# Patient Record
Sex: FEMALE | Race: WHITE | Employment: UNEMPLOYED | URBAN - METROPOLITAN AREA
[De-identification: names, ages, dates, MRNs, and addresses within clinical notes are randomized per-mention and may not be internally consistent; named-entity substitution may affect disease eponyms.]

---

## 2019-12-11 ENCOUNTER — DIABETIC EDUCATION (OUTPATIENT)
Dept: ENDOCRINOLOGY CLINIC | Facility: CLINIC | Age: 39
End: 2019-12-11
Payer: COMMERCIAL

## 2019-12-11 VITALS — WEIGHT: 158.19 LBS | HEIGHT: 60 IN | BODY MASS INDEX: 31.06 KG/M2

## 2019-12-11 DIAGNOSIS — E11.65 TYPE 2 DIABETES MELLITUS WITH HYPERGLYCEMIA, WITHOUT LONG-TERM CURRENT USE OF INSULIN (HCC): Primary | ICD-10-CM

## 2019-12-11 PROCEDURE — G0108 DIAB MANAGE TRN  PER INDIV: HCPCS | Performed by: DIETITIAN, REGISTERED

## 2019-12-12 ENCOUNTER — TELEPHONE (OUTPATIENT)
Dept: NEUROLOGY | Facility: CLINIC | Age: 39
End: 2019-12-12

## 2019-12-12 NOTE — TELEPHONE ENCOUNTER
Had received medical records   [address] office notes and Lab report  Placed on the red folder for upcoming appointment on 12/12/19.

## 2019-12-12 NOTE — TELEPHONE ENCOUNTER
Explain patient that she needed to call them so they can fax over medical records as we don't know their  protocols. And explained that she need to bring her CT and MRI CD with reports.     I don't know what had happened she stopped me and telling me that I

## 2019-12-13 ENCOUNTER — OFFICE VISIT (OUTPATIENT)
Dept: NEUROLOGY | Facility: CLINIC | Age: 39
End: 2019-12-13
Payer: COMMERCIAL

## 2019-12-13 ENCOUNTER — TELEPHONE (OUTPATIENT)
Dept: NEUROLOGY | Facility: CLINIC | Age: 39
End: 2019-12-13

## 2019-12-13 VITALS
RESPIRATION RATE: 16 BRPM | BODY MASS INDEX: 31.02 KG/M2 | WEIGHT: 158 LBS | HEIGHT: 60 IN | DIASTOLIC BLOOD PRESSURE: 78 MMHG | HEART RATE: 82 BPM | SYSTOLIC BLOOD PRESSURE: 120 MMHG

## 2019-12-13 DIAGNOSIS — R39.15 URINARY URGENCY: ICD-10-CM

## 2019-12-13 DIAGNOSIS — R32 URINARY INCONTINENCE, UNSPECIFIED TYPE: ICD-10-CM

## 2019-12-13 DIAGNOSIS — R29.818 LHERMITTE'S SIGN POSITIVE: ICD-10-CM

## 2019-12-13 DIAGNOSIS — H53.8 BLURRY VISION: ICD-10-CM

## 2019-12-13 DIAGNOSIS — R20.0 RIGHT SIDED NUMBNESS: Primary | ICD-10-CM

## 2019-12-13 PROCEDURE — 1111F DSCHRG MED/CURRENT MED MERGE: CPT | Performed by: OTHER

## 2019-12-13 PROCEDURE — 99204 OFFICE O/P NEW MOD 45 MIN: CPT | Performed by: OTHER

## 2019-12-13 RX ORDER — METFORMIN HYDROCHLORIDE EXTENDED-RELEASE TABLETS 500 MG/1
500 TABLET, FILM COATED, EXTENDED RELEASE ORAL 2 TIMES DAILY WITH MEALS
COMMUNITY
End: 2020-01-03

## 2019-12-13 NOTE — TELEPHONE ENCOUNTER
Pt scheduled for MRIs on Monday, 12/16 for brain, cervical, and thoracic MRIs. Dr. Driss Chavira requesting PA. Pt is scheduled for direct admit after the outpt MRIs are completed.

## 2019-12-13 NOTE — TELEPHONE ENCOUNTER
RN spoke to Loco Lovett in [facility] and the pt is scheduled for a outpatient MRI and 10:00. The patient will then be admitted via Dr. Ana Christian. RN will contact the pt to inform her of the appt Monday morning.       RN spoke to the pt and informed h

## 2019-12-13 NOTE — H&P
Daniella New Patient / Consult Visit    Jennifer Woods is a 45year old female. Referring MD: No ref.  provider found    Patient presents with:  Hospital F/U: C/O of numbnessin hand and feet and abnormal MRI incontinence. On workup in ED, 11/7/19, Blood glucose was 295, A1C was 10.2, U/A with glucosuria.        Patient has a primary care provider, Dr. Leonarda Jones facility and was recently started on medication for diabetes        Otherw cyanosis, peripheral pulses intact    Neck: Supple; full range of motion; no carotid bruits    Mental status:  Alert and oriented to time, place, person, and situation  Speech: fluent  Language: normal naming, repetition, and comprehension  Memory: normal 31    TSH; 1.14 (normal)  Cpeptide: 3.12 (normal)  ESR 10 (normal)      Imaging:     Independently reviewed; poor quality but a few periventricular frontal white matter hypodensities noted ; no mass, no hemorrhage  CT brain: per report: (11/7/19)    Carmen Calderón addition to CSF studies, as noted below, to complete the work-up for demyelinating disease versus other mimickers.   Although patient states that she had an MRI of the brain done, no reports or imaging were available at the time of visit despite obtaining o PANEL VASCULITIS W/REFLEX,         RHEUMATOID ARTHRITIS FACTOR, VITAMIN B12, CELL         COUNT, CSF, CYTOLOGY FLUIDS, GLUCOSE,         CEREBROSPINAL FLUID, IGG/ALBUMIN RATIO,CSF,         PROTEIN, TOTAL, CSF, OLIGOCLONAL BANDING,         MONOCLONAL PROTEIN physician.       Matt Jackson MD, Neurology  [facility]  Pager [phone]  12/13/2019

## 2019-12-16 ENCOUNTER — HOSPITAL ENCOUNTER (OUTPATIENT)
Dept: MRI IMAGING | Facility: HOSPITAL | Age: 39
Discharge: HOME | End: 2019-12-16
Attending: Other
Payer: COMMERCIAL

## 2019-12-16 ENCOUNTER — HOSPITAL ENCOUNTER (OUTPATIENT)
Facility: HOSPITAL | Age: 39
Setting detail: OBSERVATION
Discharge: HOME | End: 2019-12-17
Attending: Other | Admitting: Other
Payer: COMMERCIAL

## 2019-12-16 ENCOUNTER — TELEPHONE (OUTPATIENT)
Dept: NEUROLOGY | Facility: CLINIC | Age: 39
End: 2019-12-16

## 2019-12-16 DIAGNOSIS — R32 URINARY INCONTINENCE, UNSPECIFIED TYPE: ICD-10-CM

## 2019-12-16 DIAGNOSIS — R29.818 LHERMITTE'S SIGN POSITIVE: ICD-10-CM

## 2019-12-16 DIAGNOSIS — R39.15 URINARY URGENCY: ICD-10-CM

## 2019-12-16 DIAGNOSIS — R20.0 RIGHT SIDED NUMBNESS: Primary | ICD-10-CM

## 2019-12-16 DIAGNOSIS — H53.8 BLURRY VISION: ICD-10-CM

## 2019-12-16 DIAGNOSIS — R20.0 RIGHT SIDED NUMBNESS: ICD-10-CM

## 2019-12-16 DIAGNOSIS — Z01.818 PRE-OP TESTING: ICD-10-CM

## 2019-12-16 LAB — GLUCOSE BLD-MCNC: 139 MG/DL (ref 70–99)

## 2019-12-16 PROCEDURE — 99222 1ST HOSP IP/OBS MODERATE 55: CPT | Performed by: OTHER

## 2019-12-16 PROCEDURE — A9575 INJ GADOTERATE MEGLUMI 0.1ML: HCPCS | Performed by: OTHER

## 2019-12-16 PROCEDURE — 70553 MRI BRAIN STEM W/O & W/DYE: CPT | Performed by: OTHER

## 2019-12-16 PROCEDURE — 72156 MRI NECK SPINE W/O & W/DYE: CPT | Performed by: OTHER

## 2019-12-16 PROCEDURE — 72157 MRI CHEST SPINE W/O & W/DYE: CPT | Performed by: OTHER

## 2019-12-16 PROCEDURE — 72158 MRI LUMBAR SPINE W/O & W/DYE: CPT | Performed by: OTHER

## 2019-12-16 RX ORDER — LORAZEPAM 1 MG/1
1 TABLET ORAL EVERY 12 HOURS PRN
Status: DISCONTINUED | OUTPATIENT
Start: 2019-12-16 | End: 2019-12-17

## 2019-12-16 NOTE — CONSULTS
[facility]    Report of Consultation    José Miguel Sow Patient Status:  Inpatient    1980 MRN [id number]   [facility] 3NE-A Attending Manisha Puentes MD   Hosp Day # 0 [facility] [address] [facility]     Date of Admission:  2019  Date Pertinent positives and negatives are noted in HPI. Physical Exam:  Blood pressure 105/74, last menstrual period 12/01/2019. GENERAL:  Patient is a(n) 44year old female in no acute distress.   HEENT:  Normocephalic, atraumatic  LUNGS: Clear to ausc in the thoracic spine as above without significant spinal canal or neural foraminal stenosis at any level. 5. Mild degenerative changes in the lumbar spine are most pronounced at the L5-S1 level.   There is no significant spinal canal or neural foramina

## 2019-12-16 NOTE — TELEPHONE ENCOUNTER
Charge nurse at [facility] notified of the direct admission via Dr Kimberly Grigsby. Patient will come to Registration after done with outpatient MRI. Paula notified of the above. verbalized the understanding.

## 2019-12-16 NOTE — TELEPHONE ENCOUNTER
Cherry Holguin called from MRI and requested to have a [place] MRI.   RN spoke to Dr. Mary Jane Saleh and received the order from the MD.    RN ordered the test.

## 2019-12-17 ENCOUNTER — APPOINTMENT (OUTPATIENT)
Dept: GENERAL RADIOLOGY | Facility: HOSPITAL | Age: 39
End: 2019-12-17
Attending: Other
Payer: COMMERCIAL

## 2019-12-17 VITALS
DIASTOLIC BLOOD PRESSURE: 71 MMHG | TEMPERATURE: 98 F | HEART RATE: 82 BPM | RESPIRATION RATE: 12 BRPM | OXYGEN SATURATION: 99 % | SYSTOLIC BLOOD PRESSURE: 111 MMHG

## 2019-12-17 LAB
ALBUMIN SERPL-MCNC: 3.9 G/DL (ref 3.4–5)
ALBUMIN/GLOB SERPL: 1.3 {RATIO} (ref 1–2)
ALP LIVER SERPL-CCNC: 80 U/L (ref 37–98)
ALT SERPL-CCNC: 32 U/L (ref 13–56)
ANION GAP SERPL CALC-SCNC: 8 MMOL/L (ref 0–18)
AST SERPL-CCNC: 21 U/L (ref 15–37)
BASOPHILS # BLD AUTO: 0.04 X10(3) UL (ref 0–0.2)
BASOPHILS NFR BLD AUTO: 0.7 %
BILIRUB SERPL-MCNC: 0.8 MG/DL (ref 0.1–2)
BUN BLD-MCNC: 9 MG/DL (ref 7–18)
BUN/CREAT SERPL: 24.3 (ref 10–20)
CALCIUM BLD-MCNC: 9 MG/DL (ref 8.5–10.1)
CHLORIDE SERPL-SCNC: 106 MMOL/L (ref 98–112)
CHOLEST SMN-MCNC: 207 MG/DL (ref ?–200)
CLARITY CSF: CLEAR
CLARITY CSF: CLEAR
CO2 SERPL-SCNC: 26 MMOL/L (ref 21–32)
COLOR CSF: COLORLESS
COLOR CSF: COLORLESS
COUNT PERFORMED ON TUBE: 1
COUNT PERFORMED ON TUBE: 4
CREAT BLD-MCNC: 0.37 MG/DL (ref 0.55–1.02)
DEPRECATED RDW RBC AUTO: 37.3 FL (ref 35.1–46.3)
EOSINOPHIL # BLD AUTO: 0.1 X10(3) UL (ref 0–0.7)
EOSINOPHIL NFR BLD AUTO: 1.7 %
ERYTHROCYTE [DISTWIDTH] IN BLOOD BY AUTOMATED COUNT: 12.3 % (ref 11–15)
EST. AVERAGE GLUCOSE BLD GHB EST-MCNC: 169 MG/DL (ref 68–126)
GLOBULIN PLAS-MCNC: 3.1 G/DL (ref 2.8–4.4)
GLUCOSE BLD-MCNC: 108 MG/DL (ref 70–99)
GLUCOSE BLD-MCNC: 118 MG/DL (ref 70–99)
GLUCOSE CSF-MCNC: 67 MG/DL (ref 40–70)
HBA1C MFR BLD HPLC: 7.5 % (ref ?–5.7)
HCT VFR BLD AUTO: 43.3 % (ref 35–48)
HDLC SERPL-MCNC: 41 MG/DL (ref 40–59)
HGB BLD-MCNC: 15.1 G/DL (ref 12–16)
IMM GRANULOCYTES # BLD AUTO: 0.01 X10(3) UL (ref 0–1)
IMM GRANULOCYTES NFR BLD: 0.2 %
INR BLD: 1 (ref 0.9–1.1)
LDLC SERPL CALC-MCNC: 133 MG/DL (ref ?–100)
LYMPHOCYTES # BLD AUTO: 1.54 X10(3) UL (ref 1–4)
LYMPHOCYTES NFR BLD AUTO: 25.7 %
M PROTEIN MFR SERPL ELPH: 7 G/DL (ref 6.4–8.2)
MCH RBC QN AUTO: 29.8 PG (ref 26–34)
MCHC RBC AUTO-ENTMCNC: 34.9 G/DL (ref 31–37)
MCV RBC AUTO: 85.4 FL (ref 80–100)
MONOCYTES # BLD AUTO: 0.36 X10(3) UL (ref 0.1–1)
MONOCYTES NFR BLD AUTO: 6 %
NEUTROPHILS # BLD AUTO: 3.94 X10 (3) UL (ref 1.5–7.7)
NEUTROPHILS # BLD AUTO: 3.94 X10(3) UL (ref 1.5–7.7)
NEUTROPHILS NFR BLD AUTO: 65.7 %
NONHDLC SERPL-MCNC: 166 MG/DL (ref ?–130)
OSMOLALITY SERPL CALC.SUM OF ELEC: 289 MOSM/KG (ref 275–295)
PLATELET # BLD AUTO: 214 10(3)UL (ref 150–450)
POTASSIUM SERPL-SCNC: 4.2 MMOL/L (ref 3.5–5.1)
PROT PATTERN CSF ELPH-IMP: 35.9 MG/DL (ref 15–45)
PSA SERPL DL<=0.01 NG/ML-MCNC: 13.6 SECONDS (ref 12.5–14.7)
RBC # BLD AUTO: 5.07 X10(6)UL (ref 3.8–5.3)
RBC # CSF: 0 /MM3
RBC # CSF: 1 /MM3
SODIUM SERPL-SCNC: 140 MMOL/L (ref 136–145)
T4 FREE SERPL-MCNC: 0.9 NG/DL (ref 0.8–1.7)
TOTAL VOLUME CSF: 12 ML
TOTAL VOLUME CSF: 12 ML
TRIGL SERPL-MCNC: 165 MG/DL (ref 30–149)
TSI SER-ACNC: 3.48 MIU/ML (ref 0.36–3.74)
VLDLC SERPL CALC-MCNC: 33 MG/DL (ref 0–30)
WBC # BLD AUTO: 6 X10(3) UL (ref 4–11)
WBC # CSF: 1 /MM3 (ref 0–5)
WBC # CSF: 2 /MM3 (ref 0–5)

## 2019-12-17 PROCEDURE — 77003 FLUOROGUIDE FOR SPINE INJECT: CPT | Performed by: OTHER

## 2019-12-17 PROCEDURE — 99222 1ST HOSP IP/OBS MODERATE 55: CPT | Performed by: HOSPITALIST

## 2019-12-17 PROCEDURE — 99233 SBSQ HOSP IP/OBS HIGH 50: CPT | Performed by: OTHER

## 2019-12-17 PROCEDURE — 009U3ZX DRAINAGE OF SPINAL CANAL, PERCUTANEOUS APPROACH, DIAGNOSTIC: ICD-10-PCS | Performed by: RADIOLOGY

## 2019-12-17 PROCEDURE — 62270 DX LMBR SPI PNXR: CPT | Performed by: OTHER

## 2019-12-17 RX ORDER — NORTRIPTYLINE HYDROCHLORIDE 10 MG/1
10 CAPSULE ORAL NIGHTLY
Status: DISCONTINUED | OUTPATIENT
Start: 2019-12-17 | End: 2019-12-17

## 2019-12-17 RX ORDER — NORTRIPTYLINE HYDROCHLORIDE 10 MG/1
20 CAPSULE ORAL NIGHTLY
Status: DISCONTINUED | OUTPATIENT
Start: 2019-12-20 | End: 2019-12-17

## 2019-12-17 RX ORDER — ACETAMINOPHEN 325 MG/1
TABLET ORAL
Status: DISCONTINUED
Start: 2019-12-17 | End: 2019-12-17

## 2019-12-17 RX ORDER — ACETAMINOPHEN 325 MG/1
650 TABLET ORAL EVERY 6 HOURS PRN
Status: DISCONTINUED | OUTPATIENT
Start: 2019-12-17 | End: 2019-12-17

## 2019-12-17 RX ORDER — METHYLPREDNISOLONE 4 MG/1
TABLET ORAL
Qty: 21 TABLET | Refills: 0 | Status: SHIPPED | OUTPATIENT
Start: 2019-12-17 | End: 2020-01-03 | Stop reason: ALTCHOICE

## 2019-12-17 RX ORDER — NORTRIPTYLINE HYDROCHLORIDE 10 MG/1
CAPSULE ORAL
Qty: 63 CAPSULE | Refills: 1 | Status: SHIPPED | OUTPATIENT
Start: 2019-12-17 | End: 2020-01-03

## 2019-12-17 NOTE — PLAN OF CARE
A&Ox4. Patient admits to right arm and right leg weakness, numbness and tingling. States that her right eye feels like it \"has a film over it\". C/o headache- declined pharmacological intervention, ice packs given.  Remains on room air, lungs clear and dim volume within ordered parameters to optimize cerebral perfusion and minimize risk of hemorrhage  - Monitor temperature, glucose, and sodium.  Initiate appropriate interventions as ordered  12/17/2019 0008 by Prisca Dominguez RN  Outcome: Progressing  12/17/2019

## 2019-12-17 NOTE — PLAN OF CARE
Patient to be discharged home this afternoon.      Problem: Diabetes/Glucose Control  Goal: Glucose maintained within prescribed range  Description  INTERVENTIONS:  - Monitor Blood Glucose as ordered  - Assess for signs and symptoms of hyperglycemia and hyp

## 2019-12-17 NOTE — PROGRESS NOTES
PROCEDURE: XR LUMBAR PUNCTURE  (CPT=62270,66947)    COMPARISON: [facility] , MR, MRI CERVICAL+THORACIC+LUMBAR SPINE (ALL W+WO) (CPT=72156/44092/7, 12/16/2019, 9:33.     INDICATIONS: evaluate for demyelinating disease    TECHNIQUE: The risks, benefits, and alter

## 2019-12-17 NOTE — PLAN OF CARE
Patient to be discharged home this afternoon.     Problem: Diabetes/Glucose Control  Goal: Glucose maintained within prescribed range  Description  INTERVENTIONS:  - Monitor Blood Glucose as ordered  - Assess for signs and symptoms of hyperglycemia and hypo Mandy Castañeda RN  Outcome: Adequate for Discharge

## 2019-12-17 NOTE — PROGRESS NOTES
[address] [facility] Neu[facility] Progress Note    Zuri Renee Patient Status:  Inpatient    1980 MRN [id number]   [facility] 3NE-A Attending Jarett Ricardo MD   Hosp Day # 1 [facility]         Subjective:  Zuri Renee is abnormality identified.      2. Small arachnoid cyst along the paramedian left cerebellar hemisphere measuring 1.9 x 1.9 cm.     3. Scattered punctate and patchy areas of nonspecific T2/FLAIR hyperintensity noted in the cerebral white matter.  Possible etio Addendum:  I have seen the patient independently, reviewed the history, labs and imaging, and agree with above note with following additions:  S:Reports had headache last night but did not take the Depakote  O: BP 99/63 (BP Location: Left arm)   Pulse 87 start low dose statin?     LP today- routine studies and oligoclonal bands    Migraine headaches, daily, with medication overuse component- recommend initiation of nortriptyline 20mg nightly, discussed cessation of ibuprofen and replace temporarily with Me

## 2019-12-17 NOTE — CONSULTS
[facility],  [address] Patient Status:  Inpatient    1980 MRN [id number]   [facility] [place] 3NE-A Attending Leeanna Jung MD   Hosp Day # 1 PCP [address]     Reason for consult: Diabetes mellitus    Requeste comprehensive 14 point review of systems was completed. Pertinent positives and negatives noted in the HPI.     Physical Exam:    /71 (BP Location: Left arm)   Pulse 82   Temp 97.5 °F (36.4 °C) (Oral)   Resp 12   LMP 12/01/2019   SpO2 99%   General

## 2019-12-17 NOTE — TELEPHONE ENCOUNTER
Anika 9  [facility]  Medical Records reviewed by Dr. Pamela Zepeda.   Copy to scanning

## 2019-12-17 NOTE — TELEPHONE ENCOUNTER
PA department was not notified of added test on 12/16/19 to start PA on 12/16/19. MRI without contrast [id number] was in [place] campus on 12/17/19. I closed referral because with and without was done on 12/16/19.      I called [facility] today and spoke with [facility] to see i

## 2019-12-18 LAB
ALBUMIN, CSF: 16 MG/DL
CSF IGG/ALBUMIN RATIO: 0.08 RATIO
IMMUNOGLOBULIN G CSF: 1.3 MG/DL

## 2019-12-18 NOTE — PAYOR COMM NOTE
--------------  ADMISSION REVIEW     Payor: Harriet Hays [facility] #:  [id number]  Authorization Number: [id number]    Admit date: 12/16/19  Admit time: 5       Admitting Physician: Nichole Vargas MD  Attending Physician:  Roxie drugs.     Allergies:     Penicillins             HIVES     Medications:     Current Facility-Administered Medications:   •  Valproate Sodium (DEPACON) 500 mg in sodium chloride 0.9% 100 mL IVPB, 500 mg, Intravenous, Q12H PRN  •  LORazepam (ATIVAN) tab 1 m 12/16/19  CONCLUSION:       1. Mild degenerative changes in the cervical spine as above along with developmental narrowing of the cervical spinal canal contributes to spinal canal stenosis at the C4-5 level.       2.  There is mild spinal canal stenosis at Ritu Alonzo is a(n) 44year old female with history of newly diagnosed DM, who was admitted for work up for demyelinating disease, for multiple symptoms including blurry vision in the right eye, loss of mental clarity, tingling in her extremities, feeling warm cerebral white matter.  Possible etiologies for these findings would include migraine headaches, postinfectious/postinflammatory changes, nonspecific gliosis, and   accelerated microvascular ischemic disease amongst various other etiologies.  Clinical corre Location: Left arm)   Pulse 87   Temp 98.1 °F (36.7 °C) (Oral)   Resp 16   LMP 12/01/2019   Neuro exam pertinent for unchanged from yesterday   Imaging:   MRI cervical/thoracic/lumbar  CONCLUSION:       1.  Mild degenerative changes in the cervical spine as ibuprofen and replace temporarily with Medrol dose pack for headache control, followed by prn ibuprofen, but NOT more than 2-3 times per week.        Shirlene Santoro DO  Neuromuscular and General Neurology      [facility] HOSPITALIST  CONSULT            Paula POLO encounter. metFORMIN HCl ER, OSM, 500 MG (OSM) Oral Tablet 24 Hr, Take 500 mg by mouth 2 (two) times daily with meals. , Disp: , Rfl:   SITagliptin Phosphate 25 MG Oral Tab, Take 25 mg by mouth daily. , Disp: , Rfl:            Review of Systems:   A compre

## 2019-12-19 LAB
ALBUMIN INDEX: 4.8 RATIO
ALBUMIN, CSF: 21 MG/DL
ALBUMIN, SERUM/PLASMA, NEPH: 4390 MG/DL
CSF BAND OLIGOCLONAL: NEGATIVE
CSF IGG SYNTHESIS RATE: <0 MG/D
CSF IGG/ALBUMIN RATIO: 0.08 RATIO
CSF OLIGOCLONAL BANDS NUMBER: 0 BANDS
IGG INDEX: 0.46 RATIO
IMMUNOGLOBULIN G CSF: 1.6 MG/DL
IMMUNOGLOBULIN G: 724 MG/DL
NON GYNE INTERPRETATION: NEGATIVE
T.PALLIDUM (VDRL) CSF REFLEX: NON REACTIVE

## 2019-12-19 NOTE — PAYOR COMM NOTE
--------------  DISCHARGE REVIEW    Payor: Harriet Hays [address] #:  [id number]  Authorization Number: [id number]    Admit date: 12/16/19  Admit time:  2210  Discharge Date: 12/17/2019  5:07 PM     Admitting Physician: Abraham Noyola

## 2019-12-20 LAB
HSV 1 SUBTYPE BY PCR: NOT DETECTED
HSV 2 SUBTYPE BY PCR: NOT DETECTED

## 2019-12-23 PROBLEM — Z01.818 PRE-OP TESTING: Status: ACTIVE | Noted: 2019-12-23

## 2020-01-02 RX ORDER — METFORMIN HYDROCHLORIDE 500 MG/1
TABLET, EXTENDED RELEASE ORAL
Refills: 1 | COMMUNITY
Start: 2019-12-12

## 2020-01-03 ENCOUNTER — OFFICE VISIT (OUTPATIENT)
Dept: NEUROLOGY | Facility: CLINIC | Age: 40
End: 2020-01-03
Payer: COMMERCIAL

## 2020-01-03 VITALS
HEIGHT: 60 IN | RESPIRATION RATE: 14 BRPM | SYSTOLIC BLOOD PRESSURE: 124 MMHG | BODY MASS INDEX: 29.64 KG/M2 | WEIGHT: 151 LBS | HEART RATE: 110 BPM | DIASTOLIC BLOOD PRESSURE: 84 MMHG | OXYGEN SATURATION: 98 %

## 2020-01-03 DIAGNOSIS — R20.2 PARESTHESIAS: ICD-10-CM

## 2020-01-03 DIAGNOSIS — R32 URINARY INCONTINENCE, UNSPECIFIED TYPE: ICD-10-CM

## 2020-01-03 DIAGNOSIS — R20.0 RIGHT SIDED NUMBNESS: Primary | ICD-10-CM

## 2020-01-03 PROCEDURE — 99214 OFFICE O/P EST MOD 30 MIN: CPT | Performed by: OTHER

## 2020-01-03 RX ORDER — NORTRIPTYLINE HYDROCHLORIDE 25 MG/1
25 CAPSULE ORAL NIGHTLY
Qty: 30 CAPSULE | Refills: 2 | Status: SHIPPED | OUTPATIENT
Start: 2020-01-03 | End: 2020-04-02

## 2020-01-03 NOTE — PROGRESS NOTES
[facility] Progress Note    HPI  Patient presents with:  Numbness: right side 3 week f/u-still having numbness, feels worse      As per my initial H&P from 12/13/19:,   \" Sadipopeye Calix is a 45year old, who presents for evaluation of 10.2, U/A with glucosuria.        Patient has a primary care provider, Dr. Toby Rodrigez [facility] and was recently started on medication for diabetes        Otherwise, patient denies any recent weight change, fevers, chills, nausea,  chest SITagliptin Phosphate 25 MG Oral Tab, Take 25 mg by mouth daily. , Disp: , Rfl:     Review of Systems:  No chest pain or palpitations; otherwise as noted in HPI.     Exam:  /84 (BP Location: Left arm, Patient Position: Sitting, Cuff Size: adult)   Puls (3) uL    Neutrophils Absolute      1.50 - 7.70 x10(3) uL    Lymphocytes Absolute      1.00 - 4.00 x10(3) uL    Monocytes Absolute      0.10 - 1.00 x10(3) uL    Eosinophils Absolute      0.00 - 0.70 x10(3) uL    Basophils Absolute      0.00 - 0.20 x10(3) u DIAGNOSIS          Gross Description          CLINICAL INFORMATION          Non Gyne Interpretation           CSF BAND OLIGOCLONAL          CSF OLIGOCLONAL BANDS NUMBER      0 - 1 Bands    CSF OLIG INTERPRETATION          PT      12.5 - 14.7 seconds    INR AST (SGOT)      15 - 37 U/L    ALT (SGPT)      13 - 56 U/L    ALKALINE PHOSPHATASE      37 - 98 U/L    Total Bilirubin      0.1 - 2.0 mg/dL    TOTAL PROTEIN      6.4 - 8.2 g/dL    Albumin      3.4 - 5.0 g/dL    Globulin      2.8 - 4.4 g/dL    A/G Ratio 12/17/2019 12/17/2019 12/17/2019          10:47 AM 10:45 AM 10:37 AM   WBC      4.0 - 11.0 x10(3) uL 6.0     RBC      3.80 - 5.30 x10(6)uL 5.07     Hemoglobin      12.0 - 16.0 g/dL 15.1     Hematocrit      35.0 - 48.0 % 43.3     Platelet Count      180.5 - 1. 6 1.3   ALBUMIN, SERUM/PLASMA, NEPH      3500 - 5200 mg/dL  4390    ALBUMIN INDEX      0.0 - 9.0 ratio  4.8    CSF IGG SYNTHESIS RATE      <=8.0 mg/d  <0.0    CSF IGG/ALBUMIN RATIO      0.09 - 0.25 ratio  0.08 (L) 0.08 (L)   IGG INDEX      0.28 - 0.66 ra Absolute      0.10 - 1.00 x10(3) uL     Eosinophils Absolute      0.00 - 0.70 x10(3) uL     Basophils Absolute      0.00 - 0.20 x10(3) uL     Immature Granulocyte Absolute      0.00 - 1.00 x10(3) uL     Neutrophils %      %     Lymphocytes %      %     Mon Report           FINAL DIAGNOSIS           Gross Description           CLINICAL INFORMATION           Non Gyne Interpretation            CSF BAND OLIGOCLONAL           CSF OLIGOCLONAL BANDS NUMBER      0 - 1 Bands     CSF OLIG INTERPRETATION           PT parenchymal gradient susceptibility. There is no abnormal parenchymal or leptomeningeal enhancement. There is no restricted diffusion to suggest acute ischemia/infarction.      Small right maxillary retention cyst.  Trace ethmoid and maxillary mucosal thi foraminal stenosis. C3-4:  Minimal central disc protrusion. There is no significant spinal canal or neural foraminal stenosis.      C4-5: There is a minimal posterior disk osteophyte complex with minimal uncovertebral and facet joint degenerative change level.  The roots of the cauda equina are unremarkable. No focal mass or fluid collection is seen in the lumbar spinal   canal.  The paraspinal soft tissues are unremarkable. T12-L1 and L1-2: There is no significant abnormality.      L2-3 through L4-5: history of constipation, with new onset of confusion, vision changes, and right-sided numbness suggestive of demyelinating disease, but MRI of the brain demonstrates nonspecific T2/flair periventricular white matter changes, with no evidence for elevation

## 2020-04-02 DIAGNOSIS — R20.2 PARESTHESIAS: ICD-10-CM

## 2020-04-02 DIAGNOSIS — R20.0 RIGHT SIDED NUMBNESS: ICD-10-CM

## 2020-04-02 RX ORDER — NORTRIPTYLINE HYDROCHLORIDE 25 MG/1
25 CAPSULE ORAL NIGHTLY
Qty: 30 CAPSULE | Refills: 2 | Status: SHIPPED | OUTPATIENT
Start: 2020-04-02 | End: 2020-06-24

## 2020-04-02 NOTE — TELEPHONE ENCOUNTER
Medication: Nortripyline    Date of last refill: 1/3/2020 (#30/2)  [place]    Last office visit: 1/3/2020  Due back to clinic per last office note:  Not specified in note  Date next office visit sc

## 2020-06-24 DIAGNOSIS — R20.2 PARESTHESIAS: ICD-10-CM

## 2020-06-24 DIAGNOSIS — R20.0 RIGHT SIDED NUMBNESS: ICD-10-CM

## 2020-06-24 RX ORDER — NORTRIPTYLINE HYDROCHLORIDE 25 MG/1
CAPSULE ORAL
Qty: 90 CAPSULE | Refills: 0 | Status: SHIPPED | OUTPATIENT
Start: 2020-06-24

## 2020-06-24 NOTE — TELEPHONE ENCOUNTER
Medication: Nortriptyline HCl 25 MG Oral Cap    Date of last refill: 4/2/2020 (#30/2)  [place] 0/89/0814    Last office visit: 1/3/2020  Due back to clinic per last office note:  N/A  Date next office visit scheduled:  [address])        As noted above      (R20.2) Paresthesias  Plan: Nortriptyline HCl 25 MG Oral Cap, EMG ([address] [place] [address])        As noted above      No follow-ups on file.

## 2023-08-22 ENCOUNTER — OFFICE VISIT (OUTPATIENT)
Facility: LOCATION | Age: 43
End: 2023-08-22
Payer: COMMERCIAL

## 2023-08-22 DIAGNOSIS — E04.2 MULTIPLE THYROID NODULES: Primary | ICD-10-CM

## 2023-08-22 DIAGNOSIS — R49.0 HOARSENESS: ICD-10-CM

## 2023-08-22 DIAGNOSIS — R13.13 PHARYNGEAL DYSPHAGIA: ICD-10-CM

## 2023-08-22 PROCEDURE — 31575 DIAGNOSTIC LARYNGOSCOPY: CPT | Performed by: OTOLARYNGOLOGY

## 2023-08-22 PROCEDURE — 99204 OFFICE O/P NEW MOD 45 MIN: CPT | Performed by: OTOLARYNGOLOGY

## 2023-08-22 RX ORDER — DIAZEPAM 5 MG/1
5 TABLET ORAL EVERY 6 HOURS PRN
Qty: 2 TABLET | Refills: 0 | Status: SHIPPED
Start: 2023-08-22 | End: 2023-08-22

## 2023-08-22 RX ORDER — DIAZEPAM 5 MG/1
5 TABLET ORAL EVERY 6 HOURS PRN
Qty: 2 TABLET | Refills: 0 | Status: SHIPPED | OUTPATIENT
Start: 2023-08-22

## 2023-08-22 NOTE — PROGRESS NOTES
Myranda Patel is a 43year old female. Patient presents with:  Thyroid Problem    HPI:   She has history of thyroid nodules. She has noticed some fullness in her throat. At times it is difficult to swallow. She will also get some hoarseness. Current Outpatient Medications   Medication Sig Dispense Refill    diazePAM (VALIUM) 5 MG Oral Tab Take 1 tablet (5 mg total) by mouth every 6 (six) hours as needed for Anxiety. Take one tab one hour before procedure. May repeat one time. 2 tablet 0    NORTRIPTYLINE HCL 25 MG Oral Cap TAKE 1 CAPSULE BY MOUTH EVERY DAY IN THE EVENING 90 capsule 0    PROCTOZONE-HC 2.5 % Rectal Cream APPLY CREAM RECTALLY TO AFFECTED AREA TWICE DAILY FOR 10 DAYS      metFORMIN HCl  MG Oral Tablet 24 Hr TAKE 1 TABLET BY MOUTH TWICE A DAY WITH FOOD  1    SITagliptin Phosphate 25 MG Oral Tab Take 25 mg by mouth daily. Past Medical History:   Diagnosis Date    Diabetes ([facility] [address]       Social History:  Social History     Socioeconomic History    Marital status:    Tobacco Use    Smoking status: Never    Smokeless tobacco: Never   Vaping Use    Vaping Use: Never used   Substance and Sexual Activity    Alcohol use: Not Currently    Drug use: Never   Other Topics Concern    Caffeine Concern Yes     Comment: 1-2 cups a day    Special Diet Yes     Comment: 2-3 x week      Past Surgical History:   Procedure Laterality Date      2002      06/15/2005      2007      10/17/2013         REVIEW OF SYSTEMS:   GENERAL HEALTH: feels well otherwise  GENERAL : denies fever, chills, sweats, weight loss, weight gain  SKIN: denies any unusual skin lesions or rashes  RESPIRATORY: denies shortness of breath with exertion  NEURO: denies headaches    EXAM:   There were no vitals taken for this visit. System Findings Details   Constitutional  Overall appearance - Normal.   Psychiatric  Orientation - Oriented to time, place, person & situation.  Appropriate mood and affect. Head/Face  Facial features -- Normal. Skull - Normal.   Eyes  Pupils equal ,round ,react to light and accomidate   Ears, Nose, Throat, Neck  Ears clear nose clear pharynx clear neck no masses   Neurological  Memory - Normal. Cranial nerves - Cranial nerves II through XII grossly intact. Lymph Detail  Submental. Submandibular. Anterior cervical. Posterior cervical. Supraclavicular. Flexible Laryngoscopy Procedure Note (82538)    Due to inability for adequate examination of the larynx and need for magnification to perform the examination, endoscopy was performed. Risks and benefits were discussed with patient/family and they have given verbal consent to proceed. Pre-operative Diagnosis: Multiple thyroid nodules  (primary encounter diagnosis)  Pharyngeal dysphagia  Hoarseness    Post-operative Diagnosis: Same    Procedure: Diagnostic flexible fiberoptic laryngoscopy    Anesthesia: topical lidocaine    Surgeon: Marcie Avila MD    EBL: 0cc    Procedure Detail & Findings: Base tongue epiglottis and true vocal cords are normal with normal mobility    Condition: Stable    Complications: Patient tolerated the procedure well with no immediate complications. Chemo Rodríguez MD    ASSESSMENT AND PLAN:   1. Multiple thyroid nodules  Ultrasound reviewed. This shows multiple nodules. The ultrasound is from [address] [place] [address]. She shows a 1.6 cm nodule which needs biopsy. She also has a family history of thyroid cancer in sister.  - US FNA THYROID, GUIDE INCLD, FIRST LESION (CPT=10005); Future    2. Pharyngeal dysphagia  Examination today including flexible laryngoscopy is otherwise normal.    3. Hoarseness        The patient indicates understanding of these issues and agrees to the plan. No follow-ups on file.     Marcie Avila MD  8/22/2023  6:55 PM

## 2023-09-11 ENCOUNTER — HOSPITAL ENCOUNTER (OUTPATIENT)
Dept: ULTRASOUND IMAGING | Facility: HOSPITAL | Age: 43
Discharge: HOME | End: 2023-09-11
Attending: OTOLARYNGOLOGY
Payer: COMMERCIAL

## 2023-09-11 DIAGNOSIS — E04.2 MULTIPLE THYROID NODULES: ICD-10-CM

## 2023-09-11 PROCEDURE — 88173 CYTOPATH EVAL FNA REPORT: CPT | Performed by: OTOLARYNGOLOGY

## 2023-09-11 PROCEDURE — 10005 FNA BX W/US GDN 1ST LES: CPT | Performed by: OTOLARYNGOLOGY

## 2023-09-13 ENCOUNTER — TELEPHONE (OUTPATIENT)
Facility: LOCATION | Age: 43
End: 2023-09-13

## 2023-09-15 ENCOUNTER — OFFICE VISIT (OUTPATIENT)
Facility: LOCATION | Age: 43
End: 2023-09-15
Payer: COMMERCIAL

## 2023-09-15 DIAGNOSIS — C73 PAPILLARY THYROID CARCINOMA (HCC): Primary | ICD-10-CM

## 2023-09-20 ENCOUNTER — TELEPHONE (OUTPATIENT)
Facility: LOCATION | Age: 43
End: 2023-09-20

## 2023-09-20 DIAGNOSIS — C73 PAPILLARY CARCINOMA OF THYROID (HCC): Primary | ICD-10-CM

## 2023-09-20 DIAGNOSIS — E04.2 NONTOXIC MULTINODULAR GOITER: Primary | ICD-10-CM

## 2023-10-03 RX ORDER — ORAL SEMAGLUTIDE 3 MG/1
1 TABLET ORAL DAILY
Status: ON HOLD | COMMUNITY
End: 2023-10-29

## 2023-10-03 RX ORDER — LOSARTAN POTASSIUM 25 MG/1
25 TABLET ORAL NIGHTLY
COMMUNITY

## 2023-10-03 RX ORDER — EZETIMIBE 10 MG/1
10 TABLET ORAL NIGHTLY
COMMUNITY

## 2023-10-05 ENCOUNTER — EKG ENCOUNTER (OUTPATIENT)
Dept: LAB | Facility: HOSPITAL | Age: 43
End: 2023-10-05
Attending: OTOLARYNGOLOGY
Payer: COMMERCIAL

## 2023-10-05 DIAGNOSIS — E04.2 NONTOXIC MULTINODULAR GOITER: ICD-10-CM

## 2023-10-05 LAB
ALBUMIN SERPL-MCNC: 3.6 G/DL (ref 3.4–5)
ALBUMIN/GLOB SERPL: 1.1 {RATIO} (ref 1–2)
ALP LIVER SERPL-CCNC: 82 U/L
ALT SERPL-CCNC: 155 U/L
ANION GAP SERPL CALC-SCNC: 5 MMOL/L (ref 0–18)
AST SERPL-CCNC: 82 U/L (ref 15–37)
ATRIAL RATE: 77 BPM
BILIRUB SERPL-MCNC: 0.7 MG/DL (ref 0.1–2)
BUN BLD-MCNC: 7 MG/DL (ref 7–18)
CALCIUM BLD-MCNC: 8.7 MG/DL (ref 8.5–10.1)
CHLORIDE SERPL-SCNC: 105 MMOL/L (ref 98–112)
CO2 SERPL-SCNC: 27 MMOL/L (ref 21–32)
CREAT BLD-MCNC: 0.45 MG/DL
EGFRCR SERPLBLD CKD-EPI 2021: 123 ML/MIN/1.73M2 (ref 60–?)
FASTING STATUS PATIENT QL REPORTED: NO
GLOBULIN PLAS-MCNC: 3.4 G/DL (ref 2.8–4.4)
GLUCOSE BLD-MCNC: 175 MG/DL (ref 70–99)
OSMOLALITY SERPL CALC.SUM OF ELEC: 286 MOSM/KG (ref 275–295)
P AXIS: 24 DEGREES
P-R INTERVAL: 146 MS
POTASSIUM SERPL-SCNC: 3.5 MMOL/L (ref 3.5–5.1)
PROT SERPL-MCNC: 7 G/DL (ref 6.4–8.2)
Q-T INTERVAL: 400 MS
QRS DURATION: 66 MS
QTC CALCULATION (BEZET): 452 MS
R AXIS: 35 DEGREES
SODIUM SERPL-SCNC: 137 MMOL/L (ref 136–145)
T AXIS: 34 DEGREES
VENTRICULAR RATE: 77 BPM

## 2023-10-05 PROCEDURE — 93005 ELECTROCARDIOGRAM TRACING: CPT

## 2023-10-05 PROCEDURE — 93010 ELECTROCARDIOGRAM REPORT: CPT | Performed by: INTERNAL MEDICINE

## 2023-10-05 PROCEDURE — 36415 COLL VENOUS BLD VENIPUNCTURE: CPT | Performed by: OTOLARYNGOLOGY

## 2023-10-05 PROCEDURE — 80053 COMPREHEN METABOLIC PANEL: CPT | Performed by: OTOLARYNGOLOGY

## 2023-10-13 ENCOUNTER — ANESTHESIA EVENT (OUTPATIENT)
Dept: SURGERY | Facility: HOSPITAL | Age: 43
End: 2023-10-13
Payer: COMMERCIAL

## 2023-10-26 ENCOUNTER — HOSPITAL ENCOUNTER (OUTPATIENT)
Facility: HOSPITAL | Age: 43
Discharge: HOME | End: 2023-10-27
Attending: OTOLARYNGOLOGY | Admitting: OTOLARYNGOLOGY
Payer: COMMERCIAL

## 2023-10-26 ENCOUNTER — ANESTHESIA (OUTPATIENT)
Dept: SURGERY | Facility: HOSPITAL | Age: 43
End: 2023-10-26
Payer: COMMERCIAL

## 2023-10-26 DIAGNOSIS — E04.2 NONTOXIC MULTINODULAR GOITER: ICD-10-CM

## 2023-10-26 LAB
B-HCG UR QL: NEGATIVE
CALCIUM BLD-MCNC: 8.9 MG/DL (ref 8.5–10.1)
GLUCOSE BLD-MCNC: 224 MG/DL (ref 70–99)
GLUCOSE BLD-MCNC: 230 MG/DL (ref 70–99)
GLUCOSE BLD-MCNC: 233 MG/DL (ref 70–99)
GLUCOSE BLD-MCNC: 258 MG/DL (ref 70–99)
GLUCOSE BLD-MCNC: 263 MG/DL (ref 70–99)

## 2023-10-26 PROCEDURE — 07B10ZX EXCISION OF RIGHT NECK LYMPHATIC, OPEN APPROACH, DIAGNOSTIC: ICD-10-PCS | Performed by: OTOLARYNGOLOGY

## 2023-10-26 PROCEDURE — 99204 OFFICE O/P NEW MOD 45 MIN: CPT | Performed by: HOSPITALIST

## 2023-10-26 PROCEDURE — 0GTH0ZZ RESECTION OF RIGHT THYROID GLAND LOBE, OPEN APPROACH: ICD-10-PCS | Performed by: OTOLARYNGOLOGY

## 2023-10-26 PROCEDURE — 60240 REMOVAL OF THYROID: CPT | Performed by: OTOLARYNGOLOGY

## 2023-10-26 RX ORDER — HYDROMORPHONE HYDROCHLORIDE 1 MG/ML
0.4 INJECTION, SOLUTION INTRAMUSCULAR; INTRAVENOUS; SUBCUTANEOUS EVERY 2 HOUR PRN
Status: DISCONTINUED | OUTPATIENT
Start: 2023-10-26 | End: 2023-10-27

## 2023-10-26 RX ORDER — DIPHENHYDRAMINE HYDROCHLORIDE 50 MG/ML
12.5 INJECTION INTRAMUSCULAR; INTRAVENOUS AS NEEDED
Status: DISCONTINUED | OUTPATIENT
Start: 2023-10-26 | End: 2023-10-26 | Stop reason: HOSPADM

## 2023-10-26 RX ORDER — ONDANSETRON 2 MG/ML
4 INJECTION INTRAMUSCULAR; INTRAVENOUS EVERY 6 HOURS PRN
Status: DISCONTINUED | OUTPATIENT
Start: 2023-10-26 | End: 2023-10-26 | Stop reason: SDUPTHER

## 2023-10-26 RX ORDER — OXYCODONE HYDROCHLORIDE 5 MG/1
5 TABLET ORAL EVERY 4 HOURS PRN
Status: DISCONTINUED | OUTPATIENT
Start: 2023-10-26 | End: 2023-10-27

## 2023-10-26 RX ORDER — MEPERIDINE HYDROCHLORIDE 25 MG/ML
25 INJECTION INTRAMUSCULAR; INTRAVENOUS; SUBCUTANEOUS AS NEEDED
Status: DISCONTINUED | OUTPATIENT
Start: 2023-10-26 | End: 2023-10-26 | Stop reason: HOSPADM

## 2023-10-26 RX ORDER — DEXTROSE MONOHYDRATE 25 G/50ML
50 INJECTION, SOLUTION INTRAVENOUS
Status: DISCONTINUED | OUTPATIENT
Start: 2023-10-26 | End: 2023-10-27

## 2023-10-26 RX ORDER — LIDOCAINE HYDROCHLORIDE AND EPINEPHRINE 10; 10 MG/ML; UG/ML
INJECTION, SOLUTION INFILTRATION; PERINEURAL AS NEEDED
Status: DISCONTINUED | OUTPATIENT
Start: 2023-10-26 | End: 2023-10-26

## 2023-10-26 RX ORDER — CALCITRIOL 0.25 UG/1
0.25 CAPSULE, LIQUID FILLED ORAL ONCE
Status: COMPLETED | OUTPATIENT
Start: 2023-10-26 | End: 2023-10-26

## 2023-10-26 RX ORDER — OXYCODONE HYDROCHLORIDE 5 MG/1
5 TABLET ORAL EVERY 4 HOURS PRN
Qty: 20 TABLET | Refills: 0 | Status: SHIPPED | OUTPATIENT
Start: 2023-10-26

## 2023-10-26 RX ORDER — POLYETHYLENE GLYCOL 3350 17 G/17G
17 POWDER, FOR SOLUTION ORAL DAILY PRN
Status: DISCONTINUED | OUTPATIENT
Start: 2023-10-26 | End: 2023-10-27

## 2023-10-26 RX ORDER — CALCITRIOL 0.25 UG/1
0.25 CAPSULE, LIQUID FILLED ORAL DAILY
Status: DISCONTINUED | OUTPATIENT
Start: 2023-10-26 | End: 2023-10-27

## 2023-10-26 RX ORDER — NICOTINE POLACRILEX 4 MG
15 LOZENGE BUCCAL
Status: DISCONTINUED | OUTPATIENT
Start: 2023-10-26 | End: 2023-10-27

## 2023-10-26 RX ORDER — ONDANSETRON 2 MG/ML
INJECTION INTRAMUSCULAR; INTRAVENOUS
Status: COMPLETED
Start: 2023-10-26 | End: 2023-10-26

## 2023-10-26 RX ORDER — SENNOSIDES 8.6 MG
17.2 TABLET ORAL NIGHTLY PRN
Status: DISCONTINUED | OUTPATIENT
Start: 2023-10-26 | End: 2023-10-27

## 2023-10-26 RX ORDER — INSULIN ASPART 100 [IU]/ML
INJECTION, SOLUTION INTRAVENOUS; SUBCUTANEOUS ONCE
Status: COMPLETED | OUTPATIENT
Start: 2023-10-26 | End: 2023-10-26

## 2023-10-26 RX ORDER — NICOTINE POLACRILEX 4 MG
15 LOZENGE BUCCAL
Status: DISCONTINUED | OUTPATIENT
Start: 2023-10-26 | End: 2023-10-26 | Stop reason: HOSPADM

## 2023-10-26 RX ORDER — MORPHINE SULFATE 4 MG/ML
4 INJECTION, SOLUTION INTRAMUSCULAR; INTRAVENOUS EVERY 5 MIN PRN
Status: DISCONTINUED | OUTPATIENT
Start: 2023-10-26 | End: 2023-10-26 | Stop reason: HOSPADM

## 2023-10-26 RX ORDER — ONDANSETRON 2 MG/ML
INJECTION INTRAMUSCULAR; INTRAVENOUS AS NEEDED
Status: DISCONTINUED | OUTPATIENT
Start: 2023-10-26 | End: 2023-10-26 | Stop reason: SURG

## 2023-10-26 RX ORDER — ERGOCALCIFEROL 1.25 MG/1
50000 CAPSULE ORAL
COMMUNITY

## 2023-10-26 RX ORDER — ACETAMINOPHEN AND CODEINE PHOSPHATE 300; 30 MG/1; MG/1
1 TABLET ORAL ONCE AS NEEDED
Status: DISCONTINUED | OUTPATIENT
Start: 2023-10-26 | End: 2023-10-26 | Stop reason: HOSPADM

## 2023-10-26 RX ORDER — CALCIUM CARBONATE 500 MG/1
1500 TABLET, CHEWABLE ORAL
Status: DISCONTINUED | OUTPATIENT
Start: 2023-10-26 | End: 2023-10-27

## 2023-10-26 RX ORDER — ENEMA 19; 7 G/133ML; G/133ML
1 ENEMA RECTAL ONCE AS NEEDED
Status: DISCONTINUED | OUTPATIENT
Start: 2023-10-26 | End: 2023-10-27

## 2023-10-26 RX ORDER — ONDANSETRON 2 MG/ML
4 INJECTION INTRAMUSCULAR; INTRAVENOUS EVERY 6 HOURS PRN
Status: DISCONTINUED | OUTPATIENT
Start: 2023-10-26 | End: 2023-10-27

## 2023-10-26 RX ORDER — NICOTINE POLACRILEX 4 MG
30 LOZENGE BUCCAL
Status: DISCONTINUED | OUTPATIENT
Start: 2023-10-26 | End: 2023-10-26 | Stop reason: HOSPADM

## 2023-10-26 RX ORDER — CALCIUM CARBONATE 500(1250)
TABLET ORAL
Status: COMPLETED
Start: 2023-10-26 | End: 2023-10-26

## 2023-10-26 RX ORDER — MORPHINE SULFATE 4 MG/ML
INJECTION, SOLUTION INTRAMUSCULAR; INTRAVENOUS
Status: COMPLETED
Start: 2023-10-26 | End: 2023-10-26

## 2023-10-26 RX ORDER — CALCITRIOL 0.25 UG/1
0.25 CAPSULE, LIQUID FILLED ORAL DAILY
Qty: 90 CAPSULE | Refills: 2 | Status: SHIPPED | OUTPATIENT
Start: 2023-10-27

## 2023-10-26 RX ORDER — ACETAMINOPHEN 325 MG/1
650 TABLET ORAL
Status: DISCONTINUED | OUTPATIENT
Start: 2023-10-26 | End: 2023-10-27

## 2023-10-26 RX ORDER — MIDAZOLAM HYDROCHLORIDE 1 MG/ML
1 INJECTION INTRAMUSCULAR; INTRAVENOUS EVERY 5 MIN PRN
Status: DISCONTINUED | OUTPATIENT
Start: 2023-10-26 | End: 2023-10-26 | Stop reason: HOSPADM

## 2023-10-26 RX ORDER — NICOTINE POLACRILEX 4 MG
30 LOZENGE BUCCAL
Status: DISCONTINUED | OUTPATIENT
Start: 2023-10-26 | End: 2023-10-27

## 2023-10-26 RX ORDER — DEXTROSE MONOHYDRATE 25 G/50ML
50 INJECTION, SOLUTION INTRAVENOUS
Status: DISCONTINUED | OUTPATIENT
Start: 2023-10-26 | End: 2023-10-26 | Stop reason: HOSPADM

## 2023-10-26 RX ORDER — DEXAMETHASONE SODIUM PHOSPHATE 4 MG/ML
VIAL (ML) INJECTION AS NEEDED
Status: DISCONTINUED | OUTPATIENT
Start: 2023-10-26 | End: 2023-10-26 | Stop reason: SURG

## 2023-10-26 RX ORDER — MELATONIN
3 NIGHTLY PRN
Status: DISCONTINUED | OUTPATIENT
Start: 2023-10-26 | End: 2023-10-27

## 2023-10-26 RX ORDER — LIDOCAINE HYDROCHLORIDE 10 MG/ML
INJECTION, SOLUTION EPIDURAL; INFILTRATION; INTRACAUDAL; PERINEURAL AS NEEDED
Status: DISCONTINUED | OUTPATIENT
Start: 2023-10-26 | End: 2023-10-26 | Stop reason: SURG

## 2023-10-26 RX ORDER — ACETAMINOPHEN 325 MG/1
650 TABLET ORAL
Status: DISCONTINUED | OUTPATIENT
Start: 2023-10-26 | End: 2023-10-26

## 2023-10-26 RX ORDER — CALCIUM CARBONATE 500(1250)
1000 TABLET ORAL ONCE
Status: COMPLETED | OUTPATIENT
Start: 2023-10-26 | End: 2023-10-26

## 2023-10-26 RX ORDER — LEVOTHYROXINE SODIUM 0.05 MG/1
50 TABLET ORAL
Status: DISCONTINUED | OUTPATIENT
Start: 2023-10-27 | End: 2023-10-27

## 2023-10-26 RX ORDER — MORPHINE SULFATE 10 MG/ML
INJECTION, SOLUTION INTRAMUSCULAR; INTRAVENOUS
Status: DISCONTINUED
Start: 2023-10-26 | End: 2023-10-26 | Stop reason: WASHOUT

## 2023-10-26 RX ORDER — ONDANSETRON 2 MG/ML
4 INJECTION INTRAMUSCULAR; INTRAVENOUS EVERY 6 HOURS PRN
Status: DISCONTINUED | OUTPATIENT
Start: 2023-10-26 | End: 2023-10-26 | Stop reason: HOSPADM

## 2023-10-26 RX ORDER — DAPAGLIFLOZIN 10 MG/1
10 TABLET, FILM COATED ORAL DAILY
Status: ON HOLD | COMMUNITY
End: 2023-10-29

## 2023-10-26 RX ORDER — ECHINACEA PURPUREA EXTRACT 125 MG
1 TABLET ORAL
Status: DISCONTINUED | OUTPATIENT
Start: 2023-10-26 | End: 2023-10-27

## 2023-10-26 RX ORDER — MORPHINE SULFATE 4 MG/ML
6 INJECTION, SOLUTION INTRAMUSCULAR; INTRAVENOUS EVERY 5 MIN PRN
Status: DISCONTINUED | OUTPATIENT
Start: 2023-10-26 | End: 2023-10-26 | Stop reason: HOSPADM

## 2023-10-26 RX ORDER — SCOLOPAMINE TRANSDERMAL SYSTEM 1 MG/1
1 PATCH, EXTENDED RELEASE TRANSDERMAL ONCE
Status: DISCONTINUED | OUTPATIENT
Start: 2023-10-26 | End: 2023-10-26 | Stop reason: HOSPADM

## 2023-10-26 RX ORDER — LOSARTAN POTASSIUM 25 MG/1
25 TABLET ORAL NIGHTLY
Status: DISCONTINUED | OUTPATIENT
Start: 2023-10-26 | End: 2023-10-27

## 2023-10-26 RX ORDER — ACETAMINOPHEN AND CODEINE PHOSPHATE 300; 30 MG/1; MG/1
2 TABLET ORAL ONCE AS NEEDED
Status: DISCONTINUED | OUTPATIENT
Start: 2023-10-26 | End: 2023-10-26 | Stop reason: HOSPADM

## 2023-10-26 RX ORDER — LEVOTHYROXINE SODIUM 0.05 MG/1
50 TABLET ORAL
Qty: 90 TABLET | Refills: 0 | Status: SHIPPED | OUTPATIENT
Start: 2023-10-26

## 2023-10-26 RX ORDER — EZETIMIBE 10 MG/1
10 TABLET ORAL NIGHTLY
Status: DISCONTINUED | OUTPATIENT
Start: 2023-10-26 | End: 2023-10-27

## 2023-10-26 RX ORDER — MIDAZOLAM HYDROCHLORIDE 1 MG/ML
INJECTION INTRAMUSCULAR; INTRAVENOUS AS NEEDED
Status: DISCONTINUED | OUTPATIENT
Start: 2023-10-26 | End: 2023-10-26 | Stop reason: SURG

## 2023-10-26 RX ORDER — MORPHINE SULFATE 4 MG/ML
2 INJECTION, SOLUTION INTRAMUSCULAR; INTRAVENOUS EVERY 5 MIN PRN
Status: DISCONTINUED | OUTPATIENT
Start: 2023-10-26 | End: 2023-10-26 | Stop reason: HOSPADM

## 2023-10-26 RX ORDER — ACETAMINOPHEN 500 MG
1000 TABLET ORAL ONCE
Status: DISCONTINUED | OUTPATIENT
Start: 2023-10-26 | End: 2023-10-26 | Stop reason: HOSPADM

## 2023-10-26 RX ORDER — CEFAZOLIN SODIUM/WATER 2 G/20 ML
SYRINGE (ML) INTRAVENOUS AS NEEDED
Status: DISCONTINUED | OUTPATIENT
Start: 2023-10-26 | End: 2023-10-26 | Stop reason: SURG

## 2023-10-26 RX ORDER — CALCIUM CARBONATE 500 MG/1
1500 TABLET, CHEWABLE ORAL
Qty: 180 TABLET | Refills: 2 | Status: SHIPPED | OUTPATIENT
Start: 2023-10-26

## 2023-10-26 RX ORDER — INSULIN ASPART 100 [IU]/ML
INJECTION, SOLUTION INTRAVENOUS; SUBCUTANEOUS
Status: COMPLETED
Start: 2023-10-26 | End: 2023-10-26

## 2023-10-26 RX ORDER — CALCIUM CARBONATE 500 MG/1
1500 TABLET, CHEWABLE ORAL ONCE
Status: COMPLETED | OUTPATIENT
Start: 2023-10-26 | End: 2023-10-26

## 2023-10-26 RX ORDER — SODIUM CHLORIDE, SODIUM LACTATE, POTASSIUM CHLORIDE, CALCIUM CHLORIDE 600; 310; 30; 20 MG/100ML; MG/100ML; MG/100ML; MG/100ML
INJECTION, SOLUTION INTRAVENOUS CONTINUOUS
Status: DISCONTINUED | OUTPATIENT
Start: 2023-10-26 | End: 2023-10-26

## 2023-10-26 RX ORDER — SODIUM CHLORIDE, SODIUM LACTATE, POTASSIUM CHLORIDE, CALCIUM CHLORIDE 600; 310; 30; 20 MG/100ML; MG/100ML; MG/100ML; MG/100ML
INJECTION, SOLUTION INTRAVENOUS CONTINUOUS
Status: DISCONTINUED | OUTPATIENT
Start: 2023-10-26 | End: 2023-10-26 | Stop reason: HOSPADM

## 2023-10-26 RX ORDER — BISACODYL 10 MG
10 SUPPOSITORY, RECTAL RECTAL
Status: DISCONTINUED | OUTPATIENT
Start: 2023-10-26 | End: 2023-10-27

## 2023-10-26 RX ORDER — OXYCODONE HYDROCHLORIDE 10 MG/1
10 TABLET ORAL EVERY 4 HOURS PRN
Status: DISCONTINUED | OUTPATIENT
Start: 2023-10-26 | End: 2023-10-27

## 2023-10-26 RX ORDER — LEVOTHYROXINE SODIUM 0.05 MG/1
TABLET ORAL
COMMUNITY
Start: 2023-09-21

## 2023-10-26 RX ORDER — ACETAMINOPHEN 500 MG
1000 TABLET ORAL ONCE AS NEEDED
Status: DISCONTINUED | OUTPATIENT
Start: 2023-10-26 | End: 2023-10-26 | Stop reason: HOSPADM

## 2023-10-26 RX ORDER — CALCITRIOL 0.25 UG/1
CAPSULE, LIQUID FILLED ORAL
Status: COMPLETED
Start: 2023-10-26 | End: 2023-10-26

## 2023-10-26 RX ORDER — HYDROMORPHONE HYDROCHLORIDE 1 MG/ML
0.8 INJECTION, SOLUTION INTRAMUSCULAR; INTRAVENOUS; SUBCUTANEOUS EVERY 2 HOUR PRN
Status: DISCONTINUED | OUTPATIENT
Start: 2023-10-26 | End: 2023-10-27

## 2023-10-26 RX ORDER — LABETALOL HYDROCHLORIDE 5 MG/ML
INJECTION, SOLUTION INTRAVENOUS AS NEEDED
Status: DISCONTINUED | OUTPATIENT
Start: 2023-10-26 | End: 2023-10-26 | Stop reason: SURG

## 2023-10-26 RX ORDER — SODIUM CHLORIDE AND POTASSIUM CHLORIDE 150; 900 MG/100ML; MG/100ML
INJECTION, SOLUTION INTRAVENOUS CONTINUOUS
Status: DISCONTINUED | OUTPATIENT
Start: 2023-10-26 | End: 2023-10-27

## 2023-10-26 RX ORDER — HYDROMORPHONE HYDROCHLORIDE 1 MG/ML
INJECTION, SOLUTION INTRAMUSCULAR; INTRAVENOUS; SUBCUTANEOUS
Status: DISCONTINUED
Start: 2023-10-26 | End: 2023-10-26 | Stop reason: WASHOUT

## 2023-10-26 RX ORDER — METOCLOPRAMIDE HYDROCHLORIDE 5 MG/ML
10 INJECTION INTRAMUSCULAR; INTRAVENOUS EVERY 6 HOURS PRN
Status: DISCONTINUED | OUTPATIENT
Start: 2023-10-26 | End: 2023-10-27

## 2023-10-26 RX ORDER — NALOXONE HYDROCHLORIDE 0.4 MG/ML
0.08 INJECTION, SOLUTION INTRAMUSCULAR; INTRAVENOUS; SUBCUTANEOUS AS NEEDED
Status: DISCONTINUED | OUTPATIENT
Start: 2023-10-26 | End: 2023-10-26 | Stop reason: HOSPADM

## 2023-10-26 RX ORDER — PROCHLORPERAZINE EDISYLATE 5 MG/ML
5 INJECTION INTRAMUSCULAR; INTRAVENOUS EVERY 8 HOURS PRN
Status: DISCONTINUED | OUTPATIENT
Start: 2023-10-26 | End: 2023-10-26 | Stop reason: HOSPADM

## 2023-10-26 RX ADMIN — LABETALOL HYDROCHLORIDE 10 MG: 5 INJECTION, SOLUTION INTRAVENOUS at 11:30:00

## 2023-10-26 RX ADMIN — CEFAZOLIN SODIUM/WATER 2 G: 2 G/20 ML SYRINGE (ML) INTRAVENOUS at 09:40:00

## 2023-10-26 RX ADMIN — SODIUM CHLORIDE, SODIUM LACTATE, POTASSIUM CHLORIDE, CALCIUM CHLORIDE: 600; 310; 30; 20 INJECTION, SOLUTION INTRAVENOUS at 09:24:00

## 2023-10-26 RX ADMIN — LIDOCAINE HYDROCHLORIDE 5 ML: 10 INJECTION, SOLUTION EPIDURAL; INFILTRATION; INTRACAUDAL; PERINEURAL at 09:28:00

## 2023-10-26 RX ADMIN — SODIUM CHLORIDE, SODIUM LACTATE, POTASSIUM CHLORIDE, CALCIUM CHLORIDE: 600; 310; 30; 20 INJECTION, SOLUTION INTRAVENOUS at 11:38:00

## 2023-10-26 RX ADMIN — ONDANSETRON 4 MG: 2 INJECTION INTRAMUSCULAR; INTRAVENOUS at 09:52:00

## 2023-10-26 RX ADMIN — SODIUM CHLORIDE, SODIUM LACTATE, POTASSIUM CHLORIDE, CALCIUM CHLORIDE: 600; 310; 30; 20 INJECTION, SOLUTION INTRAVENOUS at 10:47:00

## 2023-10-26 RX ADMIN — DEXAMETHASONE SODIUM PHOSPHATE 4 MG: 4 MG/ML VIAL (ML) INJECTION at 09:52:00

## 2023-10-26 RX ADMIN — MIDAZOLAM HYDROCHLORIDE 2 MG: 1 INJECTION INTRAMUSCULAR; INTRAVENOUS at 09:26:00

## 2023-10-26 NOTE — ANESTHESIA POSTPROCEDURE EVALUATION
[address] Patient Status:  Outpatient in a Bed   Age/Gender 43year old female MRN [id number]   [facility] SURGERY Attending Keyanna Lei MD   Hosp Day # 0 PCP [address]       Anesthesia Post-op Note    Bilateral Total Thyroidectomy    Procedure Summary       Date: 10/26/23 Room / Location: [facility] OR 03 / 1404 [facility] OR    Anesthesia Start: 0798 Anesthesia Stop: 1138    Procedure: Bilateral Total Thyroidectomy (Bilateral) Diagnosis:       Nontoxic multinodular goiter      (Nontoxic multinodular goiter [E04.2])    Surgeons: Keyanna Lei MD Anesthesiologist: Kim Miller MD    Anesthesia Type: general ASA Status: 3            Anesthesia Type: general    Vitals Value Taken Time   /59 10/26/23 1139   Temp 99.1 10/26/23 1139   Pulse 107 10/26/23 1139   Resp 19 10/26/23 1139   SpO2 96% on RA 10/26/23 1139       Patient Location: PACU    Anesthesia Type: general    Airway Patency: patent and extubated    Postop Pain Control: adequate    Mental Status: mildly sedated but able to meaningfully participate in the post-anesthesia evaluation    Nausea/Vomiting: inadequate, being treated    Cardiopulmonary/Hydration status: stable euvolemic    Complications: no apparent anesthesia related complications    Postop vital signs: stable    Dental Exam: Unchanged from Preop    Patient to be discharged from PACU when criteria met.

## 2023-10-26 NOTE — INTERVAL H&P NOTE
Pre-op Diagnosis: Nontoxic multinodular goiter [E04.2]    The above referenced H&P was reviewed by Nica Guzman MD on 10/26/2023, the patient was examined and no significant changes have occurred in the patient's condition since the H&P was performed. I discussed with the patient and/or legal representative the potential benefits, risks and side effects of this procedure; the likelihood of the patient achieving goals; and potential problems that might occur during recuperation. I discussed reasonable alternatives to the procedure, including risks, benefits and side effects related to the alternatives and risks related to not receiving this procedure. We will proceed with procedure as planned.

## 2023-10-26 NOTE — ANESTHESIA PROCEDURE NOTES
Airway  Date/Time: 10/26/2023 9:31 AM  Urgency: elective    Airway not difficult    General Information and Staff    Patient location during procedure: OR  Anesthesiologist: Amy Eisenmenger, MD  Performed: anesthesiologist   Performed by: Amy Eisenmenger, MD  Authorized by: Amy Eisenmenger, MD      Indications and Patient Condition  Indications for airway management: anesthesia  Spontaneous Ventilation: absent  Sedation level: deep  Preoxygenated: yes  Patient position: sniffing  MILS maintained throughout  Mask difficulty assessment: 2 - vent by mask + OA or adjuvant +/- NMBA  No planned trial extubation    Final Airway Details  Final airway type: endotracheal airway      Successful airway: NIM tube  Cuffed: yes   Successful intubation technique: direct laryngoscopy  Facilitating devices/methods: intubating stylet and cricoid pressure  Endotracheal tube insertion site: oral  Blade: GlideScope  Blade size: #4  NIM tube Size: 7.0  Cormack-Lehane Classification: grade IIA - partial view of glottis  Placement verified by: capnometry   Measured from: lips  ETT to lips (cm): 20  Number of attempts at approach: 2  Ventilation between attempts: BVM  Number of other approaches attempted: 0

## 2023-10-26 NOTE — PROGRESS NOTES
NURSING ADMISSION NOTE      Patient admitted via Cart  Oriented to room 302. Safety precautions initiated. Bed in low position. Call light in reach.

## 2023-10-26 NOTE — DISCHARGE INSTRUCTIONS
[address] [facility]. [place], [address] [facility]  [phone]    [facility]  Dick Partida MD, MD Oscar Harrison MD    THYROIDECTOMY INSTRUCTIONS    What to Expect in the Postoperative Period  You will spend the night in the hospital.  During this time the wound is monitored, as are your calcium levels in the blood. Mild to moderate postoperative pain is normal.  This is easily controlled with medication. A surgical drain in the wound is sometimes necessary to prevent accumulation of fluid under the wound. This is removed before you leave the hospital.  Sore throat and discomfort with swallowing are due to general anesthesia and the surgery itself, and are short term only. Voice hoarseness may be temporary or permanent, depending on the nature of your thyroid problem. Usually the voice sounds normal within 1-2 weeks of surgery, but if not, please notify your surgeon. Decreased blood calcium levels may manifest as numbness and tingling around the mouth & fingers, spontaneous twitching in the face or mouth, and muscle spasm and cramping. Please notify your surgeon or go to the Emergency Room if this condition occurs. Treatment typically involves oral calcium supplements. Bleeding is rare after thyroid surgery. If this occurs, extensive swelling of the neck can occur and may need to be drained. Notify your surgeon or go the Emergency Room immediately. Activity and Restrictions  You will need to rest (off work or school) for 1 week after surgery. No vigorous activity, heavy lifting (greater than 20 pounds), bending or straining for 2 weeks. No driving for 1 week. Medication  You will be given a prescription for pain medication (do not drive or drink alcohol while taking this medication). You may also be given calcium supplements to prevent low calcium levels in the blood.     Follow up  You will be seen in the office 1 week after surgery (call for an appointment if you do not already have one. You may be instructed to follow-up with your primary care physician or an endocrinologist if long-term use of thyroid hormone replacement is necessary.

## 2023-10-26 NOTE — OPERATIVE REPORT
[facility]  Op Note    Tamara Thornton Location: OR   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 A shoulder roll was placed. 1% lidocaine with epinephrine was injected at the anterior neck. There is then prepped and draped in usual sterile fashion. The recurrent laryngeal nerve was monitored throughout the case. A #15 blade was used to make an incision at the anterior neck. There was carried down through the platysma muscle. Subplatysmal flaps were raised both superiorly and inferiorly. Self-retaining retractors were placed. Dissection was carried out midline between the strap musculature. Attention was turned to the right-hand side. Patient noted to have adherent tissue. I turned my attention to the superior pole. Numerous superior pole vessels were coming into the thyroid gland. These were clamped and tied off with silk ties. I then cleaned off tissue over the trachea. Smaller vessels were sealed with the harmonic scalpel. I turned my attention laterally. Identified the recurrent laryngeal nerve. It was stimulated and kept intact throughout the case. The parathyroids were carefully teased free of the thyroid capsule taking care to keep the blood supply intact. The right lobe of the thyroid was then rolled from lateral to medial coming across.'s ligament. Attention was turned to the left side. Once again the patient had adhered tissue may be suggestive of thyroiditis.   Once again vessels were clamped and tied off with silk ties. The recurrent laryngeal nerve was identified stimulated and kept intact throughout the case. The parathyroid glands were teased free of the thyroid capsule. The left lobe of the thyroid was then rolled from lateral to medial.  In this fashion a total thyroidectomy was performed. The thyroid tissue was inspected and I saw no evidence of adherent parathyroid tissue. It was sent to pathology for analysis. I expected the wound for any evidence of significant lymphadenopathy and there was none. The wound was copiously irrigated out with saline. There is no further signs of bleeding. The parathyroids were good color. The nerves were stimulated and noted to be intact. Surgicel powder was placed into the wound. The strap muscles were closed with Vicryl suture. The deep layers were closed with Vicryl suture. A running subcuticular 4-0 Monocryl stitch was placed on Steri-Strips and sterile dressing. Patient was then awoken expansions recovery room in stable condition. Findings: Hard indurated thyroid gland with adherent parathyroid tissue. Patient also had a hard calcified nodule inferiorly in the right thyroid lobe.       Nubia Rodgers MD  10/26/2023  11:30 AM

## 2023-10-27 VITALS
BODY MASS INDEX: 31.85 KG/M2 | HEART RATE: 87 BPM | RESPIRATION RATE: 17 BRPM | TEMPERATURE: 99 F | DIASTOLIC BLOOD PRESSURE: 80 MMHG | OXYGEN SATURATION: 97 % | SYSTOLIC BLOOD PRESSURE: 118 MMHG | WEIGHT: 158 LBS | HEIGHT: 59 IN

## 2023-10-27 PROBLEM — E66.9 DIABETES MELLITUS TYPE 2 IN OBESE  (HCC): Status: ACTIVE | Noted: 2023-10-27

## 2023-10-27 PROBLEM — E66.9 DIABETES MELLITUS TYPE 2 IN OBESE: Status: ACTIVE | Noted: 2023-10-27

## 2023-10-27 PROBLEM — E11.69 DIABETES MELLITUS TYPE 2 IN OBESE: Status: ACTIVE | Noted: 2023-10-27

## 2023-10-27 PROBLEM — E11.69 DIABETES MELLITUS TYPE 2 IN OBESE  (HCC): Status: ACTIVE | Noted: 2023-10-27

## 2023-10-27 PROBLEM — E11.69 DIABETES MELLITUS TYPE 2 IN OBESE (HCC): Status: ACTIVE | Noted: 2023-10-27

## 2023-10-27 PROBLEM — I10 BENIGN ESSENTIAL HTN: Status: ACTIVE | Noted: 2023-10-27

## 2023-10-27 PROBLEM — E66.9 DIABETES MELLITUS TYPE 2 IN OBESE (HCC): Status: ACTIVE | Noted: 2023-10-27

## 2023-10-27 LAB
ANION GAP SERPL CALC-SCNC: 6 MMOL/L (ref 0–18)
BASOPHILS # BLD AUTO: 0.02 X10(3) UL (ref 0–0.2)
BASOPHILS NFR BLD AUTO: 0.3 %
BUN BLD-MCNC: 6 MG/DL (ref 7–18)
CALCIUM BLD-MCNC: 9.4 MG/DL (ref 8.5–10.1)
CHLORIDE SERPL-SCNC: 105 MMOL/L (ref 98–112)
CO2 SERPL-SCNC: 26 MMOL/L (ref 21–32)
CREAT BLD-MCNC: 0.48 MG/DL
EGFRCR SERPLBLD CKD-EPI 2021: 121 ML/MIN/1.73M2 (ref 60–?)
EOSINOPHIL # BLD AUTO: 0.07 X10(3) UL (ref 0–0.7)
EOSINOPHIL NFR BLD AUTO: 1 %
ERYTHROCYTE [DISTWIDTH] IN BLOOD BY AUTOMATED COUNT: 12.5 %
GLUCOSE BLD-MCNC: 184 MG/DL (ref 70–99)
GLUCOSE BLD-MCNC: 202 MG/DL (ref 70–99)
GLUCOSE BLD-MCNC: 214 MG/DL (ref 70–99)
HCT VFR BLD AUTO: 39.4 %
HGB BLD-MCNC: 13.4 G/DL
IMM GRANULOCYTES # BLD AUTO: 0.02 X10(3) UL (ref 0–1)
IMM GRANULOCYTES NFR BLD: 0.3 %
LYMPHOCYTES # BLD AUTO: 1.41 X10(3) UL (ref 1–4)
LYMPHOCYTES NFR BLD AUTO: 20.6 %
MAGNESIUM SERPL-MCNC: 1.6 MG/DL (ref 1.6–2.6)
MCH RBC QN AUTO: 28.7 PG (ref 26–34)
MCHC RBC AUTO-ENTMCNC: 34 G/DL (ref 31–37)
MCV RBC AUTO: 84.4 FL
MONOCYTES # BLD AUTO: 0.53 X10(3) UL (ref 0.1–1)
MONOCYTES NFR BLD AUTO: 7.8 %
NEUTROPHILS # BLD AUTO: 4.78 X10 (3) UL (ref 1.5–7.7)
NEUTROPHILS # BLD AUTO: 4.78 X10(3) UL (ref 1.5–7.7)
NEUTROPHILS NFR BLD AUTO: 70 %
OSMOLALITY SERPL CALC.SUM OF ELEC: 286 MOSM/KG (ref 275–295)
PLATELET # BLD AUTO: 241 10(3)UL (ref 150–450)
POTASSIUM SERPL-SCNC: 4.4 MMOL/L (ref 3.5–5.1)
RBC # BLD AUTO: 4.67 X10(6)UL
SODIUM SERPL-SCNC: 137 MMOL/L (ref 136–145)
WBC # BLD AUTO: 6.8 X10(3) UL (ref 4–11)

## 2023-10-27 PROCEDURE — 99214 OFFICE O/P EST MOD 30 MIN: CPT | Performed by: HOSPITALIST

## 2023-10-27 NOTE — PLAN OF CARE
Pt pain managed with PO medications. Reports pain with swallowing, no difficulty swallowing. VSS. Voiding without difficulty. Plan: home when ready.

## 2023-10-27 NOTE — PROGRESS NOTES
Alert & oriented x4. VSS on room air. Voids. Tolerating regular diet. Ambulates independently. Denies nausea/chest pain/SOB. Pain controlled with prn oxycodone. Patient updated on plan of care. Questions and concerns addressed.

## 2023-10-27 NOTE — PROGRESS NOTES
NURSING DISCHARGE NOTE    Discharged Home via ambulatory  Accompanied by Support staff  Belongings Taken by patient/family. VSS, tolerating diet, voiding adequately, pain controlled with tylenol and oxycodone, tolerating ambulation. Discharge education provided. Reviewed medications and follow up appts. All questions answered and concerns addressed, pt verbalized understanding. IV removed. Pt dc in stable condition.  Patient refused wheelchair and left unit at [phone]

## 2023-10-27 NOTE — PLAN OF CARE
Pt reports \"a lot\" of tingling to bilateral hands. Notified Dr. Kingsotn Maldonado, see new orders.

## 2023-10-28 ENCOUNTER — HOSPITAL ENCOUNTER (OUTPATIENT)
Facility: HOSPITAL | Age: 43
Setting detail: OBSERVATION
Discharge: HOME | End: 2023-10-29
Attending: EMERGENCY MEDICINE | Admitting: HOSPITALIST
Payer: COMMERCIAL

## 2023-10-28 DIAGNOSIS — E89.0 S/P COMPLETE THYROIDECTOMY: ICD-10-CM

## 2023-10-28 DIAGNOSIS — G89.18 POST-OP PAIN: Primary | ICD-10-CM

## 2023-10-28 DIAGNOSIS — E16.2 HYPOGLYCEMIA: ICD-10-CM

## 2023-10-28 PROBLEM — Z98.890 S/P COMPLETE THYROIDECTOMY: Status: ACTIVE | Noted: 2023-10-28

## 2023-10-28 PROBLEM — Z90.89 S/P COMPLETE THYROIDECTOMY: Status: ACTIVE | Noted: 2023-10-28

## 2023-10-28 PROBLEM — I10 PRIMARY HYPERTENSION: Status: ACTIVE | Noted: 2023-10-27

## 2023-10-28 LAB
ALBUMIN SERPL-MCNC: 4.2 G/DL (ref 3.4–5)
ALBUMIN/GLOB SERPL: 1.1 {RATIO} (ref 1–2)
ALP LIVER SERPL-CCNC: 98 U/L
ALT SERPL-CCNC: 173 U/L
ANION GAP SERPL CALC-SCNC: 8 MMOL/L (ref 0–18)
AST SERPL-CCNC: 90 U/L (ref 15–37)
BASOPHILS # BLD AUTO: 0.04 X10(3) UL (ref 0–0.2)
BASOPHILS NFR BLD AUTO: 0.5 %
BILIRUB SERPL-MCNC: 1 MG/DL (ref 0.1–2)
BUN BLD-MCNC: 7 MG/DL (ref 7–18)
CALCIUM BLD-MCNC: 9.1 MG/DL (ref 8.5–10.1)
CHLORIDE SERPL-SCNC: 101 MMOL/L (ref 98–112)
CO2 SERPL-SCNC: 25 MMOL/L (ref 21–32)
CREAT BLD-MCNC: 0.57 MG/DL
EGFRCR SERPLBLD CKD-EPI 2021: 116 ML/MIN/1.73M2 (ref 60–?)
EOSINOPHIL # BLD AUTO: 0.19 X10(3) UL (ref 0–0.7)
EOSINOPHIL NFR BLD AUTO: 2.2 %
ERYTHROCYTE [DISTWIDTH] IN BLOOD BY AUTOMATED COUNT: 12.3 %
GLOBULIN PLAS-MCNC: 4 G/DL (ref 2.8–4.4)
GLUCOSE BLD-MCNC: 123 MG/DL (ref 70–99)
GLUCOSE BLD-MCNC: 126 MG/DL (ref 70–99)
HCT VFR BLD AUTO: 45.4 %
HGB BLD-MCNC: 15.8 G/DL
IMM GRANULOCYTES # BLD AUTO: 0.05 X10(3) UL (ref 0–1)
IMM GRANULOCYTES NFR BLD: 0.6 %
LYMPHOCYTES # BLD AUTO: 1.9 X10(3) UL (ref 1–4)
LYMPHOCYTES NFR BLD AUTO: 21.5 %
MCH RBC QN AUTO: 28.6 PG (ref 26–34)
MCHC RBC AUTO-ENTMCNC: 34.8 G/DL (ref 31–37)
MCV RBC AUTO: 82.1 FL
MONOCYTES # BLD AUTO: 0.58 X10(3) UL (ref 0.1–1)
MONOCYTES NFR BLD AUTO: 6.6 %
NEUTROPHILS # BLD AUTO: 6.06 X10 (3) UL (ref 1.5–7.7)
NEUTROPHILS # BLD AUTO: 6.06 X10(3) UL (ref 1.5–7.7)
NEUTROPHILS NFR BLD AUTO: 68.6 %
OSMOLALITY SERPL CALC.SUM OF ELEC: 278 MOSM/KG (ref 275–295)
PLATELET # BLD AUTO: 250 10(3)UL (ref 150–450)
POTASSIUM SERPL-SCNC: 3.8 MMOL/L (ref 3.5–5.1)
PROT SERPL-MCNC: 8.2 G/DL (ref 6.4–8.2)
RBC # BLD AUTO: 5.53 X10(6)UL
SODIUM SERPL-SCNC: 134 MMOL/L (ref 136–145)
TSI SER-ACNC: 3.26 MIU/ML (ref 0.36–3.74)
WBC # BLD AUTO: 8.8 X10(3) UL (ref 4–11)

## 2023-10-28 PROCEDURE — 99223 1ST HOSP IP/OBS HIGH 75: CPT | Performed by: STUDENT IN AN ORGANIZED HEALTH CARE EDUCATION/TRAINING PROGRAM

## 2023-10-28 RX ORDER — DEXTROSE MONOHYDRATE 25 G/50ML
50 INJECTION, SOLUTION INTRAVENOUS
Status: DISCONTINUED | OUTPATIENT
Start: 2023-10-28 | End: 2023-10-29

## 2023-10-28 RX ORDER — SODIUM CHLORIDE, SODIUM LACTATE, POTASSIUM CHLORIDE, CALCIUM CHLORIDE 600; 310; 30; 20 MG/100ML; MG/100ML; MG/100ML; MG/100ML
INJECTION, SOLUTION INTRAVENOUS CONTINUOUS
Status: DISCONTINUED | OUTPATIENT
Start: 2023-10-28 | End: 2023-10-29

## 2023-10-28 RX ORDER — EZETIMIBE 10 MG/1
10 TABLET ORAL NIGHTLY
Status: DISCONTINUED | OUTPATIENT
Start: 2023-10-29 | End: 2023-10-29

## 2023-10-28 RX ORDER — MORPHINE SULFATE 2 MG/ML
1 INJECTION, SOLUTION INTRAMUSCULAR; INTRAVENOUS EVERY 2 HOUR PRN
Status: DISCONTINUED | OUTPATIENT
Start: 2023-10-28 | End: 2023-10-29

## 2023-10-28 RX ORDER — ECHINACEA PURPUREA EXTRACT 125 MG
1 TABLET ORAL
Status: DISCONTINUED | OUTPATIENT
Start: 2023-10-28 | End: 2023-10-29

## 2023-10-28 RX ORDER — MORPHINE SULFATE 2 MG/ML
2 INJECTION, SOLUTION INTRAMUSCULAR; INTRAVENOUS ONCE
Status: COMPLETED | OUTPATIENT
Start: 2023-10-28 | End: 2023-10-28

## 2023-10-28 RX ORDER — BENZONATATE 100 MG/1
200 CAPSULE ORAL 3 TIMES DAILY PRN
Status: DISCONTINUED | OUTPATIENT
Start: 2023-10-28 | End: 2023-10-29

## 2023-10-28 RX ORDER — NICOTINE POLACRILEX 4 MG
15 LOZENGE BUCCAL
Status: DISCONTINUED | OUTPATIENT
Start: 2023-10-28 | End: 2023-10-29

## 2023-10-28 RX ORDER — SODIUM CHLORIDE 9 MG/ML
125 INJECTION, SOLUTION INTRAVENOUS CONTINUOUS
Status: DISCONTINUED | OUTPATIENT
Start: 2023-10-28 | End: 2023-10-29

## 2023-10-28 RX ORDER — MORPHINE SULFATE 4 MG/ML
4 INJECTION, SOLUTION INTRAMUSCULAR; INTRAVENOUS EVERY 2 HOUR PRN
Status: DISCONTINUED | OUTPATIENT
Start: 2023-10-28 | End: 2023-10-29

## 2023-10-28 RX ORDER — SENNOSIDES 8.6 MG
17.2 TABLET ORAL NIGHTLY PRN
Status: DISCONTINUED | OUTPATIENT
Start: 2023-10-28 | End: 2023-10-29

## 2023-10-28 RX ORDER — LEVOTHYROXINE SODIUM 0.05 MG/1
50 TABLET ORAL
Status: DISCONTINUED | OUTPATIENT
Start: 2023-10-29 | End: 2023-10-29

## 2023-10-28 RX ORDER — POLYETHYLENE GLYCOL 3350 17 G/17G
17 POWDER, FOR SOLUTION ORAL DAILY PRN
Status: DISCONTINUED | OUTPATIENT
Start: 2023-10-28 | End: 2023-10-29

## 2023-10-28 RX ORDER — NICOTINE POLACRILEX 4 MG
30 LOZENGE BUCCAL
Status: DISCONTINUED | OUTPATIENT
Start: 2023-10-28 | End: 2023-10-29

## 2023-10-28 RX ORDER — ENOXAPARIN SODIUM 100 MG/ML
40 INJECTION SUBCUTANEOUS DAILY
Status: DISCONTINUED | OUTPATIENT
Start: 2023-10-29 | End: 2023-10-29

## 2023-10-28 RX ORDER — ONDANSETRON 2 MG/ML
4 INJECTION INTRAMUSCULAR; INTRAVENOUS EVERY 6 HOURS PRN
Status: DISCONTINUED | OUTPATIENT
Start: 2023-10-28 | End: 2023-10-29

## 2023-10-28 RX ORDER — CALCITRIOL 0.25 UG/1
0.25 CAPSULE, LIQUID FILLED ORAL DAILY
Status: DISCONTINUED | OUTPATIENT
Start: 2023-10-29 | End: 2023-10-29

## 2023-10-28 RX ORDER — OXYCODONE HYDROCHLORIDE 5 MG/1
5 TABLET ORAL EVERY 4 HOURS PRN
Status: DISCONTINUED | OUTPATIENT
Start: 2023-10-28 | End: 2023-10-29

## 2023-10-28 RX ORDER — BISACODYL 10 MG
10 SUPPOSITORY, RECTAL RECTAL
Status: DISCONTINUED | OUTPATIENT
Start: 2023-10-28 | End: 2023-10-29

## 2023-10-28 RX ORDER — MELATONIN
3 NIGHTLY PRN
Status: DISCONTINUED | OUTPATIENT
Start: 2023-10-28 | End: 2023-10-29

## 2023-10-28 RX ORDER — MORPHINE SULFATE 2 MG/ML
2 INJECTION, SOLUTION INTRAMUSCULAR; INTRAVENOUS EVERY 2 HOUR PRN
Status: DISCONTINUED | OUTPATIENT
Start: 2023-10-28 | End: 2023-10-29

## 2023-10-28 RX ORDER — CALCIUM CARBONATE 500 MG/1
1500 TABLET, CHEWABLE ORAL
Status: DISCONTINUED | OUTPATIENT
Start: 2023-10-29 | End: 2023-10-29

## 2023-10-28 RX ORDER — ACETAMINOPHEN 500 MG
1000 TABLET ORAL EVERY 8 HOURS PRN
Status: DISCONTINUED | OUTPATIENT
Start: 2023-10-28 | End: 2023-10-29

## 2023-10-28 RX ORDER — LOSARTAN POTASSIUM 25 MG/1
25 TABLET ORAL NIGHTLY
Status: DISCONTINUED | OUTPATIENT
Start: 2023-10-29 | End: 2023-10-29

## 2023-10-28 RX ORDER — PROCHLORPERAZINE EDISYLATE 5 MG/ML
5 INJECTION INTRAMUSCULAR; INTRAVENOUS EVERY 8 HOURS PRN
Status: DISCONTINUED | OUTPATIENT
Start: 2023-10-28 | End: 2023-10-29

## 2023-10-28 NOTE — ED INITIAL ASSESSMENT (HPI)
Patient presents to the ED with c/o hyperglycemia, nausea, and post-op complications after thyroidectomy. Patient had surgery Thursday morning and then was released yesterday. Patient states that her blood sugars have been elevated in the 400's and they were high prior to discharge. Patient also states that her incision site appears to be opening.

## 2023-10-29 VITALS
RESPIRATION RATE: 16 BRPM | SYSTOLIC BLOOD PRESSURE: 112 MMHG | HEART RATE: 100 BPM | DIASTOLIC BLOOD PRESSURE: 77 MMHG | OXYGEN SATURATION: 98 % | TEMPERATURE: 98 F

## 2023-10-29 LAB
ALBUMIN SERPL-MCNC: 3.5 G/DL (ref 3.4–5)
ALBUMIN/GLOB SERPL: 1 {RATIO} (ref 1–2)
ALP LIVER SERPL-CCNC: 84 U/L
ALT SERPL-CCNC: 122 U/L
ANION GAP SERPL CALC-SCNC: 11 MMOL/L (ref 0–18)
AST SERPL-CCNC: 51 U/L (ref 15–37)
BASOPHILS # BLD AUTO: 0.05 X10(3) UL (ref 0–0.2)
BASOPHILS NFR BLD AUTO: 0.7 %
BILIRUB SERPL-MCNC: 1 MG/DL (ref 0.1–2)
BUN BLD-MCNC: 6 MG/DL (ref 7–18)
C DIFF TOX B STL QL: NEGATIVE
CALCIUM BLD-MCNC: 8.4 MG/DL (ref 8.5–10.1)
CHLORIDE SERPL-SCNC: 105 MMOL/L (ref 98–112)
CO2 SERPL-SCNC: 20 MMOL/L (ref 21–32)
CREAT BLD-MCNC: 0.35 MG/DL
EGFRCR SERPLBLD CKD-EPI 2021: 131 ML/MIN/1.73M2 (ref 60–?)
EOSINOPHIL # BLD AUTO: 0.25 X10(3) UL (ref 0–0.7)
EOSINOPHIL NFR BLD AUTO: 3.7 %
ERYTHROCYTE [DISTWIDTH] IN BLOOD BY AUTOMATED COUNT: 12.3 %
EST. AVERAGE GLUCOSE BLD GHB EST-MCNC: 180 MG/DL (ref 68–126)
GLOBULIN PLAS-MCNC: 3.6 G/DL (ref 2.8–4.4)
GLUCOSE BLD-MCNC: 135 MG/DL (ref 70–99)
GLUCOSE BLD-MCNC: 140 MG/DL (ref 70–99)
GLUCOSE BLD-MCNC: 94 MG/DL (ref 70–99)
GLUCOSE BLD-MCNC: 96 MG/DL (ref 70–99)
HBA1C MFR BLD: 7.9 % (ref ?–5.7)
HCT VFR BLD AUTO: 41.6 %
HGB BLD-MCNC: 14.4 G/DL
IMM GRANULOCYTES # BLD AUTO: 0.03 X10(3) UL (ref 0–1)
IMM GRANULOCYTES NFR BLD: 0.4 %
INR BLD: 1.07 (ref 0.85–1.16)
LYMPHOCYTES # BLD AUTO: 1.77 X10(3) UL (ref 1–4)
LYMPHOCYTES NFR BLD AUTO: 26.1 %
MAGNESIUM SERPL-MCNC: 1.8 MG/DL (ref 1.6–2.6)
MCH RBC QN AUTO: 28.7 PG (ref 26–34)
MCHC RBC AUTO-ENTMCNC: 34.6 G/DL (ref 31–37)
MCV RBC AUTO: 83 FL
MONOCYTES # BLD AUTO: 0.49 X10(3) UL (ref 0.1–1)
MONOCYTES NFR BLD AUTO: 7.2 %
NEUTROPHILS # BLD AUTO: 4.2 X10 (3) UL (ref 1.5–7.7)
NEUTROPHILS # BLD AUTO: 4.2 X10(3) UL (ref 1.5–7.7)
NEUTROPHILS NFR BLD AUTO: 61.9 %
OSMOLALITY SERPL CALC.SUM OF ELEC: 279 MOSM/KG (ref 275–295)
PHOSPHATE SERPL-MCNC: 4.5 MG/DL (ref 2.5–4.9)
PLATELET # BLD AUTO: 228 10(3)UL (ref 150–450)
POTASSIUM SERPL-SCNC: 3.6 MMOL/L (ref 3.5–5.1)
PROT SERPL-MCNC: 7.1 G/DL (ref 6.4–8.2)
PROTHROMBIN TIME: 13.9 SECONDS (ref 11.6–14.8)
RBC # BLD AUTO: 5.01 X10(6)UL
SODIUM SERPL-SCNC: 136 MMOL/L (ref 136–145)
WBC # BLD AUTO: 6.8 X10(3) UL (ref 4–11)

## 2023-10-29 PROCEDURE — 99239 HOSP IP/OBS DSCHRG MGMT >30: CPT | Performed by: HOSPITALIST

## 2023-10-29 PROCEDURE — 99232 SBSQ HOSP IP/OBS MODERATE 35: CPT | Performed by: OTOLARYNGOLOGY

## 2023-10-29 RX ORDER — MORPHINE SULFATE 4 MG/ML
4 INJECTION, SOLUTION INTRAMUSCULAR; INTRAVENOUS EVERY 30 MIN PRN
Status: DISCONTINUED | OUTPATIENT
Start: 2023-10-29 | End: 2023-10-29

## 2023-10-29 RX ORDER — METFORMIN HYDROCHLORIDE 500 MG/1
500 TABLET, EXTENDED RELEASE ORAL 2 TIMES DAILY WITH MEALS
Status: SHIPPED | COMMUNITY
Start: 2023-10-29

## 2023-10-29 RX ORDER — ORAL SEMAGLUTIDE 3 MG/1
1 TABLET ORAL DAILY
Status: SHIPPED | COMMUNITY
Start: 2023-10-29

## 2023-10-29 RX ORDER — ONDANSETRON 2 MG/ML
4 INJECTION INTRAMUSCULAR; INTRAVENOUS EVERY 4 HOURS PRN
Status: DISCONTINUED | OUTPATIENT
Start: 2023-10-29 | End: 2023-10-29

## 2023-10-29 RX ORDER — MAGNESIUM SULFATE HEPTAHYDRATE 40 MG/ML
2 INJECTION, SOLUTION INTRAVENOUS ONCE
Status: COMPLETED | OUTPATIENT
Start: 2023-10-29 | End: 2023-10-29

## 2023-10-29 RX ORDER — DAPAGLIFLOZIN 10 MG/1
10 TABLET, FILM COATED ORAL
Status: SHIPPED | COMMUNITY
Start: 2023-10-29

## 2023-10-29 RX ORDER — DEXTROSE AND SODIUM CHLORIDE 5; .45 G/100ML; G/100ML
INJECTION, SOLUTION INTRAVENOUS CONTINUOUS
Status: ACTIVE | OUTPATIENT
Start: 2023-10-29 | End: 2023-10-29

## 2023-10-29 NOTE — PROGRESS NOTES
NURSING DISCHARGE NOTE    Discharged Home via Ambulatory. Accompanied by Support staff  Belongings Taken by patient/family. discussed discharge instructions with patient , answered all questions .  Verbalized understanding

## 2023-10-29 NOTE — ED QUICK NOTES
Orders for admission, patient is aware of plan and ready to go upstairs. Any questions, please call ED  [address] at extension [phone]. Patient Covid vaccination status: Fully vaccinated     COVID Test Ordered in ED: None    COVID Suspicion at Admission: N/A    Running Infusions:    sodium chloride 125 mL/hr (10/28/23 1045)        Mental Status/LOC at time of transport: A/A/O x 4. Other pertinent information: Pt ambulatory with steady gait noted.   CIWA score: N/A   NIH score:  N/A

## 2023-10-29 NOTE — PROGRESS NOTES
Pt feeling better today. She is able to swallow without difficulty. Afeb  VSS  Dressing intact with no hematoma. Ca 8.4 may be a reflection of hydration  Home today if pain controlled and tolerating PO intake.

## 2023-10-29 NOTE — PLAN OF CARE
Problem: PAIN - ADULT  Goal: Verbalizes/displays adequate comfort level or patient's stated pain goal  Description: INTERVENTIONS:  - Encourage pt to monitor pain and request assistance  - Assess pain using appropriate pain scale  - Administer analgesics based on type and severity of pain and evaluate response  - Implement non-pharmacological measures as appropriate and evaluate response  - Consider cultural and social influences on pain and pain management  - Manage/alleviate anxiety  - Utilize distraction and/or relaxation techniques  - Monitor for opioid side effects  - Notify MD/LIP if interventions unsuccessful or patient reports new pain  - Anticipate increased pain with activity and pre-medicate as appropriate  Outcome: Adequate for Discharge     Problem: RISK FOR INFECTION - ADULT  Goal: Absence of fever/infection during anticipated neutropenic period  Description: INTERVENTIONS  - Monitor WBC  - Administer growth factors as ordered  - Implement neutropenic guidelines  Outcome: Adequate for Discharge     Problem: SAFETY ADULT - FALL  Goal: Free from fall injury  Description: INTERVENTIONS:  - Assess pt frequently for physical needs  - Identify cognitive and physical deficits and behaviors that affect risk of falls.   - [place] fall precautions as indicated by assessment.  - Educate pt/family on patient safety including physical limitations  - Instruct pt to call for assistance with activity based on assessment  - Modify environment to reduce risk of injury  - Provide assistive devices as appropriate  - Consider OT/PT consult to assist with strengthening/mobility  - Encourage toileting schedule  Outcome: Adequate for Discharge     Problem: DISCHARGE PLANNING  Goal: Discharge to home or other facility with appropriate resources  Description: INTERVENTIONS:  - Identify barriers to discharge w/pt and caregiver  - Include patient/family/discharge partner in discharge planning  - Arrange for needed discharge resources and transportation as appropriate  - Identify discharge learning needs (meds, wound care, etc)  - Arrange for interpreters to assist at discharge as needed  - Consider post-discharge preferences of patient/family/discharge partner  - Complete POLST form as appropriate  - Assess patient's ability to be responsible for managing their own health  - Refer to Case Management Department for coordinating discharge planning if the patient needs post-hospital services based on physician/LIP order or complex needs related to functional status, cognitive ability or social support system  Outcome: Adequate for Discharge   Alert and orient x 4 , on room air , ant neck incision dry and intact , able to swallow , tolerated  pain with oral pain medicine , up in room , voids well . Tolerated diet well . Plan of care discussed , answered all questions . Verbalized understanding .  Will continue to monitor

## 2023-10-29 NOTE — PLAN OF CARE
Pt A&Ox4, resting in bed. Resp: RA  Cardiac: NSR  GI/: constipation; voids normally  Activity/Safety: up ad mavis  Skin: neck incision s/p thyroidectomy; c/d/i  Pain: mild pain at incision, oxy given  Pt updated on and agreeable to POC; IV fluids, pain control. Subjective:      Viviana Cortez is a 14 y.o. female who presents with Bug Bite (x 5 days, bug bite on Rt. upper arm)            Other   This is a new problem. The current episode started in the past 7 days (abscess r arm). The problem occurs constantly. The problem has been rapidly worsening. Pertinent negatives include no fever or swollen glands. The symptoms are aggravated by bending. She has tried rest for the symptoms. The treatment provided mild relief.       Review of Systems   Constitutional: Negative.  Negative for fever.   Musculoskeletal: Negative.    Skin: Negative.    Neurological: Negative.           Objective:     /70   Pulse 77   Temp 36.3 °C (97.3 °F)   Resp 16   Wt 47.6 kg (105 lb)   SpO2 97%      Physical Exam   Constitutional: She is oriented to person, place, and time. She appears well-developed and well-nourished. No distress.   Neurological: She is alert and oriented to person, place, and time.   Skin: Skin is warm and dry. Capillary refill takes less than 2 seconds. There is erythema (r arm sm abscess; proc- 1%lidoc local; I/D w/#15bl, mild pus dc, bld; abx dssg).   Psychiatric: She has a normal mood and affect. Her behavior is normal.   Nursing note and vitals reviewed.    Vitals:    01/24/18 1606   BP: 104/70   Pulse: 77   Resp: 16   Temp: 36.3 °C (97.3 °F)   SpO2: 97%   Weight: 47.6 kg (105 lb)     Active Ambulatory Problems     Diagnosis Date Noted   • No Active Ambulatory Problems     Resolved Ambulatory Problems     Diagnosis Date Noted   • No Resolved Ambulatory Problems     Past Medical History:   Diagnosis Date   • ASTHMA      Current Outpatient Prescriptions on File Prior to Visit   Medication Sig Dispense Refill   • NON SPECIFIED Currently taking birth control     • montelukast (SINGULAIR) 5 MG Chew Tab Take 1 Tab by mouth every day. TAKE 1 TAB BY MOUTH EVERY DAY FOR 30 DAYS. 30 Tab 4   • SYMBICORT 160-4.5 MCG/ACT Aerosol INHALE 2 PUFFS BY MOUTH EVERY DAY.  10.2 Inhaler 3   • Loratadine (CLARITIN CHILDRENS PO) Take  by mouth.     • Ibuprofen (ADVIL PO) Take  by mouth.       No current facility-administered medications on file prior to visit.      Social History     Social History Main Topics   • Smoking status: Never Smoker   • Smokeless tobacco: Never Used   • Alcohol use Not on file   • Drug use: Unknown   • Sexual activity: Not on file     Other Topics Concern   • Not on file     Social History Narrative   • No narrative on file     No family history on file.  Other environmental              Assessment/Plan:     ·  arm abscess      · rx meds; cx

## 2023-11-02 ENCOUNTER — OFFICE VISIT (OUTPATIENT)
Facility: LOCATION | Age: 43
End: 2023-11-02
Payer: COMMERCIAL

## 2023-11-02 DIAGNOSIS — C73 PAPILLARY THYROID CARCINOMA (HCC): Primary | ICD-10-CM

## 2023-11-02 PROCEDURE — 99024 POSTOP FOLLOW-UP VISIT: CPT | Performed by: OTOLARYNGOLOGY

## 2023-11-02 NOTE — PROGRESS NOTES
She is postop thyroidectomy for papillary thyroid carcinoma. She had elevated blood sugars which caused her to go back in the hospital for a day. Those symptoms are improving. Her sore throat is much better. She has had some mild tingling. She is currently only taking Rocaltrol and not the Tums. Exam reveals the incision to be healing well. There is no signs of infection or hematoma. Stable status post thyroidectomy for papillary thyroid carcinoma. She will continue on Rocaltrol. She will take Tums a few times a day. She will follow-up with endocrinology for consideration of radioactive iodine.

## 2023-11-10 ENCOUNTER — OFFICE VISIT (OUTPATIENT)
Facility: CLINIC | Age: 43
End: 2023-11-10
Payer: COMMERCIAL

## 2023-11-10 VITALS
HEART RATE: 89 BPM | HEIGHT: 59 IN | OXYGEN SATURATION: 98 % | BODY MASS INDEX: 31.85 KG/M2 | SYSTOLIC BLOOD PRESSURE: 100 MMHG | WEIGHT: 158 LBS | DIASTOLIC BLOOD PRESSURE: 58 MMHG

## 2023-11-10 DIAGNOSIS — C73 THYROID CANCER (HCC): Primary | ICD-10-CM

## 2023-11-10 PROCEDURE — 99205 OFFICE O/P NEW HI 60 MIN: CPT | Performed by: STUDENT IN AN ORGANIZED HEALTH CARE EDUCATION/TRAINING PROGRAM

## 2023-11-10 PROCEDURE — 3078F DIAST BP <80 MM HG: CPT | Performed by: STUDENT IN AN ORGANIZED HEALTH CARE EDUCATION/TRAINING PROGRAM

## 2023-11-10 PROCEDURE — 3074F SYST BP LT 130 MM HG: CPT | Performed by: STUDENT IN AN ORGANIZED HEALTH CARE EDUCATION/TRAINING PROGRAM

## 2023-11-10 PROCEDURE — 3008F BODY MASS INDEX DOCD: CPT | Performed by: STUDENT IN AN ORGANIZED HEALTH CARE EDUCATION/TRAINING PROGRAM

## 2023-11-10 NOTE — PATIENT INSTRUCTIONS
Return Visit   [ x ] Physician in 5 weeks   [  ] In person or video visit  [  ] In person only    [ x ] After visit summary   [ x ] Placed labs/imaging    [ x ] Central scheduling # for ultrasound   [  ] Directions to 1st floor lab  [  ] Dental clearance form       It was great seeing you today! Today we discussed your new diagnosis of papillary thyroid cancer:  -We will start levothyroxine since you are having symptoms of hypothyroidism. You are prescribed 50 mcg daily. We will increase this to 50mcg (1 tab) Monday-Thursday and 100mcg (2 tabs) Friday-Sunday  -You will also increase your calcium to 1500mg daily (3 tabs) from the 1000 mg daily (2 tabs) that you were taking previously. If you have symptoms of numbness, please increase further to 2000 mg daily (4 tabs) after 1 week   -Please complete your blood work which I have ordered for the first week of December ( this is around 6 weeks after your surgery).  Your labs are in     Take care!  -Dr. Luigi Reyes

## 2023-12-08 ENCOUNTER — TELEPHONE (OUTPATIENT)
Facility: CLINIC | Age: 43
End: 2023-12-08

## 2023-12-08 ENCOUNTER — LAB ENCOUNTER (OUTPATIENT)
Dept: LAB | Age: 43
End: 2023-12-08
Attending: STUDENT IN AN ORGANIZED HEALTH CARE EDUCATION/TRAINING PROGRAM
Payer: COMMERCIAL

## 2023-12-08 DIAGNOSIS — C73 THYROID CANCER (HCC): ICD-10-CM

## 2023-12-08 LAB
ANION GAP SERPL CALC-SCNC: 11 MMOL/L (ref 0–18)
BUN BLD-MCNC: 9 MG/DL (ref 9–23)
CALCIUM BLD-MCNC: 10.1 MG/DL (ref 8.5–10.1)
CHLORIDE SERPL-SCNC: 105 MMOL/L (ref 98–112)
CO2 SERPL-SCNC: 23 MMOL/L (ref 21–32)
CREAT BLD-MCNC: 0.6 MG/DL
EGFRCR SERPLBLD CKD-EPI 2021: 115 ML/MIN/1.73M2 (ref 60–?)
FASTING STATUS PATIENT QL REPORTED: NO
GLUCOSE BLD-MCNC: 134 MG/DL (ref 70–99)
OSMOLALITY SERPL CALC.SUM OF ELEC: 289 MOSM/KG (ref 275–295)
POTASSIUM SERPL-SCNC: 3.7 MMOL/L (ref 3.5–5.1)
PTH-INTACT SERPL-MCNC: 9.9 PG/ML (ref 18.5–88)
SODIUM SERPL-SCNC: 139 MMOL/L (ref 136–145)
T4 FREE SERPL-MCNC: 0.2 NG/DL (ref 0.8–1.7)
TSI SER-ACNC: >100 MIU/ML (ref 0.36–3.74)
VIT D+METAB SERPL-MCNC: 45 NG/ML (ref 30–100)

## 2023-12-08 PROCEDURE — 86800 THYROGLOBULIN ANTIBODY: CPT

## 2023-12-08 PROCEDURE — 80048 BASIC METABOLIC PNL TOTAL CA: CPT

## 2023-12-08 PROCEDURE — 36415 COLL VENOUS BLD VENIPUNCTURE: CPT

## 2023-12-08 PROCEDURE — 83970 ASSAY OF PARATHORMONE: CPT

## 2023-12-08 PROCEDURE — 84439 ASSAY OF FREE THYROXINE: CPT

## 2023-12-08 PROCEDURE — 84443 ASSAY THYROID STIM HORMONE: CPT

## 2023-12-08 PROCEDURE — 82306 VITAMIN D 25 HYDROXY: CPT

## 2023-12-08 NOTE — TELEPHONE ENCOUNTER
Patient would like Lab orders sent to [facility] instead of [facility]. Rescheduled appointment for Friday 12/15 giving her enough time to get labs completed.

## 2023-12-11 LAB
THYROGLOB AB: <1 IU/ML
THYROGLOB IMA: 143.4 NG/ML

## 2023-12-14 ENCOUNTER — HOSPITAL ENCOUNTER (OUTPATIENT)
Dept: ULTRASOUND IMAGING | Age: 43
Discharge: HOME | End: 2023-12-14
Attending: STUDENT IN AN ORGANIZED HEALTH CARE EDUCATION/TRAINING PROGRAM
Payer: COMMERCIAL

## 2023-12-14 DIAGNOSIS — C73 THYROID CANCER (HCC): ICD-10-CM

## 2023-12-14 PROCEDURE — 76536 US EXAM OF HEAD AND NECK: CPT | Performed by: STUDENT IN AN ORGANIZED HEALTH CARE EDUCATION/TRAINING PROGRAM

## 2023-12-28 ENCOUNTER — TELEPHONE (OUTPATIENT)
Facility: CLINIC | Age: 43
End: 2023-12-28

## 2023-12-28 ENCOUNTER — MED REC SCAN ONLY (OUTPATIENT)
Facility: CLINIC | Age: 43
End: 2023-12-28

## 2023-12-28 NOTE — TELEPHONE ENCOUNTER
12/27/23 rcvd via fax from [address] [place] [address]    Lab results labs in chart  Sent to [facility]

## 2023-12-28 NOTE — TELEPHONE ENCOUNTER
Called Patient 12/28 to offer a video visit per Dr. Tej Romero to Follow up from her appointment  that was missed on 12/22. I apologized to the patient and let her know maybe it would be easier to do video visit instead of in person. Patient declined and stated that she will be going to a new endocrinologist and that she will be leaving a bad review about our office and staff and that we were not empathic to her situation. I also let her know if we can do anything on her behave that we can  fax any paper work or labs and we can send it to wherever she needs them to go.

## 2023-12-28 NOTE — TELEPHONE ENCOUNTER
Patient was scheduled to see Dr. Lisy Curtis for 3:00 PM appointment on 12/22. She states she was running late but did not present within the 15 minute marisa period. When called at 3:22 PM, patient claimed she was in the elevator, however, 30 min had passed and pt was not here. I informed her that unfortunately the provider will not be able to see her, pt was very upset and yelled and curse at me. Pt was given the option to reschedule to a phone or video apt. Pt hung up.  12/28 Per Dr. Lisy Curtis, wanted MA to F/u with patient and offer a video visit to discuss next steps regarding her hx of thyroid cancer and recent ultrasound and thyroid blood work. She was offered a follow up for 1/2/2024, however, pt declined and stated she will be looking for a new endocrinologist.     Of note, patient presented past the 15 minute marisa period on her initial visit with Dr. Lisy Curtis, and she was seen at that time.

## 2023-12-28 NOTE — TELEPHONE ENCOUNTER
12/22/2023 Pt called saying she was going to be 5 min late, pt had past her 15 min marisa period, I called pt to see where she was pt claimed she was in the elevated 30 min had passed and pt was not here , called claimed she was still in the elevator and I informed her unfortunately the provider will not be able to see you, pt was very upset and yelled and curse at me pt was upset, pt was given the option to reschedule to a phone or video apt.  Pt hung up.  12/28 Per Dr. Tej Romero wanted MA to F/u with patient and offer a video visit for 1/2 pt declined and stated she will be looking for a new endocrinologist.

## 2023-12-29 NOTE — TELEPHONE ENCOUNTER
I was in communication with the MA regarding above. [facility] to [facility] (over usual marisa period of 15 minutes) and ultimately unable to see patient as office was closing and she still had not arrived. Offered her a reschedule visit but patient declined. Per my request, MA reached out this week to reschedule and offer virtual appointment as to accommodate patient since she has been late to all her appointments with endocrinology thus far. Patient declined to reschedule at this office. Informed patient that we are available to assist with transfer of care to her next provider and we remain available for any questions or follow up.      Mack Gould DO  Endocrinology, Diabetes & Metabolism   12/29/2023

## 2024-01-30 ENCOUNTER — TELEPHONE (OUTPATIENT)
Facility: CLINIC | Age: 44
End: 2024-01-30

## 2024-01-30 NOTE — TELEPHONE ENCOUNTER
The patient was called and we discussed at length the reason why the no show charge was applied to her account.  I explained that she was repeatedly advised that she can not be late to her appointment.  She was notified that she was late to her first appt. and the doctor agreed to see her anyway that day.  She was late again for a follow up appt. and ultimately did not make it to the office at all. I explained that all of this information was documented in her chart in detail.  She repeated again that she will not be returning to our office.  She threatened to give us bad reviews if I did not remove the charge. I told her we feel the no show charge is justified and I will discuss this further with my manager.

## 2024-01-30 NOTE — TELEPHONE ENCOUNTER
1/30/24 patient called in for a 2nd time    Inquiring about a $40 No Show Fee      Told patient I would forward message to Nancy Boecker; gave her my name as a contact

## 2024-01-31 ENCOUNTER — OFFICE VISIT (OUTPATIENT)
Facility: LOCATION | Age: 44
End: 2024-01-31
Payer: COMMERCIAL

## 2024-01-31 ENCOUNTER — TELEPHONE (OUTPATIENT)
Facility: LOCATION | Age: 44
End: 2024-01-31

## 2024-01-31 DIAGNOSIS — C73 PAPILLARY THYROID CARCINOMA (HCC): Primary | ICD-10-CM

## 2024-01-31 PROCEDURE — 99214 OFFICE O/P EST MOD 30 MIN: CPT | Performed by: OTOLARYNGOLOGY

## 2024-01-31 NOTE — TELEPHONE ENCOUNTER
Pt was seen today in office for a follow up on the thyroid. Pt presents with papillary thyroid carcinoma. Doctor requested office staff make an appointment for Pt to see Endocrinology with [facility]. Appointment has been set up for Pt next Thursday, February 08 at 2:40pm with Dr.Alsayed Abraham [place] [facility] [place]. Pt has been notified of appointment via phone call with a left voicemail and was sent a IMVUt message.

## 2024-01-31 NOTE — PROGRESS NOTES
Paula Hugo is a 43 year old female.   Chief Complaint   Patient presents with    Thyroid Problem     HPI:   She is postop thyroidectomy on October 26.  She was seen by endocrinology.  She has been titrated on thyroid hormone and is now euthyroid on 125 mcg.  She is still taking some calcium.  She complains of some pain at the left side of her neck which will come and go.    REVIEW OF SYSTEMS:   GENERAL HEALTH: feels well otherwise  GENERAL : denies fever, chills, sweats, weight loss, weight gain  SKIN: denies any unusual skin lesions or rashes  RESPIRATORY: denies shortness of breath with exertion  NEURO: denies headaches    EXAM:   LMP 09/25/2023     System Findings Details   Constitutional  Overall appearance - Normal.   Psychiatric  Orientation - Oriented to time, place, person & situation. Appropriate mood and affect.   Head/Face  Facial features -- Normal. Skull - Normal.   Eyes  Pupils equal ,round ,react to light and accomidate   Ears, Nose, Throat, Neck  Oropharynx is clear neck reveals a healing thyroid scar no evidence of infection or abscess.  I do not palpate any significant masses in the neck.   Neurological  Memory - Normal. Cranial nerves - Cranial nerves II through XII grossly intact.   Lymph Detail  Submental. Submandibular. Anterior cervical. Posterior cervical. Supraclavicular.       ASSESSMENT AND PLAN:   1. Papillary thyroid carcinoma (HCC)  Status post total thyroidectomy on October 26, 2023.  I reviewed an ultrasound from December.  This does show a few nonspecific areas in the neck the largest being 1.2 cm.  These are not concerning to me at this time.  It is very important that she follow-up with endocrinology for continued monitoring and consideration of radioactive iodine treatment.  We will attempt to get her into see the endocrinologist at [facility] in a timely fashion.      The patient indicates understanding of these issues and agrees to the plan.    No follow-ups on  file.    Justin Can MD  1/31/2024  12:17 PM

## 2024-02-08 ENCOUNTER — OFFICE VISIT (OUTPATIENT)
Facility: LOCATION | Age: 44
End: 2024-02-08
Payer: COMMERCIAL

## 2024-02-08 VITALS
BODY MASS INDEX: 32 KG/M2 | HEIGHT: 59 IN | SYSTOLIC BLOOD PRESSURE: 110 MMHG | OXYGEN SATURATION: 98 % | HEART RATE: 96 BPM | DIASTOLIC BLOOD PRESSURE: 60 MMHG

## 2024-02-08 DIAGNOSIS — C73 THYROID CANCER (HCC): Primary | ICD-10-CM

## 2024-02-08 DIAGNOSIS — E07.9 THYROID DISEASE: ICD-10-CM

## 2024-02-08 DIAGNOSIS — E66.9 DIABETES MELLITUS TYPE 2 IN OBESE  (HCC): ICD-10-CM

## 2024-02-08 DIAGNOSIS — E11.65 UNCONTROLLED TYPE 2 DIABETES MELLITUS WITH HYPERGLYCEMIA (HCC): ICD-10-CM

## 2024-02-08 DIAGNOSIS — E11.69 DIABETES MELLITUS TYPE 2 IN OBESE  (HCC): ICD-10-CM

## 2024-02-08 DIAGNOSIS — E89.0 HYPOTHYROIDISM, POSTSURGICAL: ICD-10-CM

## 2024-02-08 DIAGNOSIS — I10 PRIMARY HYPERTENSION: ICD-10-CM

## 2024-02-08 DIAGNOSIS — E78.5 DYSLIPIDEMIA: ICD-10-CM

## 2024-02-08 DIAGNOSIS — E89.0 S/P COMPLETE THYROIDECTOMY: ICD-10-CM

## 2024-02-08 DIAGNOSIS — E53.8 LOW SERUM VITAMIN B12: ICD-10-CM

## 2024-02-08 PROCEDURE — 99205 OFFICE O/P NEW HI 60 MIN: CPT | Performed by: INTERNAL MEDICINE

## 2024-02-08 RX ORDER — LEVOTHYROXINE SODIUM 137 MCG
137 TABLET ORAL
Qty: 90 TABLET | Refills: 0 | Status: SHIPPED | OUTPATIENT
Start: 2024-02-08 | End: 2024-05-08

## 2024-02-08 RX ORDER — SEMAGLUTIDE 0.68 MG/ML
0.5 INJECTION, SOLUTION SUBCUTANEOUS WEEKLY
Qty: 9 ML | Refills: 1 | Status: SHIPPED | OUTPATIENT
Start: 2024-02-08

## 2024-02-08 NOTE — PROGRESS NOTES
New Patient Evaluation - History and Physical    CONSULT - Reason for Visit:  thyroid cancer, hypothyroid, DM, obese, DLP, statin intolerance, .  Requesting Physician:   .WALESKA MALHOTRA      CHIEF COMPLAINT:    Chief Complaint   Patient presents with    Consult     Thyroid   Weight gain         HISTORY OF PRESENT ILLNESS:   Paula Hugo is a 43 year old female who presents with thyroid cancer, PTC stage I, hypothyroid, DM, obese, DLP, statin intolerance and low vit D    Was seen by Dr Odalys Hoffman, DO  before    She had hypothyroid before her surgery   Levothyroxine started with 50 mcg was feeling bad on it - labs showed TSH > 100   Now on Synthroid 125 mcg and she feels better 4/10. Has been on on this dose for ~ 2 mo   Takes it on empty stomach- Last meal 8 pm, gets up at 5 AM, emoty stomach. Stays up 3-4 hrs then eats       Thyroid cancer history   9/11/2023 Right mid thyroid nodule, FNA - PTC ([facility])  10/26/2023 Bilateral Total Thyroidectomy  by Justin Can MD      12/2023 US   . There is soft tissue mass in area of right thyroidectomy which has suspicious imaging appearance on ultrasound and should be further evaluated with CT or I 131 imaging study.   . Findings in left neck in thyroid region are probably granulation tissue but nonspecific.   . Mildly enlarged left submandibular and node is noted.  This does maintain normal lymph node architecture although should be correlated clinically.     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  -Tumor is confined to the thyroid parenchyma.  -Margins of resection are free of tumor.  -Single right perithyroidal lymph node, negative for metastatic carcinoma.           SPECIMEN   Procedure  Total thyroidectomy   TUMOR   Tumor Focality  Unifocal   Tumor  Characteristics     Tumor Site  Right lobe   Tumor Size  Greatest Dimension (Centimeters): 2.5 cm   Histologic Tumor Types and Subtypes  Papillary thyroid carcinoma   Tumor Proliferative Activity     Mitotic Rate  Less than 3 mitoses per 2mm2   Tumor Necrosis  Not identified   Angioinvasion (vascular invasion)  Not identified   Lymphatic Invasion  Not identified   Perineural Invasion  Not identified   Extrathyroidal Extension  Not identified   Margin Status  All margins negative for carcinoma   Distance from Invasive Carcinoma to Closest Margin  Less than 1 mm   REGIONAL LYMPH NODES   Regional Lymph Node Status  All regional lymph nodes negative for tumor   Number of Lymph Nodes Examined  1   Matt Level(s) Examined  Level VI   pTNM CLASSIFICATION (AJCC 8th Edition)   Reporting of pT, pN, and (when applicable) pM categories is based on information available to the pathologist at the time the report is issued. As per the AJCC (Chapter 1, 8th Ed.) it is the managing physician’s responsibility to establish the final pathologic stage based upon all pertinent information, including but potentially not limited to this pathology report.        pT Category  pT2   pN Category  pN0a   ADDITIONAL FINDINGS   Additional Findings  None identified     For DM  On Metformin and  farxiga  Stopped Rybelsus 9/2023  Last A1c 6.9    Lab Results   Component Value Date    A1C 7.9 (H) 10/28/2023    A1C 7.5 (H) 12/17/2019        Htn on Losrtan     Dlp On zetia   She did not tolerate statin- forgetfulness     Hypocalcemia, but had tingling even before surgery   Was on tums /calcitriol til 2 weeks ago   Still has tingling but not as bad   Recent labs in Jan 2024 calcium levels normal     Low vit D3   Low b12     ASSESSMENT AND PLAN:  42 yo woman with PTC stage I T2 N0 Mx, hypothyroid, DM, obese, DLP, statin intolerance and low vit D    10/2023 pathology showed 2.5 cmPTC Rt side, no ETE/vascular/lymphatic/perineural invasion, negative margins,  0/1 LN VI,      Discussed with pt in length her recurrence risk per PRAKASH is ~ 5%   She does not need CURRY ablation now and she understands risk/benefit ratio. She needs TSH suppression and continue to follow up with Tg and US   She had an US in December post op month #2 when her TSH was > 100 which showed possible Rt thyroid bed residual.   She understands the need for monitoring and close f/u     Plan      Labs now   Fasting AM labs and f/u in 8 weeks   US before next visit     Thyroid medication dose Synthroid 137 mcg daily - ok to take extra one tab a week of 125 till you finish the ones you have at home  Take you medication on empty stomach, not with any other medication or food. Wait 60 minutes before eating. Wait 4 hours before taking Vitamins, Calcium or iron.  Wait 60 minutes before eating. Do not take the medication on the morning of the lab test. Do not take Biotin/Turmeric 1 week before the test.    During pregnancy, we need to increase thyroid medication dose (take double your usual dose on 2 days a week, the rest of the days take the usual dose) and CALL US TO SCHEDULE AN APPT TO DO LABS AND ADJUST THE DOSE.     This is a very helpful website  [email or web address]    Will start ozempic low doses  0.25mg weekly     Start b12 daily     Take metformin , fasxiga, zetia, and losartan     Not on januvia, GLP-1a, statin,  now     Will discuss more GLP-1 , statin, generic levothyroxine next visit         PAST MEDICAL HISTORY:   Past Medical History:   Diagnosis Date    Diabetes (HCC)     Disorder of thyroid     High blood pressure     High cholesterol     History of COVID-19     Had flu like S/S x 2-3 days.    Neuropathy     feet    PONV (postoperative nausea and vomiting)     Visual impairment     glasses       PAST SURGICAL HISTORY:   Past Surgical History:   Procedure Laterality Date      2002      06/15/2005      2007       10/17/2013   10/2023 thyroidectomy      CURRENT MEDICATIONS:    Current Outpatient Medications   Medication Sig Dispense Refill    SYNTHROID 137 MCG Oral Tab Take 137 mcg by mouth before breakfast. 90 tablet 0    semaglutide (OZEMPIC, 0.25 OR 0.5 MG/DOSE,) 2 MG/3ML Subcutaneous Solution Pen-injector Inject 0.5 mg into the skin once a week. 9 mL 1    cyanocobalamin 250 MCG Oral Tab Take 1 tablet (250 mcg total) by mouth daily. 90 tablet 1    FARXIGA 10 MG Oral Tab Take 1 tablet (10 mg total) by mouth. Can hold until diet improves      metFORMIN  MG Oral Tablet 24 Hr Take 1 tablet (500 mg total) by mouth 2 (two) times daily with meals. Can hold until diet improves      losartan 25 MG Oral Tab Take 1 tablet (25 mg total) by mouth at bedtime.      ezetimibe 10 MG Oral Tab Take 1 tablet (10 mg total) by mouth nightly.      ergocalciferol 1.25 MG (38571 UT) Oral Cap Take 1 capsule (50,000 Units total) by mouth twice a week.      calcitriol 0.25 MCG Oral Cap Take 1 capsule (0.25 mcg total) by mouth daily. (Patient not taking: Reported on 2/8/2024) 90 capsule 2    calcium carbonate 500 MG Oral Chew Tab Chew 3 tablets (1,500 mg total) by mouth 3 (three) times daily with meals. (Patient not taking: Reported on 2/8/2024) 180 tablet 2    oxyCODONE 5 MG Oral Tab Take 1 tablet (5 mg total) by mouth every 4 (four) hours as needed. 20 tablet 0     No medication comments found.   SYNTHROID 137 MCG Oral Tab Take 137 mcg by mouth before breakfast. 90 tablet 0    semaglutide (OZEMPIC, 0.25 OR 0.5 MG/DOSE,) 2 MG/3ML Subcutaneous Solution Pen-injector Inject 0.5 mg into the skin once a week. 9 mL 1    cyanocobalamin 250 MCG Oral Tab Take 1 tablet (250 mcg total) by mouth daily. 90 tablet 1    FARXIGA 10 MG Oral Tab Take 1 tablet (10 mg total) by mouth. Can hold until diet improves      metFORMIN  MG Oral Tablet 24 Hr Take 1 tablet (500 mg total) by mouth 2 (two) times daily with meals. Can hold until diet improves       losartan 25 MG Oral Tab Take 1 tablet (25 mg total) by mouth at bedtime.      ezetimibe 10 MG Oral Tab Take 1 tablet (10 mg total) by mouth nightly.          ALLERGIES:  Allergies   Allergen Reactions    Penicillins HIVES       SOCIAL HISTORY:    Social History     Socioeconomic History    Marital status:    Tobacco Use    Smoking status: Never    Smokeless tobacco: Never   Vaping Use    Vaping Use: Never used   Substance and Sexual Activity    Alcohol use: Not Currently    Drug use: Never   Other Topics Concern    Caffeine Concern Yes     Comment: 1-2 cups a day    Special Diet Yes     Comment: 2-3 x week       FAMILY HISTORY:   Family History   Problem Relation Age of Onset    Diabetes Neg           REVIEW OF SYSTEMS:  All negative other than HPI      PHYSICAL EXAM:   Height: 4' 11\" (149.9 cm) (02/08 1428)  Weight: --  BSA (Calculated - sq m): --  Pulse: 96 (02/08 1428)  BP: 110/60 (02/08 1428)  Temp: --  Do Not Use - Resp Rate: --  SpO2: 98 % (02/08 1428)    Body mass index is 31.91 kg/m².    Obese   Well healed wound at the base of the neck   No LAP on exam   No abd tenderness     CONSTITUTIONAL:  Awake and alert. Age appropriate, good hygiene not in acute distress. Well nourished and well developed. no acute distress   PSYCH:   Orientated to time, place, person & situation, Normal mood and affect, memory intact, normal insight and judgment, cooperative  Neuro: speech is clear. Awake, alert, no aphasia, no facial asymmetry, no nuchal rigidity  EYES:  No proptosis, no ptosis, conjunctiva normal  ENT:  Normocephalic, atraumatic  Eye: EOMI, normal lids, no discharge, no conjunctival erythema. No exophthalmos/proptosis, Ptosis negative   No rhinorrhea, moist oral mucosa  Neck: full range of motion  Neck/Thyroid: neck inspection:  + scar,    LUNGS:  No acute respiratory distress, non-labored respiration. Speaking full sentences  CARDIOVASCULAR:  regular rate   ABDOMEN:  No abdominal pain.   SKIN:  no bruising  or bleeding, no rashes and no lesions, Skin is dry, no obvious rashes or lesions  EXTREMITIES: no gross abnormality   MSK: Moves extremities spontaneously. full range of motion in all major joints    DATA:   Pertinent data reviewed       Vitamin D, 25-Hydroxy, Total  30.0 - 100.0 ng/mL 35.2     12/26/23 10:30 AM    TSH  0.30 - 5.33 µIU/mL 116.98     Ref Range & Units 1/11/24 10:29 AM   TSH  0.270 - 4.200 mIU/L 3.150     1/16/24  9:31 AM    TSH  0.30 - 5.33 µIU/mL 1.39            No results for input(s): \"TSH\", \"T4F\", \"T3F\", \"THYP\" in the last 72 hours.  No results found.        Orders Placed This Encounter   Procedures    Thyroglobulin Antibody and Tumor Marker    Comp Metabolic Panel [E]    TSH and Free T4    TSH and Free T4    Thyroglobulin Antibody and Tumor Marker    Comp Metabolic Panel [E]    Hemoglobin A1C [E]    Lipid Panel [E]    Microalb/Creat Ratio, Random Urine [E]    TSH and Free T4     Orders Placed This Encounter    Thyroglobulin Antibody and Tumor Marker     Standing Status:   Future     Standing Expiration Date:   2/8/2025     Order Specific Question:   Release to patient     Answer:   Immediate    Comp Metabolic Panel [E]     Standing Status:   Future     Standing Expiration Date:   2/8/2025     Order Specific Question:   Release to patient     Answer:   Immediate    TSH and Free T4     Order Specific Question:   Release to patient     Answer:   Immediate    TSH and Free T4     Standing Status:   Future     Number of Occurrences:   1     Standing Expiration Date:   2/8/2025     Order Specific Question:   Release to patient     Answer:   Immediate    Thyroglobulin Antibody and Tumor Marker     Standing Status:   Future     Standing Expiration Date:   2/8/2025     Order Specific Question:   Release to patient     Answer:   Immediate    Comp Metabolic Panel [E]     Standing Status:   Future     Standing Expiration Date:   2/8/2025     Order Specific Question:   Release to patient     Answer:    Immediate    Hemoglobin A1C [E]     Standing Status:   Future     Standing Expiration Date:   2/8/2025     Order Specific Question:   Release to patient     Answer:   Immediate    Lipid Panel [E]     Standing Status:   Future     Standing Expiration Date:   2/8/2025     Order Specific Question:   Release to patient     Answer:   Immediate    Microalb/Creat Ratio, Random Urine [E]     Standing Status:   Future     Standing Expiration Date:   2/8/2025     Order Specific Question:   Release to patient     Answer:   Immediate    TSH and Free T4     Order Specific Question:   Release to patient     Answer:   Immediate    SYNTHROID 137 MCG Oral Tab     Sig: Take 137 mcg by mouth before breakfast.     Dispense:  90 tablet     Refill:  0    semaglutide (OZEMPIC, 0.25 OR 0.5 MG/DOSE,) 2 MG/3ML Subcutaneous Solution Pen-injector     Sig: Inject 0.5 mg into the skin once a week.     Dispense:  9 mL     Refill:  1    cyanocobalamin 250 MCG Oral Tab     Sig: Take 1 tablet (250 mcg total) by mouth daily.     Dispense:  90 tablet     Refill:  1          This is a specialized patient consultation in endocrinology and required comprehensive review of prior records, as well as current evaluation, with time required for consideration of complex endocrine issues and consultation. For this visit, I personally interviewed the patient, and family member if accompanied, performed the pertinent parts of the history and physical examination. ROS included screening for appropriate endocrine conditions.   Today's diagnosis and plan were reviewed in detail with the patient who states understanding and agrees with plan. I discussed with the patient possible diagnosis, differential diagnosis, need for work up , treatment options, alternatives and side effects.     Please see note for details about time spent which includes:   · pre-visit preparation  · reviewing records  · face to face time with the patient   · timely documentation of the  encounter  · ordering medications/tests  · communication with care team  · care coordination    I appreciate the opportunity to be part of your patient's medical care and will keep you, as the referring and primary physicians, informed about the care of your patient, including possible future surgery and pathology findings. Please feel free to contact me should you have any questions.      Leigh Taylor MD

## 2024-02-08 NOTE — PATIENT INSTRUCTIONS
Labs now   Fasting AM labs and f/u in 8 weeks   US before next visit     Thyroid medication dose Synthroid 137 mcg daily - ok to take extra one tab a week of 125 till you finish the ones you have at home  Take you medication on empty stomach, not with any other medication or food. Wait 60 minutes before eating. Wait 4 hours before taking Vitamins, Calcium or iron.  Wait 60 minutes before eating. Do not take the medication on the morning of the lab test. Do not take Biotin/Turmeric 1 week before the test.    During pregnancy, we need to increase thyroid medication dose (take double your usual dose on 2 days a week, the rest of the days take the usual dose) and CALL US TO SCHEDULE AN APPT TO DO LABS AND ADJUST THE DOSE.     This is a very helpful website     [email or web address]    Will start ozempic low doses  0.25mg weekly     Start b12 daily     Take metformin , fasxiga, zetia, and losartan     Not on januvia, GLP-1a, statin,  now     Will discuss more GLP-1 , statin, generic levothyroxine next visit

## 2024-02-20 ENCOUNTER — LAB ENCOUNTER (OUTPATIENT)
Dept: LAB | Age: 44
End: 2024-02-20
Attending: INTERNAL MEDICINE
Payer: COMMERCIAL

## 2024-02-20 DIAGNOSIS — E11.69 DIABETES MELLITUS TYPE 2 IN OBESE (HCC): ICD-10-CM

## 2024-02-20 DIAGNOSIS — C73 THYROID CANCER (HCC): ICD-10-CM

## 2024-02-20 DIAGNOSIS — I10 PRIMARY HYPERTENSION: ICD-10-CM

## 2024-02-20 DIAGNOSIS — E89.0 HYPOTHYROIDISM, POSTSURGICAL: ICD-10-CM

## 2024-02-20 DIAGNOSIS — E07.9 THYROID DISEASE: ICD-10-CM

## 2024-02-20 DIAGNOSIS — E66.9 DIABETES MELLITUS TYPE 2 IN OBESE (HCC): ICD-10-CM

## 2024-02-20 DIAGNOSIS — E78.5 DYSLIPIDEMIA: ICD-10-CM

## 2024-02-20 DIAGNOSIS — E89.0 S/P COMPLETE THYROIDECTOMY: ICD-10-CM

## 2024-02-20 DIAGNOSIS — E11.65 UNCONTROLLED TYPE 2 DIABETES MELLITUS WITH HYPERGLYCEMIA (HCC): ICD-10-CM

## 2024-02-20 LAB
ALBUMIN SERPL-MCNC: 4.4 G/DL (ref 3.4–5)
ALBUMIN/GLOB SERPL: 1.3 {RATIO} (ref 1–2)
ALP LIVER SERPL-CCNC: 91 U/L
ALT SERPL-CCNC: 125 U/L
ANION GAP SERPL CALC-SCNC: 7 MMOL/L (ref 0–18)
AST SERPL-CCNC: 64 U/L (ref 15–37)
BILIRUB SERPL-MCNC: 0.6 MG/DL (ref 0.1–2)
BUN BLD-MCNC: 13 MG/DL (ref 9–23)
CALCIUM BLD-MCNC: 9.6 MG/DL (ref 8.5–10.1)
CHLORIDE SERPL-SCNC: 106 MMOL/L (ref 98–112)
CO2 SERPL-SCNC: 26 MMOL/L (ref 21–32)
CREAT BLD-MCNC: 0.39 MG/DL
EGFRCR SERPLBLD CKD-EPI 2021: 127 ML/MIN/1.73M2 (ref 60–?)
FASTING STATUS PATIENT QL REPORTED: NO
GLOBULIN PLAS-MCNC: 3.5 G/DL (ref 2.8–4.4)
GLUCOSE BLD-MCNC: 107 MG/DL (ref 70–99)
OSMOLALITY SERPL CALC.SUM OF ELEC: 289 MOSM/KG (ref 275–295)
POTASSIUM SERPL-SCNC: 3.9 MMOL/L (ref 3.5–5.1)
PROT SERPL-MCNC: 7.9 G/DL (ref 6.4–8.2)
SODIUM SERPL-SCNC: 139 MMOL/L (ref 136–145)
T4 FREE SERPL-MCNC: 1.7 NG/DL (ref 0.8–1.7)
TSI SER-ACNC: 0.06 MIU/ML (ref 0.36–3.74)

## 2024-02-20 PROCEDURE — 86800 THYROGLOBULIN ANTIBODY: CPT

## 2024-02-20 PROCEDURE — 84443 ASSAY THYROID STIM HORMONE: CPT | Performed by: INTERNAL MEDICINE

## 2024-02-20 PROCEDURE — 36415 COLL VENOUS BLD VENIPUNCTURE: CPT

## 2024-02-20 PROCEDURE — 80053 COMPREHEN METABOLIC PANEL: CPT

## 2024-02-20 PROCEDURE — 84439 ASSAY OF FREE THYROXINE: CPT | Performed by: INTERNAL MEDICINE

## 2024-02-22 LAB
THYROGLOB AB: <1 IU/ML
THYROGLOB IMA: 8.9 NG/ML

## 2024-03-28 ENCOUNTER — HOSPITAL ENCOUNTER (OUTPATIENT)
Dept: ULTRASOUND IMAGING | Age: 44
Discharge: HOME | End: 2024-03-28
Attending: INTERNAL MEDICINE
Payer: COMMERCIAL

## 2024-03-28 DIAGNOSIS — C73 THYROID CANCER (HCC): ICD-10-CM

## 2024-03-28 PROCEDURE — 76536 US EXAM OF HEAD AND NECK: CPT | Performed by: INTERNAL MEDICINE

## 2024-04-02 ENCOUNTER — OFFICE VISIT (OUTPATIENT)
Facility: LOCATION | Age: 44
End: 2024-04-02

## 2024-04-02 ENCOUNTER — PATIENT MESSAGE (OUTPATIENT)
Facility: LOCATION | Age: 44
End: 2024-04-02

## 2024-04-02 ENCOUNTER — LAB ENCOUNTER (OUTPATIENT)
Dept: LAB | Age: 44
End: 2024-04-02
Attending: INTERNAL MEDICINE
Payer: COMMERCIAL

## 2024-04-02 VITALS
BODY MASS INDEX: 30.64 KG/M2 | DIASTOLIC BLOOD PRESSURE: 70 MMHG | SYSTOLIC BLOOD PRESSURE: 100 MMHG | WEIGHT: 152 LBS | HEART RATE: 103 BPM | OXYGEN SATURATION: 96 % | HEIGHT: 59 IN

## 2024-04-02 DIAGNOSIS — I10 PRIMARY HYPERTENSION: ICD-10-CM

## 2024-04-02 DIAGNOSIS — E53.8 LOW SERUM VITAMIN B12: ICD-10-CM

## 2024-04-02 DIAGNOSIS — E07.9 THYROID DISEASE: ICD-10-CM

## 2024-04-02 DIAGNOSIS — E89.0 S/P COMPLETE THYROIDECTOMY: ICD-10-CM

## 2024-04-02 DIAGNOSIS — E78.5 DYSLIPIDEMIA: ICD-10-CM

## 2024-04-02 DIAGNOSIS — E89.0 HYPOTHYROIDISM, POSTSURGICAL: Primary | ICD-10-CM

## 2024-04-02 DIAGNOSIS — E11.65 UNCONTROLLED TYPE 2 DIABETES MELLITUS WITH HYPERGLYCEMIA (HCC): ICD-10-CM

## 2024-04-02 DIAGNOSIS — C73 THYROID CANCER (HCC): ICD-10-CM

## 2024-04-02 DIAGNOSIS — E89.0 HYPOTHYROIDISM, POSTSURGICAL: ICD-10-CM

## 2024-04-02 LAB — TSI SER-ACNC: 0.84 MIU/ML (ref 0.55–4.78)

## 2024-04-02 PROCEDURE — 99215 OFFICE O/P EST HI 40 MIN: CPT | Performed by: INTERNAL MEDICINE

## 2024-04-02 PROCEDURE — 86800 THYROGLOBULIN ANTIBODY: CPT

## 2024-04-02 PROCEDURE — 84443 ASSAY THYROID STIM HORMONE: CPT

## 2024-04-02 PROCEDURE — 36415 COLL VENOUS BLD VENIPUNCTURE: CPT

## 2024-04-02 RX ORDER — LEVOTHYROXINE SODIUM 0.12 MG/1
125 TABLET ORAL
Qty: 90 TABLET | Refills: 1 | Status: SHIPPED | OUTPATIENT
Start: 2024-04-02 | End: 2024-04-02

## 2024-04-02 RX ORDER — SEMAGLUTIDE 1.34 MG/ML
1 INJECTION, SOLUTION SUBCUTANEOUS WEEKLY
Qty: 9 ML | Refills: 1 | Status: SHIPPED | OUTPATIENT
Start: 2024-04-02

## 2024-04-02 RX ORDER — LEVOTHYROXINE SODIUM 0.12 MG/1
125 TABLET ORAL
Qty: 90 TABLET | Refills: 1 | Status: SHIPPED | OUTPATIENT
Start: 2024-04-02 | End: 2024-09-29

## 2024-04-02 NOTE — PROGRESS NOTES
New Patient Evaluation - History and Physical    CONSULT - Reason for Visit:  thyroid cancer, hypothyroid, DM, obese, DLP, statin intolerance, .  Requesting Physician:   ..HARSH MALHOTRA      CHIEF COMPLAINT:    Chief Complaint   Patient presents with    Follow - Up     Thyroid cancer f/u        HISTORY OF PRESENT ILLNESS:   Paula Hugo is a 43 year old female who presents with thyroid cancer, PTC stage I, hypothyroid, DM, obese, DLP, statin intolerance and low vit D      She had hyperthyroid sx from LT4 137 mcg daily  Was getting palpitations and insomnia   She did labs 2/2024 and US in 3/2024   She changed to LT4 125 mcg daily now.       2/2024 TSH 0.059  FT41.7  Tg 8.9 TgAb <1.0    Stopped calcium and calcitriol - Ca 9.6 on recent labs        Thyroid cancer history   9/11/2023 Right mid thyroid nodule, FNA - PTC ([place] [facility])  10/26/2023 Bilateral Total Thyroidectomy  by Justin Can MD      12/2023 US   . There is soft tissue mass in area of right thyroidectomy which has suspicious imaging appearance on ultrasound and should be further evaluated with CT or I 131 imaging study.   . Findings in left neck in thyroid region are probably granulation tissue but nonspecific.   . Mildly enlarged left submandibular and node is noted.  This does maintain normal lymph node architecture although should be correlated clinically.     12/8/2023 TSH >100, Tg 143, neg TgAb  12/26/023   on 50 mcg Synthroid  1/11/2024 TSH 3.1  1/16/2024 TSH 1.39 125 mcg Synthroid    12/2023 US post op month #2 when her TSH was > 100 which showed possible Rt thyroid bed residual.   2/2024 TSH 0.059  FT41.7  Tg 8.9 TgAb <1.0  3/28/2024 US right thyroid bed is a hypoechoic, 0.9 x 0.9 x 1.6 versus 1 x 1.1 x 1.7 cm nodule.  Within the left thyroid bed best seen on trans imaging is a stable 4 x 5 versus 4 x 6 mm echogenic area.       Final Diagnosis:   Thyroid gland:  -Papillary thyroid carcinoma, right mid to lower lobe (2.5 cm in  greatest dimension) with calcification.  -Tumor is confined to the thyroid parenchyma.  -Margins of resection are free of tumor.  -Single right perithyroidal lymph node, negative for metastatic carcinoma.           SPECIMEN   Procedure  Total thyroidectomy   TUMOR   Tumor Focality  Unifocal   Tumor Characteristics     Tumor Site  Right lobe   Tumor Size  Greatest Dimension (Centimeters): 2.5 cm   Histologic Tumor Types and Subtypes  Papillary thyroid carcinoma   Tumor Proliferative Activity     Mitotic Rate  Less than 3 mitoses per 2mm2   Tumor Necrosis  Not identified   Angioinvasion (vascular invasion)  Not identified   Lymphatic Invasion  Not identified   Perineural Invasion  Not identified   Extrathyroidal Extension  Not identified   Margin Status  All margins negative for carcinoma   Distance from Invasive Carcinoma to Closest Margin  Less than 1 mm   REGIONAL LYMPH NODES   Regional Lymph Node Status  All regional lymph nodes negative for tumor   Number of Lymph Nodes Examined  1   Matt Level(s) Examined  Level VI   pTNM CLASSIFICATION (AJCC 8th Edition)   Reporting of pT, pN, and (when applicable) pM categories is based on information available to the pathologist at the time the report is issued. As per the AJCC (Chapter 1, 8th Ed.) it is the managing physician’s responsibility to establish the final pathologic stage based upon all pertinent information, including but potentially not limited to this pathology report.        pT Category  pT2   pN Category  pN0a   ADDITIONAL FINDINGS   Additional Findings  None identified     DM   On metfomrmin, farxiga and ozempic 1 mg/week   Last A1c value was 7.9% done 10/28/2023.    Lab Results   Component Value Date    A1C 7.9 (H) 10/28/2023    A1C 7.5 (H) 12/17/2019        HTN on Losrtan     DLP On zetia   She did not tolerate statin- forgetfulness. Does not want to try them again.     Hypocalcemia, stopped calcium and calcitriol.     Low vit D3   Low b12          ASSESSMENT AND PLAN:  44 yo woman with PTC stage I T2 N0 Mx, post op hypothyroid, DM, obese, DLP, statin intolerance, low b12 and low vit D    10/2023 pathology showed 2.5 cmPTC Rt side, no ETE/vascular/lymphatic/perineural invasion, negative margins, 0/1 LN VI,      Her initial recurrence risk per PRAKASH was ~ 5% based on pathology alone. Her post op w/u showed high Tg levels ( stimulated 143 and non-stimulated 8.9)  3/2024 US thyroid also showed some possible BL thyroid bed residual.    I d/w pt and she will benefit from CURRY ablation and post treatment scan. She understands risk/benefit ratio. She agreed with the plan.   She needs TSH suppression but w/o causing hyperthyroid sx   Will continue LT4 125 mcg daily  Follow up with Tg in ~6/2024 and US 9/2024  She understands the need for monitoring and close f/u     Plan  Will refer to nuclear medicine for radioactive iodine ablation with thyrogen -Thyroid cancer s/p surgery Tg is high and abnormal US findings in thyroid bed    Labs today   Labs and f/u in 2 mo     Low iodine diet diet for 10 -14 days before the scan - [email or web address]  Discussed radioactive iodine precautions     Thyroid medication dose: Synthroid 125 mcg . If still feeing jittery, take 1/2 tab on Sundays  Take you medication on empty stomach, not with any other medication or food. Wait 60 minutes before eating. Wait 4 hours before taking Vitamins, Calcium or iron. On the morning of the lab test, please take the medication after the blood test not before. Do not take Biotin 1 week before blood test.      Helpful websites   [email or web address]  [email or web address]      Did not tolerate atorvastatin and does not want to try them again  Increase ozempic 1 mg weekly   take b12 and vit D daily - OTC      Take metformin , fasxiga, zetia, and losartan       PAST MEDICAL HISTORY:   Past Medical History:   Diagnosis Date    Diabetes (HCC)      Disorder of thyroid     High blood pressure     High cholesterol     History of COVID-19     Had flu like S/S x 2-3 days.    Neuropathy     feet    PONV (postoperative nausea and vomiting)     Visual impairment     glasses       PAST SURGICAL HISTORY:   Past Surgical History:   Procedure Laterality Date      2002      06/15/2005      2007      10/17/2013   10/2023 thyroidectomy      CURRENT MEDICATIONS:    Current Outpatient Medications   Medication Sig Dispense Refill    semaglutide (OZEMPIC, 1 MG/DOSE,) 4 MG/3ML Subcutaneous Solution Pen-injector Inject 1 mg into the skin once a week. 9 mL 1    levothyroxine 125 MCG Oral Tab Take 1 tablet (125 mcg total) by mouth before breakfast. Synthroid brand only 90 tablet 1    cyanocobalamin 250 MCG Oral Tab Take 1 tablet (250 mcg total) by mouth daily. 90 tablet 1    FARXIGA 10 MG Oral Tab Take 1 tablet (10 mg total) by mouth. Can hold until diet improves      metFORMIN  MG Oral Tablet 24 Hr Take 1 tablet (500 mg total) by mouth 2 (two) times daily with meals. Can hold until diet improves      losartan 25 MG Oral Tab Take 1 tablet (25 mg total) by mouth at bedtime.      ezetimibe 10 MG Oral Tab Take 1 tablet (10 mg total) by mouth nightly.      ergocalciferol 1.25 MG (54560 UT) Oral Cap Take 1 capsule (50,000 Units total) by mouth twice a week.      oxyCODONE 5 MG Oral Tab Take 1 tablet (5 mg total) by mouth every 4 (four) hours as needed. 20 tablet 0     No medication comments found.   semaglutide (OZEMPIC, 1 MG/DOSE,) 4 MG/3ML Subcutaneous Solution Pen-injector Inject 1 mg into the skin once a week. 9 mL 1    levothyroxine 125 MCG Oral Tab Take 1 tablet (125 mcg total) by mouth before breakfast. Synthroid brand only 90 tablet 1    cyanocobalamin 250 MCG Oral Tab Take 1 tablet (250 mcg total) by mouth daily. 90 tablet 1    FARXIGA 10 MG Oral Tab Take 1 tablet (10 mg total) by mouth. Can hold until diet improves       metFORMIN  MG Oral Tablet 24 Hr Take 1 tablet (500 mg total) by mouth 2 (two) times daily with meals. Can hold until diet improves      losartan 25 MG Oral Tab Take 1 tablet (25 mg total) by mouth at bedtime.      ezetimibe 10 MG Oral Tab Take 1 tablet (10 mg total) by mouth nightly.          ALLERGIES:  Allergies   Allergen Reactions    Penicillins HIVES       SOCIAL HISTORY:    Social History     Socioeconomic History    Marital status:    Tobacco Use    Smoking status: Never    Smokeless tobacco: Never   Vaping Use    Vaping Use: Never used   Substance and Sexual Activity    Alcohol use: Not Currently    Drug use: Never   Other Topics Concern    Caffeine Concern Yes     Comment: 1-2 cups a day    Special Diet Yes     Comment: 2-3 x week       FAMILY HISTORY:   Family History   Problem Relation Age of Onset    Diabetes Neg              PHYSICAL EXAM:   Height: 4' 11\" (149.9 cm) (04/02 1049)  Weight: 152 lb (68.9 kg) (04/02 1049)  BSA (Calculated - sq m): 1.64 sq meters (04/02 1049)  Pulse: 103 (04/02 1049)  BP: 100/70 (04/02 1049)  Temp: --  Do Not Use - Resp Rate: --  SpO2: 96 % (04/02 1049)    Body mass index is 30.7 kg/m².    Obese   Well healed wound at the base of the neck   No LAP on exam      CONSTITUTIONAL:  Awake and alert. Age appropriate, good hygiene not in acute distress. Well nourished and well developed. no acute distress   PSYCH:   Orientated to time, place, person & situation, Normal mood and affect, memory intact, normal insight and judgment, cooperative  Neuro: speech is clear. Awake, alert, no aphasia, no facial asymmetry, no nuchal rigidity  EYES:  No proptosis, no ptosis, conjunctiva normal  ENT:  Normocephalic, atraumatic  Eye: EOMI, normal lids, no discharge, no conjunctival erythema. No exophthalmos/proptosis, Ptosis negative   No rhinorrhea, moist oral mucosa  Neck: full range of motion  Neck/Thyroid: neck inspection:  + scar,    LUNGS:  No acute respiratory distress,  non-labored respiration. Speaking full sentences  CARDIOVASCULAR:  regular rate   ABDOMEN:  No abdominal pain.   SKIN:  no bruising or bleeding, no rashes and no lesions, Skin is dry, no obvious rashes or lesions  EXTREMITIES: no gross abnormality   MSK: Moves extremities spontaneously. full range of motion in all major joints    DATA:   Pertinent data reviewed      03/28/24 15:32   US HEAD/NECK (CPT=76536) Rpt   Rpt: View report in Results Review for more information      Latest Reference Range & Units 12/08/23 12:40 02/20/24 10:46   Thyroglob Ab 0.0 - 0.9 IU/mL <1.0 <1.0   Thyroglob LIA 1.5 - 38.5 ng/mL 143.4 (H) 8.9   (H): Data is abnormally high      No results for input(s): \"TSH\", \"T4F\", \"T3F\", \"THYP\" in the last 72 hours.  No results found.        Orders Placed This Encounter   Procedures    TSH W Reflex To Free T4    Thyroglobulin Antibody and Tumor Marker    TSH W Reflex To Free T4    Thyroglobulin Antibody and Tumor Marker     Orders Placed This Encounter    TSH W Reflex To Free T4     Standing Status:   Future     Standing Expiration Date:   4/2/2025     Order Specific Question:   Release to patient     Answer:   Immediate    Thyroglobulin Antibody and Tumor Marker     Standing Status:   Future     Standing Expiration Date:   4/2/2025     Order Specific Question:   Release to patient     Answer:   Immediate    TSH W Reflex To Free T4     Standing Status:   Future     Standing Expiration Date:   4/2/2025     Order Specific Question:   Release to patient     Answer:   Immediate    Thyroglobulin Antibody and Tumor Marker     Standing Status:   Future     Standing Expiration Date:   4/2/2025     Order Specific Question:   Release to patient     Answer:   Immediate    DISCONTD: levothyroxine 125 MCG Oral Tab     Sig: Take 1 tablet (125 mcg total) by mouth before breakfast.     Dispense:  90 tablet     Refill:  1    semaglutide (OZEMPIC, 1 MG/DOSE,) 4 MG/3ML Subcutaneous Solution Pen-injector     Sig: Inject 1  mg into the skin once a week.     Dispense:  9 mL     Refill:  1    levothyroxine 125 MCG Oral Tab     Sig: Take 1 tablet (125 mcg total) by mouth before breakfast. Synthroid brand only     Dispense:  90 tablet     Refill:  1          This is a specialized patient consultation in endocrinology and required comprehensive review of prior records, as well as current evaluation, with time required for consideration of complex endocrine issues and consultation. For this visit, I personally interviewed the patient, and family member if accompanied, performed the pertinent parts of the history and physical examination. ROS included screening for appropriate endocrine conditions.   Today's diagnosis and plan were reviewed in detail with the patient who states understanding and agrees with plan. I discussed with the patient possible diagnosis, differential diagnosis, need for work up , treatment options, alternatives and side effects.     Please see note for details about time spent which includes:   · pre-visit preparation  · reviewing records  · face to face time with the patient   · timely documentation of the encounter  · ordering medications/tests  · communication with care team  · care coordination    I appreciate the opportunity to be part of your patient's medical care and will keep you, as the referring and primary physicians, informed about the care of your patient, including possible future surgery and pathology findings. Please feel free to contact me should you have any questions.    Total time 40 minutes     Leigh Taylor MD

## 2024-04-02 NOTE — PATIENT INSTRUCTIONS
Will refer to nuclear medicine for radioactive iodine ablation with thyrogen -Thyroid cancer s/p surgery Tg is high and abnormal US in the neck    Labs today   Labs and f/u in 2 mo     Low iodine diet diet for 10 -14 days before the scan - [email or web address]  Discussed radioactive iodine precautions     Thyroid medication dose: Synthroid 125 mcg . If still feeing jittery, take 1/2 tab on Sundays  Take you medication on empty stomach, not with any other medication or food. Wait 60 minutes before eating. Wait 4 hours before taking Vitamins, Calcium or iron. On the morning of the lab test, please take the medication after the blood test not before. Do not take Biotin 1 week before blood test.      Helpful websites   [email or web address]  [email or web address]      Did not tolerate atorvastatin and does not want to try them again  Increase ozempic 1 mg weekly   take b12 and vit D daily - OTC      Take metformin , fasxiga, zetia, and losartan

## 2024-04-03 LAB
THYROGLOB AB: <1 IU/ML
THYROGLOB IMA: 13.9 NG/ML

## 2024-04-04 NOTE — TELEPHONE ENCOUNTER
From: Paula Hugo  To: Leigh Taylor  Sent: 4/2/2024 4:24 PM CDT  Subject: Nuclear doctor specialist     Hi , I would like to ask if you can recommend a nuclear doctor that you think would be able to help me with my current situation. Thanks so much and I value your opinion.    Thanks     Paula Hugo

## 2024-06-04 ENCOUNTER — LAB ENCOUNTER (OUTPATIENT)
Dept: LAB | Age: 44
End: 2024-06-04
Attending: INTERNAL MEDICINE
Payer: COMMERCIAL

## 2024-06-04 ENCOUNTER — OFFICE VISIT (OUTPATIENT)
Facility: LOCATION | Age: 44
End: 2024-06-04

## 2024-06-04 VITALS
BODY MASS INDEX: 31.32 KG/M2 | OXYGEN SATURATION: 99 % | WEIGHT: 155.38 LBS | SYSTOLIC BLOOD PRESSURE: 90 MMHG | DIASTOLIC BLOOD PRESSURE: 56 MMHG | HEART RATE: 76 BPM | HEIGHT: 59 IN

## 2024-06-04 DIAGNOSIS — E11.65 UNCONTROLLED TYPE 2 DIABETES MELLITUS WITH HYPERGLYCEMIA (HCC): ICD-10-CM

## 2024-06-04 DIAGNOSIS — E89.0 HYPOTHYROIDISM, POSTSURGICAL: ICD-10-CM

## 2024-06-04 DIAGNOSIS — E78.5 DYSLIPIDEMIA: ICD-10-CM

## 2024-06-04 DIAGNOSIS — E53.8 LOW SERUM VITAMIN B12: ICD-10-CM

## 2024-06-04 DIAGNOSIS — E07.9 THYROID DISEASE: ICD-10-CM

## 2024-06-04 DIAGNOSIS — I10 PRIMARY HYPERTENSION: ICD-10-CM

## 2024-06-04 DIAGNOSIS — E89.0 S/P COMPLETE THYROIDECTOMY: ICD-10-CM

## 2024-06-04 DIAGNOSIS — C73 THYROID CANCER (HCC): ICD-10-CM

## 2024-06-04 DIAGNOSIS — C73 THYROID CANCER (HCC): Primary | ICD-10-CM

## 2024-06-04 LAB
B-HCG SERPL-ACNC: <2.6 MIU/ML
T4 FREE SERPL-MCNC: 0.3 NG/DL (ref 0.8–1.7)
TSI SER-ACNC: >150 MIU/ML (ref 0.55–4.78)

## 2024-06-04 PROCEDURE — 86800 THYROGLOBULIN ANTIBODY: CPT

## 2024-06-04 PROCEDURE — 36415 COLL VENOUS BLD VENIPUNCTURE: CPT

## 2024-06-04 PROCEDURE — 84702 CHORIONIC GONADOTROPIN TEST: CPT | Performed by: INTERNAL MEDICINE

## 2024-06-04 PROCEDURE — 84439 ASSAY OF FREE THYROXINE: CPT

## 2024-06-04 PROCEDURE — 99214 OFFICE O/P EST MOD 30 MIN: CPT | Performed by: INTERNAL MEDICINE

## 2024-06-04 PROCEDURE — 84443 ASSAY THYROID STIM HORMONE: CPT

## 2024-06-04 RX ORDER — ONDANSETRON 4 MG/1
4 TABLET, FILM COATED ORAL EVERY 8 HOURS PRN
Qty: 4 TABLET | Refills: 0 | Status: SHIPPED | OUTPATIENT
Start: 2024-06-04 | End: 2024-06-06

## 2024-06-04 RX ORDER — LORAZEPAM 0.5 MG/1
TABLET ORAL
COMMUNITY
Start: 2023-12-13

## 2024-06-04 NOTE — PATIENT INSTRUCTIONS
Will get CURRY therapy on 6/10/2024  Labs before CURRY dose   Labs and RTC in 1-2 mo   Resume meds after CURRY

## 2024-06-04 NOTE — PROGRESS NOTES
Reason for Visit:  thyroid cancer, hypothyroid, DM, obese, DLP, statin intolerance  Requesting Physician:   ..HARHS MALHOTRA      CHIEF COMPLAINT:    Chief Complaint   Patient presents with    Hypothyroidism     Follow-up.  Labs 4/24.        HISTORY OF PRESENT ILLNESS:   Paula Hugo is a 43 year old female who presents with thyroid cancer, PTC stage I, hypothyroid, DM, obese, DLP, statin intolerance and low vit D       She is getting CURRY dose on June 10th. She stopped Synthroid ~ 3 weeks ago and feels hypothyroid symptoms. She feels dysphagia also.   Has leg pain and swelling   Has anxiety   Will get dexcom today     She did labs 2/2024 and US in 3/2024     She was on Synthroid 125 mcg daily   Stopped all her meds        Thyroid cancer history   9/11/2023 Right mid thyroid nodule, FNA - PTC ([facility])  10/26/2023 Bilateral Total Thyroidectomy  by Justin Can MD      12/2023 US   . There is soft tissue mass in area of right thyroidectomy which has suspicious imaging appearance on ultrasound and should be further evaluated with CT or I 131 imaging study.   . Findings in left neck in thyroid region are probably granulation tissue but nonspecific.   . Mildly enlarged left submandibular and node is noted.  This does maintain normal lymph node architecture although should be correlated clinically.     12/8/2023 TSH >100, Tg 143, neg TgAb  12/26/023   on 50 mcg Synthroid  1/11/2024 TSH 3.1  1/16/2024 TSH 1.39 125 mcg Synthroid    12/2023 US post op month #2 when her TSH was > 100 which showed possible Rt thyroid bed residual.   2/2024 TSH 0.059  FT41.7  Tg 8.9 TgAb <1.0   3/28/2024 US right thyroid bed is a hypoechoic, 0.9 x 0.9 x 1.6 versus 1 x 1.1 x 1.7 cm nodule.  Within the left thyroid bed best seen on trans imaging is a stable 4 x 5 versus 4 x 6 mm echogenic area.   4/2/2024 TSH 0.8 , Tg 13.9, TgAb <1        Final Diagnosis:   Thyroid gland:  -Papillary thyroid carcinoma, right mid to lower lobe  (2.5 cm in greatest dimension) with calcification.  -Tumor is confined to the thyroid parenchyma.  -Margins of resection are free of tumor.  -Single right perithyroidal lymph node, negative for metastatic carcinoma.           SPECIMEN   Procedure  Total thyroidectomy   TUMOR   Tumor Focality  Unifocal   Tumor Characteristics     Tumor Site  Right lobe   Tumor Size  Greatest Dimension (Centimeters): 2.5 cm   Histologic Tumor Types and Subtypes  Papillary thyroid carcinoma   Tumor Proliferative Activity     Mitotic Rate  Less than 3 mitoses per 2mm2   Tumor Necrosis  Not identified   Angioinvasion (vascular invasion)  Not identified   Lymphatic Invasion  Not identified   Perineural Invasion  Not identified   Extrathyroidal Extension  Not identified   Margin Status  All margins negative for carcinoma   Distance from Invasive Carcinoma to Closest Margin  Less than 1 mm   REGIONAL LYMPH NODES   Regional Lymph Node Status  All regional lymph nodes negative for tumor   Number of Lymph Nodes Examined  1   Matt Level(s) Examined  Level VI   pTNM CLASSIFICATION (AJCC 8th Edition)   Reporting of pT, pN, and (when applicable) pM categories is based on information available to the pathologist at the time the report is issued. As per the AJCC (Chapter 1, 8th Ed.) it is the managing physician’s responsibility to establish the final pathologic stage based upon all pertinent information, including but potentially not limited to this pathology report.        pT Category  pT2   pN Category  pN0a   ADDITIONAL FINDINGS   Additional Findings  None identified     DM   On metfomrmin, farxiga and ozempic 1 mg/week   Last A1c value was 7.9% done 10/28/2023.    Lab Results   Component Value Date    A1C 7.9 (H) 10/28/2023    A1C 7.5 (H) 12/17/2019        HTN on Losrtan     DLP On zetia   She did not tolerate statin- forgetfulness. Does not want to try them again.     Hypocalcemia, stopped calcium and calcitriol.     Low vit D3   Low b12          ASSESSMENT AND PLAN:  43 year old woman with PTC stage I T2 N0 Mx, post op hypothyroid, DM, obese, DLP, statin intolerance, low b12 and low vit D    10/2023 pathology showed 2.5 cm PTC Rt side, no ETE/vascular/lymphatic/perineural invasion, negative margins, 0/1 LN VI,      Her initial recurrence risk per PRAKASH was ~ 5% based on pathology alone. Her post op w/u showed high Tg levels ( stimulated 143 and non-stimulated 8.9)    3/2024 US thyroid also showed some possible BL thyroid bed residual.          Will get CURRY treatment next week   Clinically hypothyroid - held meds ` 3 weeks ago   Will do labs today         Plan  Will get CURRY therapy on 6/10/2024  Labs before CURRY dose   Labs and RTC in 1-2 mo   Resume meds after CURRY   US 2024     Thyroid medication dose: Synthroid 125 mcg . If still feeing jittery, take 1/2 tab on Sundays  Take you medication on empty stomach, not with any other medication or food. Wait 60 minutes before eating. Wait 4 hours before taking Vitamins, Calcium or iron. On the morning of the lab test, please take the medication after the blood test not before. Do not take Biotin 1 week before blood test.             Did not tolerate atorvastatin and does not want to try them again    Will resume ozempic 1 mg weekly, metformin , farxiga, zetia, and losartan    take b12 and vit D daily - OTC        PAST MEDICAL HISTORY:   Past Medical History:    Diabetes (HCC)    Disorder of thyroid    High blood pressure    High cholesterol    History of COVID-19    Had flu like S/S x 2-3 days.    Neuropathy    feet    PONV (postoperative nausea and vomiting)    Visual impairment    glasses       PAST SURGICAL HISTORY:   Past Surgical History:   Procedure Laterality Date      2002      06/15/2005      2007      10/17/2013   10/2023 thyroidectomy      CURRENT MEDICATIONS:    Current Outpatient Medications   Medication Sig Dispense Refill    LORazepam 0.5 MG  Oral Tab Take 0.5-1 tablets (0.25-0.5 mg total) by mouth.      ondansetron (ZOFRAN) 4 mg tablet Take 1 tablet (4 mg total) by mouth every 8 (eight) hours as needed for Nausea. 4 tablet 0    semaglutide (OZEMPIC, 1 MG/DOSE,) 4 MG/3ML Subcutaneous Solution Pen-injector Inject 1 mg into the skin once a week. 9 mL 1    levothyroxine 125 MCG Oral Tab Take 1 tablet (125 mcg total) by mouth before breakfast. Synthroid brand only 90 tablet 1    cyanocobalamin 250 MCG Oral Tab Take 1 tablet (250 mcg total) by mouth daily. 90 tablet 1    FARXIGA 10 MG Oral Tab Take 1 tablet (10 mg total) by mouth. Can hold until diet improves      metFORMIN  MG Oral Tablet 24 Hr Take 1 tablet (500 mg total) by mouth 2 (two) times daily with meals. Can hold until diet improves      losartan 25 MG Oral Tab Take 1 tablet (25 mg total) by mouth at bedtime.      ezetimibe 10 MG Oral Tab Take 1 tablet (10 mg total) by mouth nightly.      ergocalciferol 1.25 MG (20862 UT) Oral Cap Take 1 capsule (50,000 Units total) by mouth twice a week.      oxyCODONE 5 MG Oral Tab Take 1 tablet (5 mg total) by mouth every 4 (four) hours as needed. 20 tablet 0     No medication comments found.   LORazepam 0.5 MG Oral Tab Take 0.5-1 tablets (0.25-0.5 mg total) by mouth.      ondansetron (ZOFRAN) 4 mg tablet Take 1 tablet (4 mg total) by mouth every 8 (eight) hours as needed for Nausea. 4 tablet 0    semaglutide (OZEMPIC, 1 MG/DOSE,) 4 MG/3ML Subcutaneous Solution Pen-injector Inject 1 mg into the skin once a week. 9 mL 1    levothyroxine 125 MCG Oral Tab Take 1 tablet (125 mcg total) by mouth before breakfast. Synthroid brand only 90 tablet 1    cyanocobalamin 250 MCG Oral Tab Take 1 tablet (250 mcg total) by mouth daily. 90 tablet 1    FARXIGA 10 MG Oral Tab Take 1 tablet (10 mg total) by mouth. Can hold until diet improves      metFORMIN  MG Oral Tablet 24 Hr Take 1 tablet (500 mg total) by mouth 2 (two) times daily with meals. Can hold until diet  improves      losartan 25 MG Oral Tab Take 1 tablet (25 mg total) by mouth at bedtime.      ezetimibe 10 MG Oral Tab Take 1 tablet (10 mg total) by mouth nightly.          ALLERGIES:  Allergies   Allergen Reactions    Penicillins HIVES       SOCIAL HISTORY:    Social History     Socioeconomic History    Marital status:    Tobacco Use    Smoking status: Never    Smokeless tobacco: Never   Vaping Use    Vaping status: Never Used   Substance and Sexual Activity    Alcohol use: Not Currently    Drug use: Never   Other Topics Concern    Caffeine Concern Yes     Comment: 1-2 cups a day    Special Diet Yes     Comment: 2-3 x week       FAMILY HISTORY:   Family History   Problem Relation Age of Onset    Diabetes Neg              PHYSICAL EXAM:   Height: 4' 11\" (149.9 cm) (06/04 1105)  Weight: 155 lb 6.4 oz (70.5 kg) (06/04 1105)  BSA (Calculated - sq m): 1.66 sq meters (06/04 1105)  Pulse: 76 (06/04 1105)  BP: 90/56 (06/04 1105)  Temp: --  Do Not Use - Resp Rate: --  SpO2: 99 % (06/04 1105)    Body mass index is 31.39 kg/m².    Obese   Well healed wound at the base of the neck   No LAP on exam      CONSTITUTIONAL:  Awake and alert. Age appropriate, good hygiene not in acute distress. Well nourished and well developed. no acute distress   PSYCH:   Orientated to time, place, person & situation, Normal mood and affect, memory intact, normal insight and judgment, cooperative  Neuro: speech is clear. Awake, alert, no aphasia, no facial asymmetry, no nuchal rigidity  EYES:  No proptosis, no ptosis, conjunctiva normal  ENT:  Normocephalic, atraumatic  Eye: EOMI, normal lids, no discharge, no conjunctival erythema. No exophthalmos/proptosis, Ptosis negative   No rhinorrhea, moist oral mucosa  Neck: full range of motion  Neck/Thyroid: neck inspection:  + scar,       DATA:   Pertinent data reviewed      03/28/24 15:32   US HEAD/NECK (CPT=76536) Rpt   Rpt: View report in Results Review for more information      Latest Reference  Range & Units 12/08/23 12:40 02/20/24 10:46   Thyroglob Ab 0.0 - 0.9 IU/mL <1.0 <1.0   Thyroglob LIA 1.5 - 38.5 ng/mL 143.4 (H) 8.9   (H): Data is abnormally high      No results for input(s): \"TSH\", \"T4F\", \"T3F\", \"THYP\" in the last 72 hours.  No results found.        Orders Placed This Encounter   Procedures    Thyroglobulin Antibody and Tumor Marker    TSH W Reflex To Free T4    HCG, Beta Subunit (Quant Pregnancy Test)    TSH W Reflex To Free T4    Thyroglobulin Antibody and Tumor Marker     Orders Placed This Encounter    Thyroglobulin Antibody and Tumor Marker     Standing Status:   Future     Standing Expiration Date:   6/4/2025     Order Specific Question:   Release to patient     Answer:   Immediate    TSH W Reflex To Free T4     Standing Status:   Future     Standing Expiration Date:   6/4/2025     Order Specific Question:   Release to patient     Answer:   Immediate    HCG, Beta Subunit (Quant Pregnancy Test)     Order Specific Question:   Release to patient     Answer:   Immediate    TSH W Reflex To Free T4     Standing Status:   Future     Standing Expiration Date:   6/4/2025     Order Specific Question:   Release to patient     Answer:   Immediate    Thyroglobulin Antibody and Tumor Marker     Standing Status:   Future     Standing Expiration Date:   6/4/2025     Order Specific Question:   Release to patient     Answer:   Immediate    LORazepam 0.5 MG Oral Tab     Sig: Take 0.5-1 tablets (0.25-0.5 mg total) by mouth.    ondansetron (ZOFRAN) 4 mg tablet     Sig: Take 1 tablet (4 mg total) by mouth every 8 (eight) hours as needed for Nausea.     Dispense:  4 tablet     Refill:  0          This is a specialized patient consultation in endocrinology and required comprehensive review of prior records, as well as current evaluation, with time required for consideration of complex endocrine issues and consultation. For this visit, I personally interviewed the patient, and family member if accompanied, performed  the pertinent parts of the history and physical examination. ROS included screening for appropriate endocrine conditions.   Today's diagnosis and plan were reviewed in detail with the patient who states understanding and agrees with plan. I discussed with the patient possible diagnosis, differential diagnosis, need for work up , treatment options, alternatives and side effects.     Please see note for details about time spent which includes:   · pre-visit preparation  · reviewing records  · face to face time with the patient   · timely documentation of the encounter  · ordering medications/tests  · communication with care team  · care coordination    I appreciate the opportunity to be part of your patient's medical care and will keep you, as the referring and primary physicians, informed about the care of your patient, including possible future surgery and pathology findings. Please feel free to contact me should you have any questions.        Leigh Taylor MD

## 2024-06-06 LAB
THYROGLOB AB: <1 IU/ML
THYROGLOB IMA: 107.2 NG/ML

## 2024-06-07 ENCOUNTER — PATIENT MESSAGE (OUTPATIENT)
Facility: LOCATION | Age: 44
End: 2024-06-07

## 2024-06-10 ENCOUNTER — HOSPITAL ENCOUNTER (OUTPATIENT)
Dept: NUCLEAR MEDICINE | Facility: HOSPITAL | Age: 44
Discharge: HOME | End: 2024-06-10
Attending: INTERNAL MEDICINE

## 2024-06-10 ENCOUNTER — HOSPITAL ENCOUNTER (OUTPATIENT)
Dept: LAB | Facility: HOSPITAL | Age: 44
Discharge: HOME | End: 2024-06-10
Attending: INTERNAL MEDICINE

## 2024-06-10 DIAGNOSIS — C73 THYROID CANCER (HCC): ICD-10-CM

## 2024-06-10 LAB — B-HCG SERPL-ACNC: <1 MIU/ML

## 2024-06-10 PROCEDURE — 79005 NUCLEAR RX ORAL ADMIN: CPT | Performed by: INTERNAL MEDICINE

## 2024-06-10 PROCEDURE — 36415 COLL VENOUS BLD VENIPUNCTURE: CPT | Performed by: INTERNAL MEDICINE

## 2024-06-10 PROCEDURE — 84702 CHORIONIC GONADOTROPIN TEST: CPT | Performed by: INTERNAL MEDICINE

## 2024-06-10 NOTE — TELEPHONE ENCOUNTER
From: Paula Hugo  To: Leigh Taylor  Sent: 6/7/2024 1:04 PM CDT  Subject: Monday treatment     Hi how are you? Can you please help me get a prescription send to [facility] pharmacy for prevident 500drymouth and sodium fluoride mouth rinse help during radiation iodine treatment Monday and the days of treatment. I had trouble filling dexcom and was wondering if you can help send a prescription for a diabetes meter and strips set to check my glucose during treatment. Thank you       I did call the doctors office and cancer center and left a message but no one got back to me until until Monday     Thanks so much for your help and support during this hard time

## 2024-06-11 RX ORDER — SODIUM FLUORIDE 5 MG/G
GEL, DENTIFRICE DENTAL
Refills: 0
Start: 2024-06-11

## 2024-06-11 RX ORDER — ACYCLOVIR 400 MG/1
1 TABLET ORAL
Qty: 3 EACH | Refills: 11 | Status: SHIPPED | OUTPATIENT
Start: 2024-06-11

## 2024-06-11 NOTE — TELEPHONE ENCOUNTER
Dr. Tayolr, Please review patient message sent on the 10th. I have pended the order for the Prevident and also the Dexcom. Thank you.

## 2024-06-17 ENCOUNTER — HOSPITAL ENCOUNTER (OUTPATIENT)
Dept: NUCLEAR MEDICINE | Facility: HOSPITAL | Age: 44
Discharge: HOME | End: 2024-06-17
Attending: INTERNAL MEDICINE

## 2024-06-17 ENCOUNTER — TELEPHONE (OUTPATIENT)
Dept: ENDOCRINOLOGY CLINIC | Facility: CLINIC | Age: 44
End: 2024-06-17

## 2024-06-17 DIAGNOSIS — C73 THYROID CANCER (HCC): ICD-10-CM

## 2024-06-17 PROCEDURE — 78018 THYROID MET IMAGING BODY: CPT | Performed by: INTERNAL MEDICINE

## 2024-06-18 ENCOUNTER — TELEPHONE (OUTPATIENT)
Dept: ENDOCRINOLOGY CLINIC | Facility: CLINIC | Age: 44
End: 2024-06-18

## 2024-06-18 DIAGNOSIS — C73 THYROID CANCER (HCC): Primary | ICD-10-CM

## 2024-06-18 NOTE — TELEPHONE ENCOUNTER
Regarding: Monday treatment   Contact: [phone]  ----- Message from Manjula Oglesby RN sent at 6/18/2024 12:35 PM CDT -----       ----- Message from Paula Hugo to Leigh Taylor MD sent at 6/17/2024  3:39 AM -----   Hello how are you ? Whatever strength you think it might be helpful. Today Monday I have the scan as it shows on my chart, I go in at 9:30 and my scan is at 10am.     I wanted to see if I can see the doctor today if possible if he has any openings at any time or a televisit perhaps. The therapy was rough and I’m  Glad today is the scan. I have dry mouth and my teeth feel dry and chalky, my neck feels really tight and sore and swollen, my ears hurt and I have a really bad tension cluster headache. I was wondering if there’s anything I can take or is there anything he suggest? After the scan or how long do I wait to start back on the thyroid (synthroid) medicine ? When can I start  the rest of my medicine ? What can I do or can he help me relive pain due to the body being so swollen after therapy ? Steroids or any medicine to reduce swelling ? Thanks       ----- Message -----       From:Adriana ORTIZ       Sent:6/10/2024 10:35 AM CDT         To:Paula Hugo    Subject:Monday treatment     Uziel Mitchell,     I see you are at the CURRY treatment now so I will refrain from calling you in fear of disrupting your treatment. I will respond to this message here. For the prevident 5000 dry mouth are you looking for the oral gel? For the fluoride mouth rinse are you looking for a particular strength?    Did Dr. Taylor give you a free sample dexcom at your last visit?    I can request these orders from Dr. Taylor to see if he can send. What is your preferred pharmacy for these orders?    Thank you,     Adriana ORTIZ RN  Endocrinology Clinical Staff  Phone: [phone]  Fax: [phone]       ----- Message -----       From:Paula Hugo       Sent:6/7/2024  1:04 PM CDT         To:Leigh Taylor    Subject:Monday  treatment     Hi how are you? Can you please help me get a prescription send to [facility] pharmacy for prevident 500drymouth and sodium fluoride mouth rinse help during radiation iodine treatment Monday and the days of treatment. I had trouble filling dexcom and was wondering if you can help send a prescription for a diabetes meter and strips set to check my glucose during treatment. Thank you       I did call the doctors office and cancer center and left a message but no one got back to me until until Monday     Thanks so much for your help and support during this hard time

## 2024-06-18 NOTE — TELEPHONE ENCOUNTER
Resume levothyroxine which will help with the swelling t  Will discuss more next visit   Thanks

## 2024-06-18 NOTE — TELEPHONE ENCOUNTER
New RX    Not listed:  Pt. Requesting refills for Ondansetron        Current Outpatient Medications   Medication Sig Dispense Refill

## 2024-06-20 RX ORDER — ONDANSETRON 4 MG/1
4 TABLET, FILM COATED ORAL EVERY 8 HOURS PRN
Qty: 12 TABLET | Refills: 0 | Status: SHIPPED | OUTPATIENT
Start: 2024-06-20

## 2024-07-11 ENCOUNTER — TELEPHONE (OUTPATIENT)
Facility: LOCATION | Age: 44
End: 2024-07-11

## 2024-07-11 NOTE — TELEPHONE ENCOUNTER
I spoke with her on the phone.  She is status post radioactive iodine treatment 1 month ago.  She has a dry mouth.  She gets tender swollen salivary glands.  I have asked her to hydrate.  She could also use sialagogues heat and massage.  If she should notice redness pain or swelling she will let me know.  
Patient requesting a callback regarding a pain in salivary glands and dryness in mouth after radiation.  
4 or more times/wk

## 2024-07-12 ENCOUNTER — OFFICE VISIT (OUTPATIENT)
Facility: CLINIC | Age: 44
End: 2024-07-12
Payer: COMMERCIAL

## 2024-07-12 VITALS
WEIGHT: 155 LBS | HEIGHT: 59.02 IN | DIASTOLIC BLOOD PRESSURE: 73 MMHG | HEART RATE: 85 BPM | BODY MASS INDEX: 31.25 KG/M2 | SYSTOLIC BLOOD PRESSURE: 103 MMHG

## 2024-07-12 DIAGNOSIS — C73 THYROID CANCER (HCC): Primary | ICD-10-CM

## 2024-07-12 DIAGNOSIS — I10 PRIMARY HYPERTENSION: ICD-10-CM

## 2024-07-12 DIAGNOSIS — E11.21 DIABETIC NEPHROPATHY ASSOCIATED WITH TYPE 2 DIABETES MELLITUS (HCC): ICD-10-CM

## 2024-07-12 DIAGNOSIS — E78.5 DYSLIPIDEMIA: ICD-10-CM

## 2024-07-12 DIAGNOSIS — R79.89 LOW VITAMIN D LEVEL: ICD-10-CM

## 2024-07-12 DIAGNOSIS — E53.8 LOW SERUM VITAMIN B12: ICD-10-CM

## 2024-07-12 DIAGNOSIS — E11.42 DIABETIC PERIPHERAL NEUROPATHY (HCC): ICD-10-CM

## 2024-07-12 DIAGNOSIS — R73.09 HIGH GLUCOSE LEVEL: ICD-10-CM

## 2024-07-12 DIAGNOSIS — R68.2 DRY MOUTH: ICD-10-CM

## 2024-07-12 DIAGNOSIS — E07.9 THYROID DISEASE: ICD-10-CM

## 2024-07-12 DIAGNOSIS — E89.0 HYPOTHYROIDISM, POSTSURGICAL: ICD-10-CM

## 2024-07-12 DIAGNOSIS — E11.65 UNCONTROLLED TYPE 2 DIABETES MELLITUS WITH HYPERGLYCEMIA (HCC): ICD-10-CM

## 2024-07-12 DIAGNOSIS — E89.0 S/P COMPLETE THYROIDECTOMY: ICD-10-CM

## 2024-07-12 DIAGNOSIS — E78.2 MIXED HYPERLIPIDEMIA: ICD-10-CM

## 2024-07-12 PROCEDURE — 99215 OFFICE O/P EST HI 40 MIN: CPT | Performed by: INTERNAL MEDICINE

## 2024-07-12 RX ORDER — XYLITOL 500 MG
1 MUCO-ADHESIVE BUCCAL TABLET MUCOUS MEMBRANE DAILY
Qty: 90 EACH | Refills: 0 | Status: SHIPPED | OUTPATIENT
Start: 2024-07-12

## 2024-07-12 RX ORDER — SODIUM FLUORIDE 5 MG/G
1 GEL, DENTIFRICE DENTAL DAILY
Qty: 1 EACH | Refills: 1 | Status: SHIPPED | OUTPATIENT
Start: 2024-07-12 | End: 2024-10-10

## 2024-07-12 RX ORDER — CEVIMELINE HYDROCHLORIDE 30 MG/1
30 CAPSULE ORAL DAILY
Qty: 90 CAPSULE | Refills: 0 | Status: SHIPPED | OUTPATIENT
Start: 2024-07-12

## 2024-07-12 NOTE — PROGRESS NOTES
Reason for Visit:  thyroid cancer, hypothyroid, DM, obese, DLP, statin intolerance  Requesting Physician:   ..HARSH MALHOTRA      CHIEF COMPLAINT:    Chief Complaint   Patient presents with    Hypothyroidism     Pt is in for a Thyroid follow up2        HISTORY OF PRESENT ILLNESS:   Paula Hugo is a 43 year old female who presents with thyroid cancer, PTC stage I, hypothyroid, DM, obese, DLP, statin intolerance and low vit D    Had CURRY w/d and had hypothyroid sx when off LT4. Had leg swelling, eyelid and facial swelling, bloating, low energy. Neck muscles, voice is not back to normal.   Dry mouth and nose   Neck tightness  Now resumed  Synthroid 125 mcg daily  Has not started ozempic       Symptoms improved but not back to normal   Dry mouth and asked for Rx per nuc med      Thyroid cancer history   9/11/2023 Right mid thyroid nodule, FNA - PTC ([place] [facility])  10/26/2023 Bilateral Total Thyroidectomy  by Justin Can MD      12/2023 US   . There is soft tissue mass in area of right thyroidectomy which has suspicious imaging appearance on ultrasound and should be further evaluated with CT or I 131 imaging study.   . Findings in left neck in thyroid region are probably granulation tissue but nonspecific.   . Mildly enlarged left submandibular and node is noted.  This does maintain normal lymph node architecture although should be correlated clinically.     12/8/2023 TSH >100, Tg 143, neg TgAb  12/26/023   on 50 mcg Synthroid  1/11/2024 TSH 3.1  1/16/2024 TSH 1.39 125 mcg Synthroid    12/2023 US post op month #2 when her TSH was > 100 which showed possible Rt thyroid bed residual.   2/2024 TSH 0.059  FT41.7  Tg 8.9 TgAb <1.0   3/28/2024 US right thyroid bed is a hypoechoic, 0.9 x 0.9 x 1.6 versus 1 x 1.1 x 1.7 cm nodule.  Within the left thyroid bed best seen on trans imaging is a stable 4 x 5 versus 4 x 6 mm echogenic area.   4/2/2024 TSH 0.8 , Tg 13.9, TgAb <1    6/10/2024  CURRY treatment 106.9 mCi I-131    6/17/2024 Post tx scan There is radiotracer accumulation within the thyroid bed bilaterally which could reflect recurrent or residual neoplasm and/or residual thyroid tissue.  No definitive distant metastatic disease.     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  -Tumor is confined to the thyroid parenchyma.  -Margins of resection are free of tumor.  -Single right perithyroidal lymph node, negative for metastatic carcinoma.           SPECIMEN   Procedure  Total thyroidectomy   TUMOR   Tumor Focality  Unifocal   Tumor Characteristics     Tumor Site  Right lobe   Tumor Size  Greatest Dimension (Centimeters): 2.5 cm   Histologic Tumor Types and Subtypes  Papillary thyroid carcinoma   Tumor Proliferative Activity     Mitotic Rate  Less than 3 mitoses per 2mm2   Tumor Necrosis  Not identified   Angioinvasion (vascular invasion)  Not identified   Lymphatic Invasion  Not identified   Perineural Invasion  Not identified   Extrathyroidal Extension  Not identified   Margin Status  All margins negative for carcinoma   Distance from Invasive Carcinoma to Closest Margin  Less than 1 mm   REGIONAL LYMPH NODES   Regional Lymph Node Status  All regional lymph nodes negative for tumor   Number of Lymph Nodes Examined  1   Matt Level(s) Examined  Level VI   pTNM CLASSIFICATION (AJCC 8th Edition)   Reporting of pT, pN, and (when applicable) pM categories is based on information available to the pathologist at the time the report is issued. As per the AJCC (Chapter 1, 8th Ed.) it is the managing physician’s responsibility to establish the final pathologic stage based upon all pertinent information, including but potentially not limited to this pathology report.        pT Category  pT2   pN Category  pN0a   ADDITIONAL FINDINGS   Additional Findings  None identified     DM   On metfomrmin and farxiga   holding ozempic 1 mg/week   Last  A1c value was 7.9% done 10/28/2023.    Lab Results   Component Value Date    A1C 7.9 (H) 10/28/2023    A1C 7.5 (H) 12/17/2019        HTN on Losrtan     DLP On zetia   She did not tolerate statin- forgetfulness. Does not want to try them again.        Low vit D3   Low b12         ASSESSMENT AND PLAN:  43 year old woman with PTC stage I T2 N0 M0, s/p CURRY 106 and post tx scan no distant mets.  post op hypothyroid, DM, obese, DLP, statin intolerance, low b12 and low vit D    10/2023 pathology showed 2.5 cm PTC Rt side, no ETE/vascular/lymphatic/perineural invasion, negative margins, 0/1 LN VI,      Her initial recurrence risk per PRAKASH was ~ 5% based on pathology alone. Her post op w/u showed high Tg levels ( stimulated 143 and non-stimulated 8.9)    3/2024 US thyroid also showed some possible BL thyroid bed residual.      6/10/2024  CURRY treatment 106.9 mCi I-131   6/17/2024 Post tx scan There is radiotracer accumulation within the thyroid bed bilaterally which could reflect recurrent or residual neoplasm and/or residual thyroid tissue.  No definitive distant metastatic disease.        Clinically, euthyroid   Biochemically, will get labs today           Plan   Labs today   Labs and RTC in  2 mo     US 9/2024     Thyroid medication dose: Synthroid 125 mcg /day   Take you medication on empty stomach, not with any other medication or food. Wait 60 minutes before eating. Wait 4 hours before taking Vitamins, Calcium or iron. On the morning of the lab test, please take the medication after the blood test not before. Do not take Biotin 1 week before blood test.             Did not tolerate atorvastatin and does not want to try them again    Will resume ozempic 1 mg weekly soon  on metformin , farxiga, zetia, and losartan    take b12 and vit D daily - OTC        PAST MEDICAL HISTORY:   Past Medical History:    Diabetes (HCC)    Disorder of thyroid    High blood pressure    High cholesterol    History of COVID-19    Had flu like  S/S x 2-3 days.    Hypothyroidism    Neuropathy    feet    PONV (postoperative nausea and vomiting)    Visual impairment    glasses       PAST SURGICAL HISTORY:   Past Surgical History:   Procedure Laterality Date      2002      06/15/2005      2007      10/17/2013   10/2023 thyroidectomy      CURRENT MEDICATIONS:    Current Outpatient Medications   Medication Sig Dispense Refill    Sodium Fluoride (PREVIDENT 5000 DRY MOUTH) 1.1 % Dental Gel Place 1 each onto teeth daily. 1 each 1    Cevimeline HCl 30 MG Oral Cap Take 1 capsule (30 mg total) by mouth daily. 90 capsule 0    Xylitol (XYLIMELTS) 500 MG Mouth/Throat Disk Use as directed 1 each in the mouth or throat daily. 90 each 0    ondansetron (ZOFRAN) 4 mg tablet Take 1 tablet (4 mg total) by mouth every 8 (eight) hours as needed for Nausea. 12 tablet 0    Continuous Glucose Sensor (DEXCOM G7 SENSOR) Does not apply Misc 1 each Every 10 days. Use as directed every 10 days 3 each 11    LORazepam 0.5 MG Oral Tab Take 0.5-1 tablets (0.25-0.5 mg total) by mouth.      semaglutide (OZEMPIC, 1 MG/DOSE,) 4 MG/3ML Subcutaneous Solution Pen-injector Inject 1 mg into the skin once a week. 9 mL 1    levothyroxine 125 MCG Oral Tab Take 1 tablet (125 mcg total) by mouth before breakfast. Synthroid brand only 90 tablet 1    cyanocobalamin 250 MCG Oral Tab Take 1 tablet (250 mcg total) by mouth daily. 90 tablet 1    FARXIGA 10 MG Oral Tab Take 1 tablet (10 mg total) by mouth. Can hold until diet improves      metFORMIN  MG Oral Tablet 24 Hr Take 1 tablet (500 mg total) by mouth 2 (two) times daily with meals. Can hold until diet improves      ergocalciferol 1.25 MG (06025 UT) Oral Cap Take 1 capsule (50,000 Units total) by mouth twice a week.      oxyCODONE 5 MG Oral Tab Take 1 tablet (5 mg total) by mouth every 4 (four) hours as needed. 20 tablet 0    losartan 25 MG Oral Tab Take 1 tablet (25 mg total) by mouth at bedtime.       ezetimibe 10 MG Oral Tab Take 1 tablet (10 mg total) by mouth nightly.       No medication comments found.   Sodium Fluoride (PREVIDENT 5000 DRY MOUTH) 1.1 % Dental Gel Place 1 each onto teeth daily. 1 each 1    Cevimeline HCl 30 MG Oral Cap Take 1 capsule (30 mg total) by mouth daily. 90 capsule 0    Xylitol (XYLIMELTS) 500 MG Mouth/Throat Disk Use as directed 1 each in the mouth or throat daily. 90 each 0        ALLERGIES:  Allergies   Allergen Reactions    Penicillins HIVES       SOCIAL HISTORY:    Social History     Socioeconomic History    Marital status:    Tobacco Use    Smoking status: Never    Smokeless tobacco: Never   Vaping Use    Vaping status: Never Used   Substance and Sexual Activity    Alcohol use: Not Currently    Drug use: Never   Other Topics Concern    Caffeine Concern Yes     Comment: 1-2 cups a day    Special Diet Yes     Comment: 2-3 x week       FAMILY HISTORY:   Family History   Problem Relation Age of Onset    Diabetes Neg              PHYSICAL EXAM:   Height: 4' 11.02\" (149.9 cm) (07/12 0734)  Weight: 155 lb (70.3 kg) (07/12 0734)  BSA (Calculated - sq m): 1.66 sq meters (07/12 0734)  Pulse: 85 (07/12 0734)  BP: 103/73 (07/12 0734)  Temp: --  Do Not Use - Resp Rate: --  SpO2: --    Body mass index is 31.29 kg/m².    Obese   Well healed wound at the base of the neck   No LAP on exam      CONSTITUTIONAL:  Awake and alert. Age appropriate, good hygiene not in acute distress. Well nourished and well developed. no acute distress   PSYCH:   Orientated to time, place, person & situation, Normal mood and affect, memory intact, normal insight and judgment, cooperative  Neuro: speech is clear. Awake, alert, no aphasia, no facial asymmetry, no nuchal rigidity  EYES:  No proptosis, no ptosis, conjunctiva normal  ENT:  Normocephalic, atraumatic  Eye: EOMI, normal lids, no discharge, no conjunctival erythema. No exophthalmos/proptosis, Ptosis negative   No rhinorrhea, moist oral mucosa  Neck:  full range of motion  Neck/Thyroid: neck inspection:  + scar,       DATA:   Pertinent data reviewed      03/28/24 15:32   US HEAD/NECK (CPT=76536) Rpt   Rpt: View report in Results Review for more information      Latest Reference Range & Units 12/08/23 12:40 02/20/24 10:46   Thyroglob Ab 0.0 - 0.9 IU/mL <1.0 <1.0   Thyroglob LIA 1.5 - 38.5 ng/mL 143.4 (H) 8.9   (H): Data is abnormally high      No results for input(s): \"TSH\", \"T4F\", \"T3F\", \"THYP\" in the last 72 hours.  No results found.        Orders Placed This Encounter   Procedures    TSH W Reflex To Free T4    Thyroglobulin Antibody and Tumor Marker    Comp Metabolic Panel (14)    Hemoglobin A1C    Lipid Panel    Microalb/Creat Ratio, Random Urine    Magnesium    TSH W Reflex To Free T4    Thyroglobulin Antibody and Tumor Marker     Orders Placed This Encounter    TSH W Reflex To Free T4     Standing Status:   Future     Standing Expiration Date:   7/12/2025     Order Specific Question:   Release to patient     Answer:   Immediate    Thyroglobulin Antibody and Tumor Marker     Standing Status:   Future     Standing Expiration Date:   7/12/2025     Order Specific Question:   Release to patient     Answer:   Immediate    Comp Metabolic Panel (14)     Order Specific Question:   Release to patient     Answer:   Immediate    Hemoglobin A1C     Standing Status:   Future     Standing Expiration Date:   7/12/2025     Order Specific Question:   Release to patient     Answer:   Immediate    Lipid Panel     Standing Status:   Future     Standing Expiration Date:   7/12/2025     Order Specific Question:   Release to patient     Answer:   Immediate    Microalb/Creat Ratio, Random Urine     Standing Status:   Future     Standing Expiration Date:   7/12/2025     Order Specific Question:   Release to patient     Answer:   Immediate    Magnesium     Standing Status:   Future     Standing Expiration Date:   7/12/2025     Order Specific Question:   Release to patient     Answer:    Immediate    TSH W Reflex To Free T4     Standing Status:   Future     Standing Expiration Date:   7/12/2025    Thyroglobulin Antibody and Tumor Marker     Standing Status:   Future     Standing Expiration Date:   7/12/2025     Order Specific Question:   Release to patient     Answer:   Immediate    Sodium Fluoride (PREVIDENT 5000 DRY MOUTH) 1.1 % Dental Gel     Sig: Place 1 each onto teeth daily.     Dispense:  1 each     Refill:  1    Cevimeline HCl 30 MG Oral Cap     Sig: Take 1 capsule (30 mg total) by mouth daily.     Dispense:  90 capsule     Refill:  0    Xylitol (XYLIMELTS) 500 MG Mouth/Throat Disk     Sig: Use as directed 1 each in the mouth or throat daily.     Dispense:  90 each     Refill:  0          This is a specialized patient consultation in endocrinology and required comprehensive review of prior records, as well as current evaluation, with time required for consideration of complex endocrine issues and consultation. For this visit, I personally interviewed the patient, and family member if accompanied, performed the pertinent parts of the history and physical examination. ROS included screening for appropriate endocrine conditions.   Today's diagnosis and plan were reviewed in detail with the patient who states understanding and agrees with plan. I discussed with the patient possible diagnosis, differential diagnosis, need for work up , treatment options, alternatives and side effects.     Please see note for details about time spent which includes:   · pre-visit preparation  · reviewing records  · face to face time with the patient   · timely documentation of the encounter  · ordering medications/tests  · communication with care team  · care coordination    I appreciate the opportunity to be part of your patient's medical care and will keep you, as the referring and primary physicians, informed about the care of your patient, including possible future surgery and pathology findings. Please feel  free to contact me should you have any questions.    Total 40 minutes     Leigh Taylor MD

## 2024-07-30 ENCOUNTER — LAB ENCOUNTER (OUTPATIENT)
Dept: LAB | Age: 44
End: 2024-07-30
Attending: INTERNAL MEDICINE
Payer: COMMERCIAL

## 2024-07-30 DIAGNOSIS — E66.9 TYPE 2 DIABETES MELLITUS WITH OBESITY (HCC): ICD-10-CM

## 2024-07-30 DIAGNOSIS — C73 THYROID CANCER (HCC): ICD-10-CM

## 2024-07-30 DIAGNOSIS — E11.42 DIABETIC PERIPHERAL NEUROPATHY (HCC): ICD-10-CM

## 2024-07-30 DIAGNOSIS — E11.21 DIABETIC NEPHROPATHY ASSOCIATED WITH TYPE 2 DIABETES MELLITUS (HCC): ICD-10-CM

## 2024-07-30 DIAGNOSIS — R79.89 LOW VITAMIN D LEVEL: ICD-10-CM

## 2024-07-30 DIAGNOSIS — E78.5 DYSLIPIDEMIA: ICD-10-CM

## 2024-07-30 DIAGNOSIS — E53.8 LOW SERUM VITAMIN B12: ICD-10-CM

## 2024-07-30 DIAGNOSIS — E78.2 MIXED HYPERLIPIDEMIA: ICD-10-CM

## 2024-07-30 DIAGNOSIS — E07.9 THYROID DISEASE: ICD-10-CM

## 2024-07-30 DIAGNOSIS — E11.69 TYPE 2 DIABETES MELLITUS WITH OBESITY (HCC): ICD-10-CM

## 2024-07-30 DIAGNOSIS — E11.65 UNCONTROLLED TYPE 2 DIABETES MELLITUS WITH HYPERGLYCEMIA (HCC): ICD-10-CM

## 2024-07-30 DIAGNOSIS — E89.0 S/P COMPLETE THYROIDECTOMY: ICD-10-CM

## 2024-07-30 DIAGNOSIS — I10 PRIMARY HYPERTENSION: ICD-10-CM

## 2024-07-30 DIAGNOSIS — R73.09 HIGH GLUCOSE LEVEL: ICD-10-CM

## 2024-07-30 DIAGNOSIS — E89.0 HYPOTHYROIDISM, POSTSURGICAL: ICD-10-CM

## 2024-07-30 LAB
ALBUMIN SERPL-MCNC: 5 G/DL (ref 3.2–4.8)
ALBUMIN/GLOB SERPL: 2 {RATIO} (ref 1–2)
ALP LIVER SERPL-CCNC: 85 U/L
ALT SERPL-CCNC: 53 U/L
ANION GAP SERPL CALC-SCNC: 5 MMOL/L (ref 0–18)
AST SERPL-CCNC: 38 U/L (ref ?–34)
BILIRUB SERPL-MCNC: 0.9 MG/DL (ref 0.3–1.2)
BUN BLD-MCNC: 13 MG/DL (ref 9–23)
CALCIUM BLD-MCNC: 9.3 MG/DL (ref 8.7–10.4)
CHLORIDE SERPL-SCNC: 106 MMOL/L (ref 98–112)
CHOLEST SERPL-MCNC: 231 MG/DL (ref ?–200)
CO2 SERPL-SCNC: 25 MMOL/L (ref 21–32)
CREAT BLD-MCNC: 0.47 MG/DL
CREAT UR-SCNC: 71.3 MG/DL
EGFRCR SERPLBLD CKD-EPI 2021: 121 ML/MIN/1.73M2 (ref 60–?)
EST. AVERAGE GLUCOSE BLD GHB EST-MCNC: 126 MG/DL (ref 68–126)
FASTING PATIENT LIPID ANSWER: YES
FASTING STATUS PATIENT QL REPORTED: YES
GLOBULIN PLAS-MCNC: 2.5 G/DL (ref 2–3.5)
GLUCOSE BLD-MCNC: 114 MG/DL (ref 70–99)
HBA1C MFR BLD: 6 % (ref ?–5.7)
HDLC SERPL-MCNC: 48 MG/DL (ref 40–59)
LDLC SERPL CALC-MCNC: 157 MG/DL (ref ?–100)
MAGNESIUM SERPL-MCNC: 2.1 MG/DL (ref 1.6–2.6)
MICROALBUMIN UR-MCNC: 0.7 MG/DL
MICROALBUMIN/CREAT 24H UR-RTO: 9.8 UG/MG (ref ?–30)
NONHDLC SERPL-MCNC: 183 MG/DL (ref ?–130)
OSMOLALITY SERPL CALC.SUM OF ELEC: 283 MOSM/KG (ref 275–295)
POTASSIUM SERPL-SCNC: 4 MMOL/L (ref 3.5–5.1)
PROT SERPL-MCNC: 7.5 G/DL (ref 5.7–8.2)
SODIUM SERPL-SCNC: 136 MMOL/L (ref 136–145)
T4 FREE SERPL-MCNC: 1.4 NG/DL (ref 0.8–1.7)
TRIGL SERPL-MCNC: 146 MG/DL (ref 30–149)
TSI SER-ACNC: 0.55 MIU/ML (ref 0.55–4.78)
VLDLC SERPL CALC-MCNC: 28 MG/DL (ref 0–30)

## 2024-07-30 PROCEDURE — 83735 ASSAY OF MAGNESIUM: CPT

## 2024-07-30 PROCEDURE — 80053 COMPREHEN METABOLIC PANEL: CPT

## 2024-07-30 PROCEDURE — 83036 HEMOGLOBIN GLYCOSYLATED A1C: CPT

## 2024-07-30 PROCEDURE — 86800 THYROGLOBULIN ANTIBODY: CPT

## 2024-07-30 PROCEDURE — 82043 UR ALBUMIN QUANTITATIVE: CPT

## 2024-07-30 PROCEDURE — 36415 COLL VENOUS BLD VENIPUNCTURE: CPT

## 2024-07-30 PROCEDURE — 80061 LIPID PANEL: CPT

## 2024-07-30 PROCEDURE — 84432 ASSAY OF THYROGLOBULIN: CPT

## 2024-07-30 PROCEDURE — 84439 ASSAY OF FREE THYROXINE: CPT | Performed by: INTERNAL MEDICINE

## 2024-07-30 PROCEDURE — 84443 ASSAY THYROID STIM HORMONE: CPT | Performed by: INTERNAL MEDICINE

## 2024-07-30 PROCEDURE — 82570 ASSAY OF URINE CREATININE: CPT

## 2024-07-31 LAB
THYROGLOB AB: <1 IU/ML
THYROGLOB IMA: 18.8 NG/ML

## 2024-08-08 ENCOUNTER — TELEPHONE (OUTPATIENT)
Dept: ENDOCRINOLOGY CLINIC | Facility: CLINIC | Age: 44
End: 2024-08-08

## 2024-08-08 ENCOUNTER — HOSPITAL ENCOUNTER (OUTPATIENT)
Dept: ULTRASOUND IMAGING | Age: 44
Discharge: HOME | End: 2024-08-08
Attending: INTERNAL MEDICINE
Payer: COMMERCIAL

## 2024-08-08 DIAGNOSIS — C73 THYROID CANCER (HCC): ICD-10-CM

## 2024-08-08 PROCEDURE — 76536 US EXAM OF HEAD AND NECK: CPT | Performed by: INTERNAL MEDICINE

## 2024-08-08 NOTE — TELEPHONE ENCOUNTER
Called pt to schedule in sept, pt says is it okay to be seen earlier? MD please advise ok to add on?

## 2024-08-09 NOTE — TELEPHONE ENCOUNTER
Called patient to schedule VV for mid-August per Dr. Taylor.  Patient refused a video visit stated \"wants in-person visit\" patient requested [place] location as it is closer to her house.  She was notified that next in-person visit wasn't until 9/23/24 at 8:30 am patient stated \"NO\" hung up the phone.

## 2024-08-20 ENCOUNTER — OFFICE VISIT (OUTPATIENT)
Facility: LOCATION | Age: 44
End: 2024-08-20

## 2024-08-20 VITALS
SYSTOLIC BLOOD PRESSURE: 96 MMHG | HEART RATE: 92 BPM | DIASTOLIC BLOOD PRESSURE: 60 MMHG | OXYGEN SATURATION: 98 % | HEIGHT: 59 IN | BODY MASS INDEX: 31.58 KG/M2 | WEIGHT: 156.63 LBS

## 2024-08-20 DIAGNOSIS — R74.01 HIGH LIVER TRANSAMINASE LEVEL: ICD-10-CM

## 2024-08-20 DIAGNOSIS — E11.42 DIABETIC PERIPHERAL NEUROPATHY (HCC): Primary | ICD-10-CM

## 2024-08-20 DIAGNOSIS — E78.5 DYSLIPIDEMIA: ICD-10-CM

## 2024-08-20 DIAGNOSIS — C73 THYROID CANCER (HCC): ICD-10-CM

## 2024-08-20 DIAGNOSIS — R73.09 HIGH GLUCOSE LEVEL: ICD-10-CM

## 2024-08-20 DIAGNOSIS — I10 PRIMARY HYPERTENSION: ICD-10-CM

## 2024-08-20 DIAGNOSIS — E11.21 DIABETIC NEPHROPATHY ASSOCIATED WITH TYPE 2 DIABETES MELLITUS (HCC): ICD-10-CM

## 2024-08-20 DIAGNOSIS — E78.2 MIXED HYPERLIPIDEMIA: ICD-10-CM

## 2024-08-20 DIAGNOSIS — E89.0 HYPOTHYROIDISM, POSTSURGICAL: ICD-10-CM

## 2024-08-20 DIAGNOSIS — R79.89 LOW VITAMIN D LEVEL: ICD-10-CM

## 2024-08-20 DIAGNOSIS — E07.9 THYROID DISEASE: ICD-10-CM

## 2024-08-20 DIAGNOSIS — E11.65 UNCONTROLLED TYPE 2 DIABETES MELLITUS WITH HYPERGLYCEMIA (HCC): ICD-10-CM

## 2024-08-20 DIAGNOSIS — E53.8 LOW SERUM VITAMIN B12: ICD-10-CM

## 2024-08-20 DIAGNOSIS — E89.0 S/P COMPLETE THYROIDECTOMY: ICD-10-CM

## 2024-08-20 PROCEDURE — 99214 OFFICE O/P EST MOD 30 MIN: CPT | Performed by: INTERNAL MEDICINE

## 2024-08-20 RX ORDER — METFORMIN HCL 500 MG
500 TABLET, EXTENDED RELEASE 24 HR ORAL 2 TIMES DAILY WITH MEALS
Qty: 180 TABLET | Refills: 0 | Status: SHIPPED | OUTPATIENT
Start: 2024-08-20

## 2024-08-20 RX ORDER — SEMAGLUTIDE 1.34 MG/ML
1 INJECTION, SOLUTION SUBCUTANEOUS WEEKLY
Qty: 9 ML | Refills: 1 | Status: SHIPPED | OUTPATIENT
Start: 2024-08-20

## 2024-08-20 RX ORDER — LOSARTAN POTASSIUM 25 MG/1
25 TABLET ORAL NIGHTLY
Qty: 90 TABLET | Refills: 0 | Status: SHIPPED | OUTPATIENT
Start: 2024-08-20

## 2024-08-20 RX ORDER — LEVOTHYROXINE SODIUM 125 UG/1
125 TABLET ORAL
Qty: 90 TABLET | Refills: 1 | Status: SHIPPED | OUTPATIENT
Start: 2024-08-20 | End: 2025-02-16

## 2024-08-20 RX ORDER — DAPAGLIFLOZIN 10 MG/1
10 TABLET, FILM COATED ORAL DAILY
Qty: 30 TABLET | Refills: 5 | Status: SHIPPED | OUTPATIENT
Start: 2024-08-20

## 2024-08-20 NOTE — PATIENT INSTRUCTIONS
Labs and RTC in  9/2024     2/2025     Thyroid medication dose: Synthroid 125 mcg /day. Take 1/2 tab on Sunday   Take you medication on empty stomach, not with any other medication or food. Wait 60 minutes before eating. Wait 4 hours before taking Vitamins, Calcium or iron. On the morning of the lab test, please take the medication after the blood test not before. Do not take Biotin 1 week before blood test.      Did not tolerate atorvastatin and does not want to try them again    Ozempic 1 mg weekly  Metformin   Farxiga  Zetia  Losartan    B12 - OTC    vit D daily - OTC

## 2024-08-20 NOTE — PROGRESS NOTES
Reason for Visit:  thyroid cancer, hypothyroid, DM, obese, DLP, statin intolerance  Requesting Physician:   ..HARSH MALHOTRA      CHIEF COMPLAINT:    Chief Complaint   Patient presents with    Hypothyroidism     Follow-up on US of head/neck done 8/8/24.  Patient wants Synthroid name brand.  \"Stated doesn't feel the same when taking levothyroxine.\"    Diabetes     Last A1c done 7/30/24 was 6.0        HISTORY OF PRESENT ILLNESS:   Paula Hugo is a 43 year old female who presents with thyroid cancer, PTC stage I, hypothyroid, DM, obese, DLP, statin intolerance and low vit D       Now on  Synthroid 125 mcg daily. Feels better on this dose. She has more energy. Still has anxiety.   Had labs and US neck.   We discussed labs   We reviewed images and discussed findings. Still has Rt thyroid bed residual. Tg levels still high.        Thyroid cancer history   9/11/2023 Right mid thyroid nodule, FNA - PTC ([place] [facility])  10/26/2023 Bilateral Total Thyroidectomy  by Justin Can MD      12/2023 US   . There is soft tissue mass in area of right thyroidectomy which has suspicious imaging appearance on ultrasound and should be further evaluated with CT or I 131 imaging study.   . Findings in left neck in thyroid region are probably granulation tissue but nonspecific.   . Mildly enlarged left submandibular and node is noted.  This does maintain normal lymph node architecture although should be correlated clinically.     12/8/2023 TSH >100, Tg 143, neg TgAb  12/26/023   on 50 mcg Synthroid  1/11/2024 TSH 3.1  1/16/2024 TSH 1.39 125 mcg Synthroid    12/2023 US post op month #2 when her TSH was > 100 which showed possible Rt thyroid bed residual.   2/2024 TSH 0.059  FT41.7  Tg 8.9 TgAb <1.0   3/28/2024 US right thyroid bed is a hypoechoic, 0.9 x 0.9 x 1.6 versus 1 x 1.1 x 1.7 cm nodule.  Within the left thyroid bed best seen on trans imaging is a stable 4 x 5 versus 4 x 6 mm echogenic area.   4/2/2024 TSH 0.8 , Tg 13.9,  TgAb <1    6/10/2024  CURRY treatment 106.9 mCi I-131   6/17/2024 Post tx scan There is radiotracer accumulation within the thyroid bed bilaterally which could reflect recurrent or residual neoplasm and/or residual thyroid tissue.  No definitive distant metastatic disease.     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  -Tumor is confined to the thyroid parenchyma.  -Margins of resection are free of tumor.  -Single right perithyroidal lymph node, negative for metastatic carcinoma.           SPECIMEN   Procedure  Total thyroidectomy   TUMOR   Tumor Focality  Unifocal   Tumor Characteristics     Tumor Site  Right lobe   Tumor Size  Greatest Dimension (Centimeters): 2.5 cm   Histologic Tumor Types and Subtypes  Papillary thyroid carcinoma   Tumor Proliferative Activity     Mitotic Rate  Less than 3 mitoses per 2mm2   Tumor Necrosis  Not identified   Angioinvasion (vascular invasion)  Not identified   Lymphatic Invasion  Not identified   Perineural Invasion  Not identified   Extrathyroidal Extension  Not identified   Margin Status  All margins negative for carcinoma   Distance from Invasive Carcinoma to Closest Margin  Less than 1 mm   REGIONAL LYMPH NODES   Regional Lymph Node Status  All regional lymph nodes negative for tumor   Number of Lymph Nodes Examined  1   Matt Level(s) Examined  Level VI   pTNM CLASSIFICATION (AJCC 8th Edition)   Reporting of pT, pN, and (when applicable) pM categories is based on information available to the pathologist at the time the report is issued. As per the AJCC (Chapter 1, 8th Ed.) it is the managing physician’s responsibility to establish the final pathologic stage based upon all pertinent information, including but potentially not limited to this pathology report.        pT Category  pT2   pN Category  pN0a   ADDITIONAL FINDINGS   Additional Findings  None identified     DM   On  metformin, farxiga, ozempic 1 mg/week     Lab Results   Component Value Date    A1C 6.0 (H) 07/30/2024    A1C 7.9 (H) 10/28/2023    A1C 7.5 (H) 12/17/2019 07/30/24 11:27   CREATININE UR RANDOM mg/dL 71.30   MALB URINE mg/dL 0.70   MALB/CRE CALC <=30.0 ug/mg 9.8     7/30/2024 TSH 0.5, FT4 1.4, Tg 18, TgAb<1  8/8/2024 rt thyroid bed residual is smaller now     HTN on Losrtan     DLP On zetia   She did not tolerate statin- forgetfulness. Does not want to try them again.        Low vit D3   Low b12         ASSESSMENT AND PLAN:  43 year old woman with PTC stage I T2 N0 M0, s/p CURRY 106 and post tx scan no distant mets.  post op hypothyroid, DM, obese, DLP, statin intolerance, low b12 and low vit D    10/2023 pathology showed 2.5 cm PTC Rt side, no ETE/vascular/lymphatic/perineural invasion, negative margins, 0/1 LN VI,      Her initial recurrence risk per PRAKASH was ~ 5% based on pathology alone. Her post op w/u showed high Tg levels ( stimulated 143 and non-stimulated 8.9)    3/2024 US thyroid also showed some possible BL thyroid bed residual.      6/10/2024  CURRY treatment 106.9 mCi I-131   6/17/2024 Post tx scan There is radiotracer accumulation within the thyroid bed bilaterally which could reflect recurrent or residual neoplasm and/or residual thyroid tissue.  No definitive distant metastatic disease.        Clinically, euthyroid but has anxiety so will lower the dose.   Biochemically,   7/30/2024 TSH 0.5, FT4 1.4, Tg 18, TgAb<1  8/8/2024 rt thyroid bed residual is smaller now               Plan     Labs and RTC in  9/2024    US 2/2025     Thyroid medication dose: Synthroid 125 mcg /day. Take 1/2 tab on Sunday   Take you medication on empty stomach, not with any other medication or food. Wait 60 minutes before eating. Wait 4 hours before taking Vitamins, Calcium or iron. On the morning of the lab test, please take the medication after the blood test not before. Do not take Biotin 1 week before blood test.      Did  not tolerate atorvastatin and does not want to try them again    Ozempic 1 mg weekly  Metformin   Farxiga  Zetia  Losartan    B12 - OTC    vit D daily - OTC        PAST MEDICAL HISTORY:   Past Medical History:    Diabetes (HCC)    Disorder of thyroid    High blood pressure    High cholesterol    History of COVID-19    Had flu like S/S x 2-3 days.    Hypothyroidism    Neuropathy    feet    PONV (postoperative nausea and vomiting)    Visual impairment    glasses       PAST SURGICAL HISTORY:   Past Surgical History:   Procedure Laterality Date      2002      06/15/2005      2007      10/17/2013   10/2023 thyroidectomy      CURRENT MEDICATIONS:    Current Outpatient Medications   Medication Sig Dispense Refill    FARXIGA 10 MG Oral Tab Take 1 tablet (10 mg total) by mouth daily. Can hold until diet improves 30 tablet 5    semaglutide (OZEMPIC, 1 MG/DOSE,) 4 MG/3ML Subcutaneous Solution Pen-injector Inject 1 mg into the skin once a week. 9 mL 1    metFORMIN  MG Oral Tablet 24 Hr Take 1 tablet (500 mg total) by mouth 2 (two) times daily with meals. Can hold until diet improves 180 tablet 0    losartan 25 MG Oral Tab Take 1 tablet (25 mg total) by mouth at bedtime. 90 tablet 0    LEVOTHYROXINE 125 MCG Oral Tab Take 1 tablet (125 mcg total) by mouth before breakfast. Synthroid brand name 90 tablet 1    ezetimibe 10 MG Oral Tab Take 1 tablet (10 mg total) by mouth nightly.      Sodium Fluoride (PREVIDENT 5000 DRY MOUTH) 1.1 % Dental Gel Place 1 each onto teeth daily. 1 each 1    Cevimeline HCl 30 MG Oral Cap Take 1 capsule (30 mg total) by mouth daily. 90 capsule 0    Xylitol (XYLIMELTS) 500 MG Mouth/Throat Disk Use as directed 1 each in the mouth or throat daily. 90 each 0    ondansetron (ZOFRAN) 4 mg tablet Take 1 tablet (4 mg total) by mouth every 8 (eight) hours as needed for Nausea. (Patient not taking: Reported on 2024) 12 tablet 0    Continuous Glucose Sensor  (DEXCOM G7 SENSOR) Does not apply Misc 1 each Every 10 days. Use as directed every 10 days 3 each 11    LORazepam 0.5 MG Oral Tab Take 0.5-1 tablets (0.25-0.5 mg total) by mouth. (Patient not taking: Reported on 8/20/2024)      ergocalciferol 1.25 MG (75551 UT) Oral Cap Take 1 capsule (50,000 Units total) by mouth twice a week.      oxyCODONE 5 MG Oral Tab Take 1 tablet (5 mg total) by mouth every 4 (four) hours as needed. 20 tablet 0     No medication comments found.   FARXIGA 10 MG Oral Tab Take 1 tablet (10 mg total) by mouth daily. Can hold until diet improves 30 tablet 5    semaglutide (OZEMPIC, 1 MG/DOSE,) 4 MG/3ML Subcutaneous Solution Pen-injector Inject 1 mg into the skin once a week. 9 mL 1    metFORMIN  MG Oral Tablet 24 Hr Take 1 tablet (500 mg total) by mouth 2 (two) times daily with meals. Can hold until diet improves 180 tablet 0    losartan 25 MG Oral Tab Take 1 tablet (25 mg total) by mouth at bedtime. 90 tablet 0    LEVOTHYROXINE 125 MCG Oral Tab Take 1 tablet (125 mcg total) by mouth before breakfast. Synthroid brand name 90 tablet 1    ezetimibe 10 MG Oral Tab Take 1 tablet (10 mg total) by mouth nightly.          ALLERGIES:  Allergies   Allergen Reactions    Penicillins HIVES       SOCIAL HISTORY:    Social History     Socioeconomic History    Marital status:    Tobacco Use    Smoking status: Never    Smokeless tobacco: Never   Vaping Use    Vaping status: Never Used   Substance and Sexual Activity    Alcohol use: Not Currently    Drug use: Never   Other Topics Concern    Caffeine Concern Yes     Comment: 1-2 cups a day    Special Diet Yes     Comment: 2-3 x week       FAMILY HISTORY:   Family History   Problem Relation Age of Onset    Diabetes Neg              PHYSICAL EXAM:   Height: 4' 11\" (149.9 cm) (08/20 1037)  Weight: 156 lb 9.6 oz (71 kg) (08/20 1037)  BSA (Calculated - sq m): 1.66 sq meters (08/20 1037)  Pulse: 92 (08/20 1037)  BP: 96/60 (08/20 1037)  Temp: --  Do Not Use -  Resp Rate: --  SpO2: 98 % (08/20 1037)    Body mass index is 31.63 kg/m².    Obese   Well healed wound at the base of the neck   No LAP on exam      CONSTITUTIONAL:  Awake and alert. Age appropriate, good hygiene not in acute distress. Well nourished and well developed. no acute distress   PSYCH:   Orientated to time, place, person & situation, Normal mood and affect, memory intact, normal insight and judgment, cooperative  Neuro: speech is clear. Awake, alert, no aphasia, no facial asymmetry, no nuchal rigidity  EYES:  No proptosis, no ptosis, conjunctiva normal  ENT:  Normocephalic, atraumatic  Eye: EOMI, normal lids, no discharge, no conjunctival erythema. No exophthalmos/proptosis, Ptosis negative   No rhinorrhea, moist oral mucosa  Neck: full range of motion  Neck/Thyroid: neck inspection:  + scar,       DATA:   Pertinent data reviewed      08/08/24 11:18   US HEAD/NECK (CPT=76536) Patient is status post thyroidectomy.   There is a 1.3 x 0.9 x 0.8 cm hypoechoic structure within the right thyroidectomy bed, stable to minimally decreased in size from prior.   Within the right upper lateral neck there is a 1.9 x 0.9 x 1.4 cm lymph node which demonstrates a fatty hilum and thin cortex.  Within the right submandibular region there is a 1.6 x 0.6 x 1.3 cm lymph node with fatty hilum and thin cortex.  Within the   left submandibular region there is a 1.2 x 0.5 x 1.0 cm lymph node with fatty hilum and thin cortex.   The previously identified lesion in the left thyroidectomy bed is not identified on the current exam    Rpt: View report in Results Review for more information      Latest Reference Range & Units 12/08/23 12:40 02/20/24 10:46   Thyroglob Ab 0.0 - 0.9 IU/mL <1.0 <1.0   Thyroglob LIA 1.5 - 38.5 ng/mL 143.4 (H) 8.9   (H): Data is abnormally high   Latest Reference Range & Units 07/30/24 11:27   Glucose 70 - 99 mg/dL 114 (H)   HEMOGLOBIN A1c <5.7 % 6.0 (H)   ESTIMATED AVERAGE GLUCOSE 68 - 126 mg/dL 126   Sodium  136 - 145 mmol/L 136   Potassium 3.5 - 5.1 mmol/L 4.0   Chloride 98 - 112 mmol/L 106   Carbon Dioxide, Total 21.0 - 32.0 mmol/L 25.0   BUN 9 - 23 mg/dL 13   CREATININE 0.55 - 1.02 mg/dL 0.47 (L)   CALCIUM 8.7 - 10.4 mg/dL 9.3   EGFR >=60 mL/min/1.73m2 121   ANION GAP 0 - 18 mmol/L 5   CALCULATED OSMOLALITY 275 - 295 mOsm/kg 283   ALKALINE PHOSPHATASE 37 - 98 U/L 85   AST (SGOT) <34 U/L 38 (H)   ALT (SGPT) 10 - 49 U/L 53 (H)   Total Bilirubin 0.3 - 1.2 mg/dL 0.9   Globulin 2.0 - 3.5 g/dL 2.5   Magnesium, Serum 1.6 - 2.6 mg/dL 2.1   (   Latest Reference Range & Units 07/30/24 11:27   T4,Free (Direct) 0.8 - 1.7 ng/dL 1.4   TSH 0.550 - 4.780 mIU/mL 0.552      Latest Reference Range & Units 07/30/24 11:27   CREATININE UR RANDOM mg/dL 71.30   MALB URINE mg/dL 0.70   MALB/CRE CALC <=30.0 ug/mg 9.8      Latest Reference Range & Units 07/30/24 11:27   Thyroglob Ab 0.0 - 0.9 IU/mL <1.0   Thyroglobulin, LIA 1.5 - 38.5 ng/mL 18.8       No results for input(s): \"TSH\", \"T4F\", \"T3F\", \"THYP\" in the last 72 hours.  No results found.        Orders Placed This Encounter   Procedures    TSH W Reflex To Free T4    Thyroglobulin Antibody and Tumor Marker     Orders Placed This Encounter    TSH W Reflex To Free T4     Standing Status:   Future     Standing Expiration Date:   8/20/2025    Thyroglobulin Antibody and Tumor Marker     Standing Status:   Future     Standing Expiration Date:   8/20/2025     Order Specific Question:   Release to patient     Answer:   Immediate    FARXIGA 10 MG Oral Tab     Sig: Take 1 tablet (10 mg total) by mouth daily. Can hold until diet improves     Dispense:  30 tablet     Refill:  5    semaglutide (OZEMPIC, 1 MG/DOSE,) 4 MG/3ML Subcutaneous Solution Pen-injector     Sig: Inject 1 mg into the skin once a week.     Dispense:  9 mL     Refill:  1    metFORMIN  MG Oral Tablet 24 Hr     Sig: Take 1 tablet (500 mg total) by mouth 2 (two) times daily with meals. Can hold until diet improves     Dispense:   180 tablet     Refill:  0    losartan 25 MG Oral Tab     Sig: Take 1 tablet (25 mg total) by mouth at bedtime.     Dispense:  90 tablet     Refill:  0    LEVOTHYROXINE 125 MCG Oral Tab     Sig: Take 1 tablet (125 mcg total) by mouth before breakfast. Synthroid brand name     Dispense:  90 tablet     Refill:  1          This is a specialized patient consultation in endocrinology and required comprehensive review of prior records, as well as current evaluation, with time required for consideration of complex endocrine issues and consultation. For this visit, I personally interviewed the patient, and family member if accompanied, performed the pertinent parts of the history and physical examination. ROS included screening for appropriate endocrine conditions.   Today's diagnosis and plan were reviewed in detail with the patient who states understanding and agrees with plan. I discussed with the patient possible diagnosis, differential diagnosis, need for work up , treatment options, alternatives and side effects.     Please see note for details about time spent which includes:   · pre-visit preparation  · reviewing records  · face to face time with the patient   · timely documentation of the encounter  · ordering medications/tests  · communication with care team  · care coordination    I appreciate the opportunity to be part of your patient's medical care and will keep you, as the referring and primary physicians, informed about the care of your patient, including possible future surgery and pathology findings. Please feel free to contact me should you have any questions.       Leigh Taylor MD

## 2024-09-24 ENCOUNTER — LAB ENCOUNTER (OUTPATIENT)
Dept: LAB | Age: 44
End: 2024-09-24
Attending: INTERNAL MEDICINE
Payer: COMMERCIAL

## 2024-09-24 ENCOUNTER — TELEPHONE (OUTPATIENT)
Dept: ENDOCRINOLOGY CLINIC | Facility: CLINIC | Age: 44
End: 2024-09-24

## 2024-09-24 ENCOUNTER — OFFICE VISIT (OUTPATIENT)
Facility: LOCATION | Age: 44
End: 2024-09-24

## 2024-09-24 VITALS
SYSTOLIC BLOOD PRESSURE: 110 MMHG | DIASTOLIC BLOOD PRESSURE: 80 MMHG | HEART RATE: 110 BPM | WEIGHT: 157 LBS | BODY MASS INDEX: 32 KG/M2

## 2024-09-24 DIAGNOSIS — E78.5 DYSLIPIDEMIA: ICD-10-CM

## 2024-09-24 DIAGNOSIS — R73.09 HIGH GLUCOSE LEVEL: ICD-10-CM

## 2024-09-24 DIAGNOSIS — E07.9 THYROID DISEASE: ICD-10-CM

## 2024-09-24 DIAGNOSIS — E89.0 S/P COMPLETE THYROIDECTOMY: ICD-10-CM

## 2024-09-24 DIAGNOSIS — E11.65 UNCONTROLLED TYPE 2 DIABETES MELLITUS WITH HYPERGLYCEMIA (HCC): ICD-10-CM

## 2024-09-24 DIAGNOSIS — I10 PRIMARY HYPERTENSION: ICD-10-CM

## 2024-09-24 DIAGNOSIS — C73 THYROID CANCER (HCC): ICD-10-CM

## 2024-09-24 DIAGNOSIS — R68.2 DRY MOUTH: ICD-10-CM

## 2024-09-24 DIAGNOSIS — E11.21 DIABETIC NEPHROPATHY ASSOCIATED WITH TYPE 2 DIABETES MELLITUS (HCC): ICD-10-CM

## 2024-09-24 DIAGNOSIS — E53.8 LOW SERUM VITAMIN B12: ICD-10-CM

## 2024-09-24 DIAGNOSIS — E78.2 MIXED HYPERLIPIDEMIA: ICD-10-CM

## 2024-09-24 DIAGNOSIS — E11.42 DIABETIC PERIPHERAL NEUROPATHY (HCC): Primary | ICD-10-CM

## 2024-09-24 DIAGNOSIS — E89.0 HYPOTHYROIDISM, POSTSURGICAL: ICD-10-CM

## 2024-09-24 DIAGNOSIS — R74.01 HIGH LIVER TRANSAMINASE LEVEL: ICD-10-CM

## 2024-09-24 DIAGNOSIS — R79.89 LOW VITAMIN D LEVEL: ICD-10-CM

## 2024-09-24 DIAGNOSIS — E11.42 DIABETIC PERIPHERAL NEUROPATHY (HCC): ICD-10-CM

## 2024-09-24 LAB
GLUCOSE BLOOD: 96
T3FREE SERPL-MCNC: 2.56 PG/ML (ref 2.4–4.2)
T4 FREE SERPL-MCNC: 1.6 NG/DL (ref 0.8–1.7)
TEST STRIP LOT #: NORMAL NUMERIC
TSI SER-ACNC: 0.51 MIU/ML (ref 0.55–4.78)

## 2024-09-24 PROCEDURE — 86800 THYROGLOBULIN ANTIBODY: CPT

## 2024-09-24 PROCEDURE — 36415 COLL VENOUS BLD VENIPUNCTURE: CPT

## 2024-09-24 PROCEDURE — 84432 ASSAY OF THYROGLOBULIN: CPT

## 2024-09-24 PROCEDURE — 84481 FREE ASSAY (FT-3): CPT

## 2024-09-24 PROCEDURE — 84443 ASSAY THYROID STIM HORMONE: CPT

## 2024-09-24 PROCEDURE — 82947 ASSAY GLUCOSE BLOOD QUANT: CPT | Performed by: INTERNAL MEDICINE

## 2024-09-24 PROCEDURE — 84439 ASSAY OF FREE THYROXINE: CPT

## 2024-09-24 PROCEDURE — 99214 OFFICE O/P EST MOD 30 MIN: CPT | Performed by: INTERNAL MEDICINE

## 2024-09-24 RX ORDER — LEVOTHYROXINE SODIUM 125 MCG
125 TABLET ORAL
Qty: 90 TABLET | Refills: 1 | Status: SHIPPED | OUTPATIENT
Start: 2024-09-24

## 2024-09-24 RX ORDER — PANTOPRAZOLE SODIUM 40 MG/1
40 TABLET, DELAYED RELEASE ORAL
Qty: 30 TABLET | Refills: 0 | Status: SHIPPED | OUTPATIENT
Start: 2024-09-24

## 2024-09-24 RX ORDER — XYLITOL 500 MG
1 MUCO-ADHESIVE BUCCAL TABLET MUCOUS MEMBRANE DAILY
Qty: 90 EACH | Refills: 0 | Status: SHIPPED | OUTPATIENT
Start: 2024-09-24

## 2024-09-24 NOTE — PATIENT INSTRUCTIONS
Will see dentist soon   Will give acid reflux meds   Take a break from farxiga for a week to assess if making dry mouth   Labs now   Labs and RTC in  11/2024  Ozempic 1 mg weekly  Metformin   Farxiga  Zetia  Losartan    B12 - OTC    vit D daily - OTC     2/2025     Thyroid medication dose: Synthroid 125 mcg /day. Take 1/2 tab on Sunday   Take you medication on empty stomach, not with any other medication or food. Wait 60 minutes before eating. Wait 4 hours before taking Vitamins, Calcium or iron. On the morning of the lab test, please take the medication after the blood test not before. Do not take Biotin 1 week before blood test.

## 2024-09-24 NOTE — PROGRESS NOTES
Called [facility] at [phone] transferred care leanna at [phone]    Per rep no exception needed, to submit overwrite transferred to  [phone]    Insurance plan does not allow for duplicate therapy overwrite. Patient due for a refill on 10/24 for synthroid 125 mcg.       Patient does not qualify for copay card due to not having commercial insurance but can apply to \"synthroid delivers\" program. Called synthroid  service and confirmed by rep that program does not have a contract and can cancel anytime.     For synthroid delivers patient can enroll at [phone]      LV for patient to call back.      Correct Member ID #[id number] * Needs to update ID

## 2024-09-24 NOTE — TELEPHONE ENCOUNTER
Spoke to patient and provided with number for synthroid delivers program #[phone]     Patient to call and find out if she is able to apply to program for synthroid

## 2024-09-24 NOTE — PROGRESS NOTES
Reason for Visit:  thyroid cancer, hypothyroid, DM, obese, DLP, statin intolerance  Requesting Physician:   ..HARSH MALHOTRA      CHIEF COMPLAINT:    Chief Complaint   Patient presents with    Diabetes    Thyroid Problem        HISTORY OF PRESENT ILLNESS:   Paula Hugo is a 43 year old female who presents with thyroid cancer, PTC stage I, hypothyroid, DM, obese, DLP, statin intolerance and low vit D    Has heat intolerance   C/o dysphagia and voice changes but intermittent and associated with GERD sx and earaches  Has dry mouth since CURRY          Now on  Synthroid 125 mcg 6.5/wk.    We discussed labs and US   We reviewed images and discussed findings. Still has Rt thyroid bed residual. Tg levels still high.        Thyroid cancer history   9/11/2023 Right mid thyroid nodule, FNA - PTC ([facility])  10/26/2023 Bilateral Total Thyroidectomy  by Justin Can MD      12/2023 US   . There is soft tissue mass in area of right thyroidectomy which has suspicious imaging appearance on ultrasound and should be further evaluated with CT or I 131 imaging study.   . Findings in left neck in thyroid region are probably granulation tissue but nonspecific.   . Mildly enlarged left submandibular and node is noted.  This does maintain normal lymph node architecture although should be correlated clinically.     12/8/2023 TSH >100, Tg 143, neg TgAb  12/26/023   on 50 mcg Synthroid  1/11/2024 TSH 3.1  1/16/2024 TSH 1.39 125 mcg Synthroid    12/2023 US post op month #2 when her TSH was > 100 which showed possible Rt thyroid bed residual.   2/2024 TSH 0.059  FT41.7  Tg 8.9 TgAb <1.0   3/28/2024 US right thyroid bed is a hypoechoic, 0.9 x 0.9 x 1.6 versus 1 x 1.1 x 1.7 cm nodule.  Within the left thyroid bed best seen on trans imaging is a stable 4 x 5 versus 4 x 6 mm echogenic area.   4/2/2024 TSH 0.8 , Tg 13.9, TgAb <1    6/10/2024  CURRY treatment 106.9 mCi I-131   6/17/2024 Post tx scan There is radiotracer accumulation  within the thyroid bed bilaterally which could reflect recurrent or residual neoplasm and/or residual thyroid tissue.  No definitive distant metastatic disease.   7/30/2024 TSH 0.5, FT4 1.4, Tg 18, TgAb<1  8/8/2024 rt thyroid bed residual is smaller now   8/2024 head/neck US showed  1.3 x 0.9 x 0.8 cm hypoechoic structure within the right thyroidectomy bed, stable to minimally decreased in size from prior.   Within the right upper lateral neck there is a 1.9 x 0.9 x 1.4 cm lymph node which demonstrates a fatty hilum and thin cortex. Within the right submandibular region there is a 1.6 x 0.6 x 1.3 cm lymph node with fatty hilum and thin cortex.  Within the   left submandibular region there is a 1.2 x 0.5 x 1.0 cm lymph node with fatty hilum and thin cortex.   The previously identified lesion in the left thyroidectomy bed is not identified on the current exam.  Attention on follow-up is recommended.     7/2024 TSH 0.5  9/2024 TSH 0.5      Final Diagnosis:   Thyroid gland:  -Papillary thyroid carcinoma, right mid to lower lobe (2.5 cm in greatest dimension) with calcification.  -Tumor is confined to the thyroid parenchyma.  -Margins of resection are free of tumor.  -Single right perithyroidal lymph node, negative for metastatic carcinoma.           SPECIMEN   Procedure  Total thyroidectomy   TUMOR   Tumor Focality  Unifocal   Tumor Characteristics     Tumor Site  Right lobe   Tumor Size  Greatest Dimension (Centimeters): 2.5 cm   Histologic Tumor Types and Subtypes  Papillary thyroid carcinoma   Tumor Proliferative Activity     Mitotic Rate  Less than 3 mitoses per 2mm2   Tumor Necrosis  Not identified   Angioinvasion (vascular invasion)  Not identified   Lymphatic Invasion  Not identified   Perineural Invasion  Not identified   Extrathyroidal Extension  Not identified   Margin Status  All margins negative for carcinoma   Distance from Invasive Carcinoma to Closest Margin  Less than 1 mm   REGIONAL LYMPH NODES    Regional Lymph Node Status  All regional lymph nodes negative for tumor   Number of Lymph Nodes Examined  1   Matt Level(s) Examined  Level VI   pTNM CLASSIFICATION (AJCC 8th Edition)   Reporting of pT, pN, and (when applicable) pM categories is based on information available to the pathologist at the time the report is issued. As per the AJCC (Chapter 1, 8th Ed.) it is the managing physician’s responsibility to establish the final pathologic stage based upon all pertinent information, including but potentially not limited to this pathology report.        pT Category  pT2   pN Category  pN0a   ADDITIONAL FINDINGS   Additional Findings  None identified     DM   On metformin, farxiga, ozempic 1 mg/week     Lab Results   Component Value Date    A1C 6.0 (H) 07/30/2024    A1C 7.9 (H) 10/28/2023    A1C 7.5 (H) 12/17/2019 07/30/24 11:27   CREATININE UR RANDOM mg/dL 71.30   MALB URINE mg/dL 0.70   MALB/CRE CALC <=30.0 ug/mg 9.8       HTN on Losrtan     DLP On zetia   She did not tolerate statin- forgetfulness. Does not want to try them again.        Low vit D3   Low b12         ASSESSMENT AND PLAN:  43 year old woman with PTC stage I T2 N0 M0, s/p CURRY 106 and post tx scan no distant mets.  post op hypothyroid, DM, obese, DLP, statin intolerance, low b12 and low vit D    10/2023 pathology showed 2.5 cm PTC Rt side, no ETE/vascular/lymphatic/perineural invasion, negative margins, 0/1 LN VI,      Her initial recurrence risk per PRAKASH was ~ 5% based on pathology alone. Her post op w/u showed high Tg levels ( stimulated 143 and non-stimulated 8.9)    3/2024 US thyroid also showed some possible BL thyroid bed residual.      6/10/2024  CURRY treatment 106.9 mCi I-131   6/17/2024 Post tx scan There is radiotracer accumulation within the thyroid bed bilaterally which could reflect recurrent or residual neoplasm and/or residual thyroid tissue.  No definitive distant metastatic disease.        Clinically, euthyroid     Biochemically,   2024 TSH 0.5, FT4 1.4, Tg 18, TgAb<1  2024 rt thyroid bed residual is smaller now               Plan   Will see dentist soon   Will give acid reflux meds   Take a break from farxiga for a week to assess if making dry mouth   Labs now   Labs and RTC in  2024  Ozempic 1 mg weekly  Metformin   Farxiga  Zetia  Losartan    B12 - OTC    vit D daily - OTC     2025     Thyroid medication dose: Synthroid 125 mcg /day. Take 1/2 tab on    Take you medication on empty stomach, not with any other medication or food. Wait 60 minutes before eating. Wait 4 hours before taking Vitamins, Calcium or iron. On the morning of the lab test, please take the medication after the blood test not before. Do not take Biotin 1 week before blood test.      Did not tolerate atorvastatin and does not want to try them again        PAST MEDICAL HISTORY:   Past Medical History:    Diabetes (HCC)    Disorder of thyroid    High blood pressure    High cholesterol    History of COVID-19    Had flu like S/S x 2-3 days.    Hypothyroidism    Neuropathy    feet    PONV (postoperative nausea and vomiting)    Visual impairment    glasses       PAST SURGICAL HISTORY:   Past Surgical History:   Procedure Laterality Date      2002      06/15/2005      2007      10/17/2013   10/2023 thyroidectomy      CURRENT MEDICATIONS:    Current Outpatient Medications   Medication Sig Dispense Refill    SYNTHROID 125 MCG Oral Tab Take 1 tablet (125 mcg total) by mouth before breakfast. Synthroid brand name only 90 tablet 1    Xylitol (XYLIMELTS) 500 MG Mouth/Throat Disk Use as directed 1 each in the mouth or throat daily. 90 each 0    pantoprazole 40 MG Oral Tab EC Take 1 tablet (40 mg total) by mouth every morning before breakfast. 30 tablet 0    FARXIGA 10 MG Oral Tab Take 1 tablet (10 mg total) by mouth daily. Can hold until diet improves 30 tablet 5    semaglutide (OZEMPIC, 1 MG/DOSE,)  4 MG/3ML Subcutaneous Solution Pen-injector Inject 1 mg into the skin once a week. 9 mL 1    metFORMIN  MG Oral Tablet 24 Hr Take 1 tablet (500 mg total) by mouth 2 (two) times daily with meals. Can hold until diet improves 180 tablet 0    losartan 25 MG Oral Tab Take 1 tablet (25 mg total) by mouth at bedtime. 90 tablet 0    LEVOTHYROXINE 125 MCG Oral Tab Take 1 tablet (125 mcg total) by mouth before breakfast. Synthroid brand name 90 tablet 1    Sodium Fluoride (PREVIDENT 5000 DRY MOUTH) 1.1 % Dental Gel Place 1 each onto teeth daily. 1 each 1    Cevimeline HCl 30 MG Oral Cap Take 1 capsule (30 mg total) by mouth daily. 90 capsule 0    Continuous Glucose Sensor (DEXCOM G7 SENSOR) Does not apply Misc 1 each Every 10 days. Use as directed every 10 days 3 each 11    ezetimibe 10 MG Oral Tab Take 1 tablet (10 mg total) by mouth nightly.      ondansetron (ZOFRAN) 4 mg tablet Take 1 tablet (4 mg total) by mouth every 8 (eight) hours as needed for Nausea. (Patient not taking: Reported on 8/20/2024) 12 tablet 0    LORazepam 0.5 MG Oral Tab Take 0.5-1 tablets (0.25-0.5 mg total) by mouth. (Patient not taking: Reported on 8/20/2024)      ergocalciferol 1.25 MG (00700 UT) Oral Cap Take 1 capsule (50,000 Units total) by mouth twice a week.      oxyCODONE 5 MG Oral Tab Take 1 tablet (5 mg total) by mouth every 4 (four) hours as needed. 20 tablet 0     No medication comments found.   SYNTHROID 125 MCG Oral Tab Take 1 tablet (125 mcg total) by mouth before breakfast. Synthroid brand name only 90 tablet 1    Xylitol (XYLIMELTS) 500 MG Mouth/Throat Disk Use as directed 1 each in the mouth or throat daily. 90 each 0    pantoprazole 40 MG Oral Tab EC Take 1 tablet (40 mg total) by mouth every morning before breakfast. 30 tablet 0    FARXIGA 10 MG Oral Tab Take 1 tablet (10 mg total) by mouth daily. Can hold until diet improves 30 tablet 5    semaglutide (OZEMPIC, 1 MG/DOSE,) 4 MG/3ML Subcutaneous Solution Pen-injector Inject 1  mg into the skin once a week. 9 mL 1    metFORMIN  MG Oral Tablet 24 Hr Take 1 tablet (500 mg total) by mouth 2 (two) times daily with meals. Can hold until diet improves 180 tablet 0    losartan 25 MG Oral Tab Take 1 tablet (25 mg total) by mouth at bedtime. 90 tablet 0    LEVOTHYROXINE 125 MCG Oral Tab Take 1 tablet (125 mcg total) by mouth before breakfast. Synthroid brand name 90 tablet 1    Sodium Fluoride (PREVIDENT 5000 DRY MOUTH) 1.1 % Dental Gel Place 1 each onto teeth daily. 1 each 1    Cevimeline HCl 30 MG Oral Cap Take 1 capsule (30 mg total) by mouth daily. 90 capsule 0    Continuous Glucose Sensor (DEXCOM G7 SENSOR) Does not apply Misc 1 each Every 10 days. Use as directed every 10 days 3 each 11    ezetimibe 10 MG Oral Tab Take 1 tablet (10 mg total) by mouth nightly.          ALLERGIES:  Allergies   Allergen Reactions    Penicillins HIVES       SOCIAL HISTORY:    Social History     Socioeconomic History    Marital status:    Tobacco Use    Smoking status: Never    Smokeless tobacco: Never   Vaping Use    Vaping status: Never Used   Substance and Sexual Activity    Alcohol use: Not Currently    Drug use: Never   Other Topics Concern    Caffeine Concern Yes     Comment: 1-2 cups a day    Special Diet Yes     Comment: 2-3 x week       FAMILY HISTORY:   Family History   Problem Relation Age of Onset    Diabetes Neg              PHYSICAL EXAM:   Height: --  Weight: 157 lb (71.2 kg) (09/24 1005)  BSA (Calculated - sq m): --  Pulse: 110 (09/24 1005)  BP: 110/80 (09/24 1005)  Temp: --  Do Not Use - Resp Rate: --  SpO2: --    Body mass index is 31.71 kg/m².    Obese   Well healed wound at the base of the neck   No LAP on exam      CONSTITUTIONAL:  Awake and alert. Age appropriate, good hygiene not in acute distress. Well nourished and well developed. no acute distress   PSYCH:   Orientated to time, place, person & situation, Normal mood and affect, memory intact, normal insight and judgment,  cooperative  Neuro: speech is clear. Awake, alert, no aphasia, no facial asymmetry, no nuchal rigidity  EYES:  No proptosis, no ptosis, conjunctiva normal  ENT:  Normocephalic, atraumatic  Eye: EOMI, normal lids, no discharge, no conjunctival erythema. No exophthalmos/proptosis, Ptosis negative   No rhinorrhea, moist oral mucosa  Neck: full range of motion  Neck/Thyroid: neck inspection:  + scar,       DATA:   Pertinent data reviewed      08/08/24 11:18   US HEAD/NECK (CPT=76536) Patient is status post thyroidectomy.   There is a 1.3 x 0.9 x 0.8 cm hypoechoic structure within the right thyroidectomy bed, stable to minimally decreased in size from prior.   Within the right upper lateral neck there is a 1.9 x 0.9 x 1.4 cm lymph node which demonstrates a fatty hilum and thin cortex.  Within the right submandibular region there is a 1.6 x 0.6 x 1.3 cm lymph node with fatty hilum and thin cortex.  Within the   left submandibular region there is a 1.2 x 0.5 x 1.0 cm lymph node with fatty hilum and thin cortex.   The previously identified lesion in the left thyroidectomy bed is not identified on the current exam    Rpt: View report in Results Review for more information      Latest Reference Range & Units 12/08/23 12:40 02/20/24 10:46   Thyroglob Ab 0.0 - 0.9 IU/mL <1.0 <1.0   Thyroglob LIA 1.5 - 38.5 ng/mL 143.4 (H) 8.9   (H): Data is abnormally high   Latest Reference Range & Units 07/30/24 11:27   Glucose 70 - 99 mg/dL 114 (H)   HEMOGLOBIN A1c <5.7 % 6.0 (H)   ESTIMATED AVERAGE GLUCOSE 68 - 126 mg/dL 126   Sodium 136 - 145 mmol/L 136   Potassium 3.5 - 5.1 mmol/L 4.0   Chloride 98 - 112 mmol/L 106   Carbon Dioxide, Total 21.0 - 32.0 mmol/L 25.0   BUN 9 - 23 mg/dL 13   CREATININE 0.55 - 1.02 mg/dL 0.47 (L)   CALCIUM 8.7 - 10.4 mg/dL 9.3   EGFR >=60 mL/min/1.73m2 121   ANION GAP 0 - 18 mmol/L 5   CALCULATED OSMOLALITY 275 - 295 mOsm/kg 283   ALKALINE PHOSPHATASE 37 - 98 U/L 85   AST (SGOT) <34 U/L 38 (H)   ALT (SGPT) 10 -  49 U/L 53 (H)   Total Bilirubin 0.3 - 1.2 mg/dL 0.9   Globulin 2.0 - 3.5 g/dL 2.5   Magnesium, Serum 1.6 - 2.6 mg/dL 2.1   (   Latest Reference Range & Units 07/30/24 11:27   T4,Free (Direct) 0.8 - 1.7 ng/dL 1.4   TSH 0.550 - 4.780 mIU/mL 0.552      Latest Reference Range & Units 07/30/24 11:27   CREATININE UR RANDOM mg/dL 71.30   MALB URINE mg/dL 0.70   MALB/CRE CALC <=30.0 ug/mg 9.8      Latest Reference Range & Units 07/30/24 11:27   Thyroglob Ab 0.0 - 0.9 IU/mL <1.0   Thyroglobulin, LIA 1.5 - 38.5 ng/mL 18.8       Recent Labs     09/24/24  1058   TSH 0.507*   T4F 1.6   T3F 2.56     No results found.    TSH W Reflex To Free T4        Component Value Flag Ref Range Units Status    TSH 0.507      0.550 - 4.780 mIU/mL Final                  T4, FREE (S)        Component Value Flag Ref Range Units Status    Free T4 1.6      0.8 - 1.7 ng/dL Final                  Free T3 (Triiodothryronine)        Component Value Flag Ref Range Units Status    T3 Free 2.56      2.40 - 4.20 pg/mL Final                  POC HemoCue Glucose 201 (Finger stick glucose)        Component Value Flag Ref Range Units Status    GLUCOSE BLOOD 96        Final    Test Strip Lot # [id number]       Numeric Final    Test Strip Expiration Date 1/20/25       Date Final                    Orders Placed This Encounter   Procedures    POC HemoCue Glucose 201 (Finger stick glucose)    Thyroglobulin Antibody and Tumor Marker    TSH W Reflex To Free T4    TSH W Reflex To Free T4     Orders Placed This Encounter    POC HemoCue Glucose 201 (Finger stick glucose)     Order Specific Question:   Release to patient     Answer:   Immediate    Thyroglobulin Antibody and Tumor Marker     Standing Status:   Future     Standing Expiration Date:   9/24/2025     Order Specific Question:   Release to patient     Answer:   Immediate    TSH W Reflex To Free T4     Standing Status:   Future     Standing Expiration Date:   9/24/2025    TSH W Reflex To Free T4     Standing  Status:   Future     Number of Occurrences:   1     Standing Expiration Date:   9/24/2025     Order Specific Question:   Release to patient     Answer:   Immediate    SYNTHROID 125 MCG Oral Tab     Sig: Take 1 tablet (125 mcg total) by mouth before breakfast. Synthroid brand name only     Dispense:  90 tablet     Refill:  1     Please do not substitute for generic.    Xylitol (XYLIMELTS) 500 MG Mouth/Throat Disk     Sig: Use as directed 1 each in the mouth or throat daily.     Dispense:  90 each     Refill:  0    pantoprazole 40 MG Oral Tab EC     Sig: Take 1 tablet (40 mg total) by mouth every morning before breakfast.     Dispense:  30 tablet     Refill:  0          This is a specialized patient consultation in endocrinology and required comprehensive review of prior records, as well as current evaluation, with time required for consideration of complex endocrine issues and consultation. For this visit, I personally interviewed the patient, and family member if accompanied, performed the pertinent parts of the history and physical examination. ROS included screening for appropriate endocrine conditions.   Today's diagnosis and plan were reviewed in detail with the patient who states understanding and agrees with plan. I discussed with the patient possible diagnosis, differential diagnosis, need for work up , treatment options, alternatives and side effects.     Please see note for details about time spent which includes:   · pre-visit preparation  · reviewing records  · face to face time with the patient   · timely documentation of the encounter  · ordering medications/tests  · communication with care team  · care coordination    I appreciate the opportunity to be part of your patient's medical care and will keep you, as the referring and primary physicians, informed about the care of your patient, including possible future surgery and pathology findings. Please feel free to contact me should you have any  questions.       Leigh Taylor MD

## 2024-09-26 LAB
THYROGLOB AB: <1 IU/ML
THYROGLOB IMA: 12 NG/ML

## 2024-09-29 DIAGNOSIS — R68.2 DRY MOUTH: ICD-10-CM

## 2024-09-29 DIAGNOSIS — E53.8 LOW SERUM VITAMIN B12: ICD-10-CM

## 2024-09-29 DIAGNOSIS — R79.89 LOW VITAMIN D LEVEL: ICD-10-CM

## 2024-09-29 DIAGNOSIS — I10 PRIMARY HYPERTENSION: ICD-10-CM

## 2024-09-29 DIAGNOSIS — E07.9 THYROID DISEASE: ICD-10-CM

## 2024-09-29 DIAGNOSIS — E11.21 DIABETIC NEPHROPATHY ASSOCIATED WITH TYPE 2 DIABETES MELLITUS (HCC): ICD-10-CM

## 2024-09-29 DIAGNOSIS — R73.09 HIGH GLUCOSE LEVEL: ICD-10-CM

## 2024-09-29 DIAGNOSIS — E11.42 DIABETIC PERIPHERAL NEUROPATHY (HCC): ICD-10-CM

## 2024-09-29 DIAGNOSIS — E78.5 DYSLIPIDEMIA: ICD-10-CM

## 2024-09-29 DIAGNOSIS — C73 THYROID CANCER (HCC): ICD-10-CM

## 2024-09-29 DIAGNOSIS — E78.2 MIXED HYPERLIPIDEMIA: ICD-10-CM

## 2024-09-29 DIAGNOSIS — E89.0 S/P COMPLETE THYROIDECTOMY: ICD-10-CM

## 2024-09-29 DIAGNOSIS — E11.65 UNCONTROLLED TYPE 2 DIABETES MELLITUS WITH HYPERGLYCEMIA (HCC): ICD-10-CM

## 2024-09-29 DIAGNOSIS — E89.0 HYPOTHYROIDISM, POSTSURGICAL: ICD-10-CM

## 2024-09-30 RX ORDER — CEVIMELINE HYDROCHLORIDE 30 MG/1
30 CAPSULE ORAL DAILY
Qty: 90 CAPSULE | Refills: 1 | OUTPATIENT
Start: 2024-09-30

## 2024-09-30 NOTE — TELEPHONE ENCOUNTER
Please ask the pt to get this medication from Rheumatology or PCP.   This medication is used to treat symptoms of dry mouth due to a certain immune disease (Sjogren's syndrome)  Thanks

## 2024-10-19 DIAGNOSIS — I10 PRIMARY HYPERTENSION: ICD-10-CM

## 2024-10-19 DIAGNOSIS — R79.89 LOW VITAMIN D LEVEL: ICD-10-CM

## 2024-10-19 DIAGNOSIS — E11.42 DIABETIC PERIPHERAL NEUROPATHY (HCC): ICD-10-CM

## 2024-10-19 DIAGNOSIS — R73.09 HIGH GLUCOSE LEVEL: ICD-10-CM

## 2024-10-19 DIAGNOSIS — R68.2 DRY MOUTH: ICD-10-CM

## 2024-10-19 DIAGNOSIS — E11.21 DIABETIC NEPHROPATHY ASSOCIATED WITH TYPE 2 DIABETES MELLITUS (HCC): ICD-10-CM

## 2024-10-19 DIAGNOSIS — C73 THYROID CANCER (HCC): ICD-10-CM

## 2024-10-19 DIAGNOSIS — E89.0 HYPOTHYROIDISM, POSTSURGICAL: ICD-10-CM

## 2024-10-19 DIAGNOSIS — E07.9 THYROID DISEASE: ICD-10-CM

## 2024-10-19 DIAGNOSIS — E78.5 DYSLIPIDEMIA: ICD-10-CM

## 2024-10-19 DIAGNOSIS — E53.8 LOW SERUM VITAMIN B12: ICD-10-CM

## 2024-10-19 DIAGNOSIS — E89.0 S/P COMPLETE THYROIDECTOMY: ICD-10-CM

## 2024-10-19 DIAGNOSIS — E78.2 MIXED HYPERLIPIDEMIA: ICD-10-CM

## 2024-10-19 DIAGNOSIS — E11.65 UNCONTROLLED TYPE 2 DIABETES MELLITUS WITH HYPERGLYCEMIA (HCC): ICD-10-CM

## 2024-10-19 NOTE — TELEPHONE ENCOUNTER
PANTOPRAZLE SOD DR 40 MG TAB  Qty: 90  Take 1 Tablet by mouth every day in the morning before breakfast.    [facility]  [phone]-Phone  [phone]-Fax

## 2024-10-21 RX ORDER — PANTOPRAZOLE SODIUM 40 MG/1
40 TABLET, DELAYED RELEASE ORAL
Qty: 30 TABLET | Refills: 0 | Status: SHIPPED | OUTPATIENT
Start: 2024-10-21

## 2024-10-29 ENCOUNTER — OFFICE VISIT (OUTPATIENT)
Facility: LOCATION | Age: 44
End: 2024-10-29
Payer: COMMERCIAL

## 2024-10-29 DIAGNOSIS — C73 PAPILLARY THYROID CARCINOMA (HCC): Primary | ICD-10-CM

## 2024-10-29 DIAGNOSIS — R13.13 PHARYNGEAL DYSPHAGIA: ICD-10-CM

## 2024-10-29 PROCEDURE — 99214 OFFICE O/P EST MOD 30 MIN: CPT | Performed by: OTOLARYNGOLOGY

## 2024-10-29 PROCEDURE — 31575 DIAGNOSTIC LARYNGOSCOPY: CPT | Performed by: OTOLARYNGOLOGY

## 2024-10-29 RX ORDER — CEVIMELINE HYDROCHLORIDE 30 MG/1
30 CAPSULE ORAL 3 TIMES DAILY
Qty: 270 CAPSULE | Refills: 1 | Status: SHIPPED | OUTPATIENT
Start: 2024-10-29

## 2024-10-29 RX ORDER — CEVIMELINE HYDROCHLORIDE 30 MG/1
30 CAPSULE ORAL 3 TIMES DAILY
Qty: 200 CAPSULE | Refills: 3 | Status: SHIPPED | OUTPATIENT
Start: 2024-10-29 | End: 2024-10-29

## 2024-10-29 NOTE — PROGRESS NOTES
Paula Hugo is a 43 year old female.   Chief Complaint   Patient presents with    Throat Problem     HPI:   She is postop thyroidectomy for papillary thyroid carcinoma in October.  She underwent a follow-up ultrasound which showed a few lymph nodes.  She also underwent radioactive iodine treatment.  She complains of hoarseness along with a sore throat and xerostomia.    REVIEW OF SYSTEMS:   GENERAL HEALTH: feels well otherwise  GENERAL : denies fever, chills, sweats, weight loss, weight gain  SKIN: denies any unusual skin lesions or rashes  RESPIRATORY: denies shortness of breath with exertion  NEURO: denies headaches    EXAM:   There were no vitals taken for this visit.    System Findings Details   Constitutional  Overall appearance - Normal.   Psychiatric  Orientation - Oriented to time, place, person & situation. Appropriate mood and affect.   Head/Face  Facial features -- Normal. Skull - Normal.   Eyes  Pupils equal ,round ,react to light and accomidate   Ears, Nose, Throat, Neck  Nose clear oral cavity clear pharynx free of lesions neck sup without masses   Neurological  Memory - Normal. Cranial nerves - Cranial nerves II through XII grossly intact.   Lymph Detail  Submental. Submandibular. Anterior cervical. Posterior cervical. Supraclavicular.     Flexible Laryngoscopy Procedure Note (40528)    Due to inability for adequate examination of the larynx and need for magnification to perform the examination, endoscopy was performed.  Risks and benefits were discussed with patient/family and they have given verbal consent to proceed.    Pre-operative Diagnosis:   1. Papillary thyroid carcinoma (HCC)    2. Pharyngeal dysphagia        Post-operative Diagnosis: Same    Procedure: Diagnostic flexible fiberoptic laryngoscopy    Anesthesia: topical lidocaine    Surgeon: Justin Can MD    EBL: 0cc    Procedure Detail & Findings: Nasopharynx base of tongue epiglottis and vocal cords are normal with normal  mobility.  There may be some mild pachyderma changes posteriorly.    Condition: Stable    Complications: Patient tolerated the procedure well with no immediate complications.      Justin Can MD    ASSESSMENT AND PLAN:   1. Papillary thyroid carcinoma (HCC)  Status post total thyroidectomy in October of last year along with radioactive iodine treatment.  Recent ultrasound reviewed which shows some lymph nodes which appear to be normal reactive lymph nodes.  There is some residual thyroid tissue on the right and I do not believe this represents recurrence at this time.  She will continue to follow-up with endocrinology and she has a scan scheduled for December.    2. Pharyngeal dysphagia  She does have xerostomia and I do believe this is a complication of her radioactive iodine treatment.  Hopefully it will resolve with time.  I am going to try her on a salivary stimulant to see if it can help.  There is a possibility reflux disease is playing a role and she is currently on a reflux medication.      The patient indicates understanding of these issues and agrees to the plan.    No follow-ups on file.    Justin Can MD  10/29/2024  6:04 PM

## 2024-11-06 ENCOUNTER — TELEPHONE (OUTPATIENT)
Dept: ENDOCRINOLOGY CLINIC | Facility: CLINIC | Age: 44
End: 2024-11-06

## 2024-11-06 DIAGNOSIS — E11.42 DIABETIC PERIPHERAL NEUROPATHY (HCC): Primary | ICD-10-CM

## 2024-11-06 NOTE — TELEPHONE ENCOUNTER
Patient requests for new RX      Pharmacy comments: Patient requests new RX: pt is requesting test strips ,meter,and lancets.

## 2024-11-07 NOTE — TELEPHONE ENCOUNTER
Pt requesting glucose kit.    ----- INTERVAL HPI/OVERNIGHT EVENTS -----     Patient was seen and examined at bedside. As per nurse and patient, no o/n events, patient resting comfortably. No complaints at this time. Patient denies: fever, chills, dizziness, weakness, HA, Changes in vision, CP, palpitations, SOB, cough, N/V/D/C, dysuria, changes in bowel movements, LE edema. ROS otherwise negative.      Subjective: Patient is a 39y old  Male who presents with a chief complaint of Cellulitis w/ abscess (28 Aug 2023 18:09)      ----- VITAL SIGNS -----  T(F): 97.6 (08-29-23 @ 05:50)  HR: 83 (08-29-23 @ 05:50)  BP: 119/70 (08-29-23 @ 05:50)  RR: 18 (08-29-23 @ 05:50)  SpO2: 94% (08-29-23 @ 05:50)  Wt(kg): --      08-28-23 @ 07:01  -  08-29-23 @ 07:00  --------------------------------------------------------  IN: 0 mL / OUT: 1800 mL / NET: -1800 mL        ----- Physical Exam -----     Constitutional: resting comfortably in bed; NAD  HEENT: NC/AT, PERRL, EOMI, anicteric sclera, no nasal discharge; uvula midline, no oropharyngeal erythema or exudates, MMM; no thyromegaly or anterior/posterior or submental CHUY; neck supple  Respiratory: CTA B/L; no W/R/R, no retractions  Cardiac: +S1/S2; RRR; no M/R/G appreciated  Gastrointestinal: abdomen soft, NT/ND, +BS, no rebound tenderness or guarding  Back: no CVAT B/L  Extremities: legs WWP, no clubbing or cyanosis; no peripheral edema  Vascular: 2+ distal pedal pulses B/L  Dermatologic: skin warm, dry and intact; no rashes, wounds, or scars  Neurologic: AAOx3; CNII-XII grossly intact; strength 5/5 and sensation intact in all 4 extremities; no focal deficits  Psychiatric: affect and characteristics of appearance, verbalizations, behaviors are appropriate    MEDICATIONS  (STANDING):  atorvastatin 20 milliGRAM(s) Oral at bedtime  chlorhexidine 4% Liquid 1 Application(s) Topical daily  dextrose 5%. 1000 milliLiter(s) (50 mL/Hr) IV Continuous <Continuous>  dextrose 5%. 1000 milliLiter(s) (100 mL/Hr) IV Continuous <Continuous>  dextrose 50% Injectable 25 Gram(s) IV Push once  dextrose 50% Injectable 12.5 Gram(s) IV Push once  dextrose 50% Injectable 25 Gram(s) IV Push once  glucagon  Injectable 1 milliGRAM(s) IntraMuscular once  insulin glargine Injectable (LANTUS) 60 Unit(s) SubCutaneous at bedtime  insulin lispro (ADMELOG) corrective regimen sliding scale   SubCutaneous three times a day before meals  insulin lispro (ADMELOG) corrective regimen sliding scale   SubCutaneous at bedtime  insulin lispro Injectable (ADMELOG) 10 Unit(s) SubCutaneous three times a day before meals  lactated ringers. 1000 milliLiter(s) (75 mL/Hr) IV Continuous <Continuous>  levothyroxine 275 MICROGram(s) Oral daily  multivitamin 1 Tablet(s) Oral daily  nicotine -   7 mG/24Hr(s) Patch 1 Patch Transdermal daily  piperacillin/tazobactam IVPB.. 3.375 Gram(s) IV Intermittent every 8 hours  vancomycin  IVPB 1500 milliGRAM(s) IV Intermittent every 12 hours    MEDICATIONS  (PRN):  acetaminophen     Tablet .. 650 milliGRAM(s) Oral every 6 hours PRN Temp greater or equal to 38C (100.4F), Mild Pain (1 - 3)  dextrose Oral Gel 15 Gram(s) Oral once PRN Blood Glucose LESS THAN 70 milliGRAM(s)/deciliter  melatonin 3 milliGRAM(s) Oral at bedtime PRN Insomnia      Allergies    No Known Allergies    Intolerances        ----- LABS -----                         13.3   5.41  )-----------( 160      ( 28 Aug 2023 07:01 )             39.1     08-28    136  |  101  |  14  ----------------------------<  314<H>  4.0   |  22  |  0.85    Ca    8.8      28 Aug 2023 19:33  Phos  3.7     08-28  Mg     1.8     08-28    TPro  6.6  /  Alb  3.7  /  TBili  0.5  /  DBili  x   /  AST  36  /  ALT  56<H>  /  AlkPhos  53  08-28      Urinalysis Basic - ( 28 Aug 2023 19:33 )    Color: x / Appearance: x / SG: x / pH: x  Gluc: 314 mg/dL / Ketone: x  / Bili: x / Urobili: x   Blood: x / Protein: x / Nitrite: x   Leuk Esterase: x / RBC: x / WBC x   Sq Epi: x / Non Sq Epi: x / Bacteria: x          ----- RADIOLOGY & ADDITIONAL TESTS -----     Reviewed

## 2024-11-08 ENCOUNTER — TELEPHONE (OUTPATIENT)
Dept: ENDOCRINOLOGY CLINIC | Facility: CLINIC | Age: 44
End: 2024-11-08

## 2024-11-08 DIAGNOSIS — E11.42 DIABETIC PERIPHERAL NEUROPATHY (HCC): Primary | ICD-10-CM

## 2024-11-08 DIAGNOSIS — E11.65 UNCONTROLLED TYPE 2 DIABETES MELLITUS WITH HYPERGLYCEMIA (HCC): ICD-10-CM

## 2024-11-08 NOTE — TELEPHONE ENCOUNTER
Patient calling to provide meter, lancets and strips. Patients insurance will cover meter Acuchek Guideme, and Lancets test strips. Patient spoke with [facility] and they will send e-script, thanks.

## 2024-11-08 NOTE — TELEPHONE ENCOUNTER
Left message to call back. Patient indicated to me earlier that she could not talk at the moment but has been having fluctuating BG levels and in need of a new meter. Will await pt call back. Was unable to get further details at the time of call.

## 2024-11-08 NOTE — TELEPHONE ENCOUNTER
I called the patient to see if there is a particular brand she is requesting. Current prescribed glucometer is requiring PA. She requested a call back in 5 min.

## 2024-11-14 RX ORDER — LANCETS
EACH MISCELLANEOUS
Qty: 200 EACH | Refills: 1 | Status: SHIPPED | OUTPATIENT
Start: 2024-11-14

## 2024-11-14 RX ORDER — BLOOD SUGAR DIAGNOSTIC
STRIP MISCELLANEOUS
Qty: 200 EACH | Refills: 1 | Status: SHIPPED | OUTPATIENT
Start: 2024-11-14

## 2024-11-14 NOTE — TELEPHONE ENCOUNTER
Per TE 11/6- Accu-chek meter was filled for pt with copay of $10.    Rx sent for accu-chek lancets and test strips.    Endocrine Refill protocol for Glucose testing supplies     Protocol Criteria: PASSED Reason: N/A    If below requirement is met, send a 90-day supply with 1 refill per provider protocol.    Verify appointment with Endocrinology completed in the last 6 months or scheduled in the next 3 months.    Last completed office visit: 9/24/2024 Leigh Taylor MD   Next scheduled Follow up: No future appointments.        LVM notifying pt, [facility] on file.

## 2024-12-05 ENCOUNTER — OFFICE VISIT (OUTPATIENT)
Facility: LOCATION | Age: 44
End: 2024-12-05

## 2024-12-05 ENCOUNTER — LAB ENCOUNTER (OUTPATIENT)
Dept: LAB | Age: 44
End: 2024-12-05
Attending: INTERNAL MEDICINE
Payer: COMMERCIAL

## 2024-12-05 VITALS
HEART RATE: 93 BPM | BODY MASS INDEX: 31.41 KG/M2 | HEIGHT: 60 IN | DIASTOLIC BLOOD PRESSURE: 60 MMHG | WEIGHT: 160 LBS | SYSTOLIC BLOOD PRESSURE: 96 MMHG

## 2024-12-05 DIAGNOSIS — F43.9 STRESS: ICD-10-CM

## 2024-12-05 DIAGNOSIS — R73.09 HIGH GLUCOSE LEVEL: ICD-10-CM

## 2024-12-05 DIAGNOSIS — R79.89 LOW VITAMIN D LEVEL: ICD-10-CM

## 2024-12-05 DIAGNOSIS — E78.5 DYSLIPIDEMIA: ICD-10-CM

## 2024-12-05 DIAGNOSIS — E78.2 MIXED HYPERLIPIDEMIA: ICD-10-CM

## 2024-12-05 DIAGNOSIS — E07.9 THYROID DISEASE: ICD-10-CM

## 2024-12-05 DIAGNOSIS — E11.65 UNCONTROLLED TYPE 2 DIABETES MELLITUS WITH HYPERGLYCEMIA (HCC): Primary | ICD-10-CM

## 2024-12-05 DIAGNOSIS — E89.0 HYPOTHYROIDISM, POSTSURGICAL: ICD-10-CM

## 2024-12-05 DIAGNOSIS — E11.42 DIABETIC PERIPHERAL NEUROPATHY (HCC): ICD-10-CM

## 2024-12-05 DIAGNOSIS — E11.21 DIABETIC NEPHROPATHY ASSOCIATED WITH TYPE 2 DIABETES MELLITUS (HCC): ICD-10-CM

## 2024-12-05 DIAGNOSIS — E11.65 UNCONTROLLED TYPE 2 DIABETES MELLITUS WITH HYPERGLYCEMIA (HCC): ICD-10-CM

## 2024-12-05 DIAGNOSIS — E89.0 S/P COMPLETE THYROIDECTOMY: ICD-10-CM

## 2024-12-05 DIAGNOSIS — R74.01 HIGH LIVER TRANSAMINASE LEVEL: ICD-10-CM

## 2024-12-05 DIAGNOSIS — C73 THYROID CANCER (HCC): ICD-10-CM

## 2024-12-05 DIAGNOSIS — I10 PRIMARY HYPERTENSION: ICD-10-CM

## 2024-12-05 DIAGNOSIS — E53.8 LOW SERUM VITAMIN B12: ICD-10-CM

## 2024-12-05 DIAGNOSIS — R68.2 DRY MOUTH: ICD-10-CM

## 2024-12-05 LAB
CHOLEST SERPL-MCNC: 215 MG/DL (ref ?–200)
GLUCOSE BLOOD: 260
HDLC SERPL-MCNC: 45 MG/DL (ref 40–59)
HEMOGLOBIN A1C: 6.3 % (ref 4.3–5.6)
LDLC SERPL CALC-MCNC: 147 MG/DL (ref ?–100)
NONHDLC SERPL-MCNC: 170 MG/DL (ref ?–130)
T3FREE SERPL-MCNC: 2.98 PG/ML (ref 2.4–4.2)
T4 FREE SERPL-MCNC: 1.5 NG/DL (ref 0.8–1.7)
TEST STRIP LOT #: NORMAL NUMERIC
TRIGL SERPL-MCNC: 130 MG/DL (ref 30–149)
TSI SER-ACNC: 0.44 UIU/ML (ref 0.55–4.78)
VLDLC SERPL CALC-MCNC: 24 MG/DL (ref 0–30)

## 2024-12-05 PROCEDURE — 80061 LIPID PANEL: CPT

## 2024-12-05 PROCEDURE — 82947 ASSAY GLUCOSE BLOOD QUANT: CPT | Performed by: INTERNAL MEDICINE

## 2024-12-05 PROCEDURE — 36415 COLL VENOUS BLD VENIPUNCTURE: CPT

## 2024-12-05 PROCEDURE — 84439 ASSAY OF FREE THYROXINE: CPT

## 2024-12-05 PROCEDURE — 84443 ASSAY THYROID STIM HORMONE: CPT

## 2024-12-05 PROCEDURE — 83036 HEMOGLOBIN GLYCOSYLATED A1C: CPT | Performed by: INTERNAL MEDICINE

## 2024-12-05 PROCEDURE — 99215 OFFICE O/P EST HI 40 MIN: CPT | Performed by: INTERNAL MEDICINE

## 2024-12-05 PROCEDURE — 84432 ASSAY OF THYROGLOBULIN: CPT

## 2024-12-05 PROCEDURE — 84481 FREE ASSAY (FT-3): CPT

## 2024-12-05 PROCEDURE — 86800 THYROGLOBULIN ANTIBODY: CPT

## 2024-12-05 RX ORDER — LEVOTHYROXINE SODIUM 125 MCG
125 TABLET ORAL
Qty: 90 TABLET | Refills: 1 | Status: SHIPPED | OUTPATIENT
Start: 2024-12-05

## 2024-12-05 NOTE — PATIENT INSTRUCTIONS
Added lipid panel   Will see dentist soon   Losartan  - stop it now. Check BP at home. If Blood pressure is high, resume 1/2 a tablet   Pending tg levels  TSH is at goal   Ozempic 1 mg weekly  Metformin   Farxiga  Zetia    B12 - OTC    vit D daily - OTC    US, labs and follow up in 2/2025     Thyroid medication dose: Synthroid 125 mcg /day.   Take you medication on empty stomach, not with any other medication or food. Wait 60 minutes before eating. Wait 4 hours before taking Vitamins, Calcium or iron. On the morning of the lab test, please take the medication after the blood test not before. Do not take Biotin 1 week before blood test.

## 2024-12-05 NOTE — PROGRESS NOTES
Reason for Visit:  thyroid cancer, hypothyroid, DM, obese, DLP, statin intolerance  Requesting Physician:   ..HARSH MALHOTRA      CHIEF COMPLAINT:    Chief Complaint   Patient presents with    Diabetes     F/u    Thyroid Problem     F/u        HISTORY OF PRESENT ILLNESS:   Paula Hugo is a 43 year old female who presents with thyroid cancer, PTC stage I, hypothyroid, DM, obese, DLP, statin intolerance and low vit D    She has no energy  Sleep is not good - she wakes up at 1 AM and stays up. She is tired in daytime  She feels bloated   Sometimes gets swelling in hands and feet   Has stress and does not mind seeing a therapist     No palpitation or anxiety   Wt is stable     Has dry skin and dry hair. Mouth is dry also  since CURRY     Has compressive symptoms now and had an appt with Justin Marks MD ENT on 10/29/2024     She walks daily 45 min     Now on Synthroid 125 mcg /day as rec'   Feels better on brand name     We discussed labs and US   We reviewed images and discussed findings. Still has Rt thyroid bed residual. Tg levels still high but downtrending.        Thyroid cancer history   9/11/2023 Right mid thyroid nodule, FNA - PTC ([place] [facility])  10/26/2023 Bilateral Total Thyroidectomy  by Justin Can MD      12/2023 US   . There is soft tissue mass in area of right thyroidectomy which has suspicious imaging appearance on ultrasound and should be further evaluated with CT or I 131 imaging study.   . Findings in left neck in thyroid region are probably granulation tissue but nonspecific.   . Mildly enlarged left submandibular and node is noted.  This does maintain normal lymph node architecture although should be correlated clinically.     12/8/2023 TSH >100, Tg 143, neg TgAb  12/26/023   on 50 mcg Synthroid  1/11/2024 TSH 3.1  1/16/2024 TSH 1.39 125 mcg Synthroid    12/2023 US post op month #2 when her TSH was > 100 which showed possible Rt thyroid bed residual.   2/2024 TSH 0.059  FT41.7   Tg 8.9 TgAb <1.0   3/28/2024 US right thyroid bed is a hypoechoic, 0.9 x 0.9 x 1.6 versus 1 x 1.1 x 1.7 cm nodule.  Within the left thyroid bed best seen on trans imaging is a stable 4 x 5 versus 4 x 6 mm echogenic area.   4/2/2024 TSH 0.8 , Tg 13.9, TgAb <1    6/10/2024  CURRY treatment 106.9 mCi I-131   6/17/2024 Post tx scan There is radiotracer accumulation within the thyroid bed bilaterally which could reflect recurrent or residual neoplasm and/or residual thyroid tissue.  No definitive distant metastatic disease.   7/30/2024 TSH 0.5, FT4 1.4, Tg 18, TgAb<1  8/8/2024 rt thyroid bed residual is smaller now   8/2024 head/neck US showed  1.3 x 0.9 x 0.8 cm hypoechoic structure within the right thyroidectomy bed, stable to minimally decreased in size from prior.   Within the right upper lateral neck there is a 1.9 x 0.9 x 1.4 cm lymph node which demonstrates a fatty hilum and thin cortex. Within the right submandibular region there is a 1.6 x 0.6 x 1.3 cm lymph node with fatty hilum and thin cortex.  Within the   left submandibular region there is a 1.2 x 0.5 x 1.0 cm lymph node with fatty hilum and thin cortex.   The previously identified lesion in the left thyroidectomy bed is not identified on the current exam.  Attention on follow-up is recommended.     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  -Tumor is confined to the thyroid parenchyma.  -Margins of resection are free of tumor.  -Single right perithyroidal lymph node, negative for metastatic carcinoma.           SPECIMEN   Procedure  Total thyroidectomy   TUMOR   Tumor Focality  Unifocal   Tumor Characteristics     Tumor Site  Right lobe   Tumor Size  Greatest Dimension (Centimeters): 2.5 cm   Histologic Tumor Types and Subtypes   Papillary thyroid carcinoma   Tumor Proliferative Activity     Mitotic Rate  Less than 3 mitoses per 2mm2   Tumor Necrosis  Not identified   Angioinvasion (vascular invasion)  Not identified   Lymphatic Invasion  Not identified   Perineural Invasion  Not identified   Extrathyroidal Extension  Not identified   Margin Status  All margins negative for carcinoma   Distance from Invasive Carcinoma to Closest Margin  Less than 1 mm   REGIONAL LYMPH NODES   Regional Lymph Node Status  All regional lymph nodes negative for tumor   Number of Lymph Nodes Examined  1   Matt Level(s) Examined  Level VI   pTNM CLASSIFICATION (AJCC 8th Edition)   Reporting of pT, pN, and (when applicable) pM categories is based on information available to the pathologist at the time the report is issued. As per the AJCC (Chapter 1, 8th Ed.) it is the managing physician’s responsibility to establish the final pathologic stage based upon all pertinent information, including but potentially not limited to this pathology report.        pT Category  pT2   pN Category  pN0a   ADDITIONAL FINDINGS   Additional Findings  None identified     DM   On metformin, farxiga, ozempic 1 mg/week     Lab Results   Component Value Date    A1C 6.3 (A) 12/05/2024    A1C 6.0 (H) 07/30/2024    A1C 7.9 (H) 10/28/2023    A1C 7.5 (H) 12/17/2019 07/30/24 11:27   CREATININE UR RANDOM mg/dL 71.30   MALB URINE mg/dL 0.70   MALB/CRE CALC <=30.0 ug/mg 9.8         DLP On zetia   She did not tolerate statin- forgetfulness. Does not want to try them again.        Low vit D3   Low b12         ASSESSMENT AND PLAN:  43 year old woman with PTC stage I T2 N0 M0, s/p CURRY 106 and post tx scan no distant mets.  post op hypothyroid, DM, obese, DLP, statin intolerance, low b12 and low vit D    10/2023 pathology showed 2.5 cm PTC Rt side, no ETE/vascular/lymphatic/perineural invasion, negative margins, 0/1 LN VI,      Her initial recurrence risk per PRAKASH was ~ 5% based on pathology  alone. Her post op w/u showed high Tg levels ( stimulated 143 and non-stimulated 8.9)    3/2024 US thyroid also showed some possible BL thyroid bed residual.      6/10/2024  CURRY treatment 106.9 mCi I-131   2024 Post tx scan There is radiotracer accumulation within the thyroid bed bilaterally which could reflect recurrent or residual neoplasm and/or residual thyroid tissue.  No definitive distant metastatic disease.        Clinically, euthyroid  with nonspecific symptoms   Biochemically,   2024 TSH 0.5, FT4 1.4, Tg 18, TgAb<1  2024 TSH 0.4 , Tg pending on 125 mcg Synthroid    2024 rt thyroid bed residual is smaller now       HTN on Losrtan 25 but BP is on low side today         Plan   Added lipid panel  Will refer to psych  US, labs and follow up in 2025    Will see dentist soon   Losartan  - stop it now. Check BP at home. If Blood pressure is high, resume 1/2 a tablet   Pending tg levels  TSH is at goal      Ozempic 1 mg weekly  Metformin   Farxiga  Zetia    B12 - OTC    vit D daily - OTC          Thyroid medication dose: Synthroid 125 mcg /day.   Take you medication on empty stomach, not with any other medication or food. Wait 60 minutes before eating. Wait 4 hours before taking Vitamins, Calcium or iron. On the morning of the lab test, please take the medication after the blood test not before. Do not take Biotin 1 week before blood test.      Did not tolerate atorvastatin and does not want to try them again        PAST MEDICAL HISTORY:   Past Medical History:    Diabetes (HCC)    Disorder of thyroid    High blood pressure    High cholesterol    History of COVID-19    Had flu like S/S x 2-3 days.    Hypothyroidism    Neuropathy    feet    PONV (postoperative nausea and vomiting)    Visual impairment    glasses       PAST SURGICAL HISTORY:   Past Surgical History:   Procedure Laterality Date      2002      06/15/2005      2007      10/17/2013    10/2023 thyroidectomy      CURRENT MEDICATIONS:    Current Outpatient Medications   Medication Sig Dispense Refill    SYNTHROID 125 MCG Oral Tab Take 1 tablet (125 mcg total) by mouth before breakfast. Synthroid brand name only 90 tablet 1    pantoprazole 40 MG Oral Tab EC Take 1 tablet (40 mg total) by mouth every morning before breakfast. 30 tablet 0    Xylitol (XYLIMELTS) 500 MG Mouth/Throat Disk Use as directed 1 each in the mouth or throat daily. 90 each 0    FARXIGA 10 MG Oral Tab Take 1 tablet (10 mg total) by mouth daily. Can hold until diet improves 30 tablet 5    semaglutide (OZEMPIC, 1 MG/DOSE,) 4 MG/3ML Subcutaneous Solution Pen-injector Inject 1 mg into the skin once a week. 9 mL 1    metFORMIN  MG Oral Tablet 24 Hr Take 1 tablet (500 mg total) by mouth 2 (two) times daily with meals. Can hold until diet improves 180 tablet 0    losartan 25 MG Oral Tab Take 1 tablet (25 mg total) by mouth at bedtime. 90 tablet 0    ezetimibe 10 MG Oral Tab Take 1 tablet (10 mg total) by mouth nightly.      Glucose Blood (ACCU-CHEK GUIDE TEST) In Vitro Strip Use to check blood glucose twice daily. Dx E11.65. 200 each 1    Accu-Chek Softclix Lancets Does not apply Misc Use to check blood glucose twice daily. Dx E11.65. 200 each 1    Blood Glucose Monitoring Suppl (D-CARE GLUCOMETER) w/Device Does not apply Kit 1 each As Directed. Dx: E11.65 1 kit 0    Cevimeline HCl 30 MG Oral Cap Take 1 capsule (30 mg total) by mouth 3 (three) times daily. 270 capsule 1    Cevimeline HCl 30 MG Oral Cap Take 1 capsule (30 mg total) by mouth daily. 90 capsule 0    ondansetron (ZOFRAN) 4 mg tablet Take 1 tablet (4 mg total) by mouth every 8 (eight) hours as needed for Nausea. (Patient not taking: Reported on 8/20/2024) 12 tablet 0    Continuous Glucose Sensor (DEXCOM G7 SENSOR) Does not apply Misc 1 each Every 10 days. Use as directed every 10 days 3 each 11    LORazepam 0.5 MG Oral Tab Take 0.5-1 tablets (0.25-0.5 mg total) by  mouth. (Patient not taking: Reported on 12/5/2024)      ergocalciferol 1.25 MG (46299 UT) Oral Cap Take 1 capsule (50,000 Units total) by mouth twice a week.      oxyCODONE 5 MG Oral Tab Take 1 tablet (5 mg total) by mouth every 4 (four) hours as needed. 20 tablet 0     No medication comments found.   SYNTHROID 125 MCG Oral Tab Take 1 tablet (125 mcg total) by mouth before breakfast. Synthroid brand name only 90 tablet 1    pantoprazole 40 MG Oral Tab EC Take 1 tablet (40 mg total) by mouth every morning before breakfast. 30 tablet 0    Xylitol (XYLIMELTS) 500 MG Mouth/Throat Disk Use as directed 1 each in the mouth or throat daily. 90 each 0    FARXIGA 10 MG Oral Tab Take 1 tablet (10 mg total) by mouth daily. Can hold until diet improves 30 tablet 5    semaglutide (OZEMPIC, 1 MG/DOSE,) 4 MG/3ML Subcutaneous Solution Pen-injector Inject 1 mg into the skin once a week. 9 mL 1    metFORMIN  MG Oral Tablet 24 Hr Take 1 tablet (500 mg total) by mouth 2 (two) times daily with meals. Can hold until diet improves 180 tablet 0    losartan 25 MG Oral Tab Take 1 tablet (25 mg total) by mouth at bedtime. 90 tablet 0    ezetimibe 10 MG Oral Tab Take 1 tablet (10 mg total) by mouth nightly.          ALLERGIES:  Allergies   Allergen Reactions    Penicillins HIVES       SOCIAL HISTORY:    Social History     Socioeconomic History    Marital status:    Tobacco Use    Smoking status: Never    Smokeless tobacco: Never   Vaping Use    Vaping status: Never Used   Substance and Sexual Activity    Alcohol use: Not Currently    Drug use: Never   Other Topics Concern    Caffeine Concern Yes     Comment: 1-2 cups a day    Special Diet Yes     Comment: 2-3 x week       FAMILY HISTORY:   Family History   Problem Relation Age of Onset    Diabetes Neg              PHYSICAL EXAM:   Height: 5' (152.4 cm) (12/05 1317)  Weight: 160 lb (72.6 kg) (12/05 1317)  BSA (Calculated - sq m): 1.7 sq meters (12/05 1317)  Pulse: 93 (12/05  1317)  BP: 96/60 (12/05 1317)  Temp: --  Do Not Use - Resp Rate: --  SpO2: --    Body mass index is 31.25 kg/m².  Wt Readings from Last 6 Encounters:   12/05/24 160 lb (72.6 kg)   09/24/24 157 lb (71.2 kg)   08/20/24 156 lb 9.6 oz (71 kg)   07/12/24 155 lb (70.3 kg)   06/04/24 155 lb 6.4 oz (70.5 kg)   04/02/24 152 lb (68.9 kg)       Obese   Well healed wound at the base of the neck   No LAP on exam      CONSTITUTIONAL:  Awake and alert. Age appropriate, good hygiene not in acute distress. Well nourished and well developed. no acute distress   PSYCH:   Orientated to time, place, person & situation, Normal mood and affect, memory intact, normal insight and judgment, cooperative  Neuro: speech is clear. Awake, alert, no aphasia, no facial asymmetry, no nuchal rigidity  EYES:  No proptosis, no ptosis, conjunctiva normal  ENT:  Normocephalic, atraumatic  Eye: EOMI, normal lids, no discharge, no conjunctival erythema. No exophthalmos/proptosis, Ptosis negative   No rhinorrhea, moist oral mucosa  Neck: full range of motion  Neck/Thyroid: neck inspection:  + scar,       DATA:   Pertinent data reviewed      08/08/24 11:18   US HEAD/NECK (CPT=76536) Patient is status post thyroidectomy.   There is a 1.3 x 0.9 x 0.8 cm hypoechoic structure within the right thyroidectomy bed, stable to minimally decreased in size from prior.   Within the right upper lateral neck there is a 1.9 x 0.9 x 1.4 cm lymph node which demonstrates a fatty hilum and thin cortex.  Within the right submandibular region there is a 1.6 x 0.6 x 1.3 cm lymph node with fatty hilum and thin cortex.  Within the   left submandibular region there is a 1.2 x 0.5 x 1.0 cm lymph node with fatty hilum and thin cortex.   The previously identified lesion in the left thyroidectomy bed is not identified on the current exam    Rpt: View report in Results Review for more information      Latest Reference Range & Units 12/08/23 12:40 02/20/24 10:46   Thyroglob Ab 0.0 - 0.9  IU/mL <1.0 <1.0   Thyroglob LIA 1.5 - 38.5 ng/mL 143.4 (H) 8.9   (H): Data is abnormally high   Latest Reference Range & Units 07/30/24 11:27   Glucose 70 - 99 mg/dL 114 (H)   HEMOGLOBIN A1c <5.7 % 6.0 (H)   ESTIMATED AVERAGE GLUCOSE 68 - 126 mg/dL 126   Sodium 136 - 145 mmol/L 136   Potassium 3.5 - 5.1 mmol/L 4.0   Chloride 98 - 112 mmol/L 106   Carbon Dioxide, Total 21.0 - 32.0 mmol/L 25.0   BUN 9 - 23 mg/dL 13   CREATININE 0.55 - 1.02 mg/dL 0.47 (L)   CALCIUM 8.7 - 10.4 mg/dL 9.3   EGFR >=60 mL/min/1.73m2 121   ANION GAP 0 - 18 mmol/L 5   CALCULATED OSMOLALITY 275 - 295 mOsm/kg 283   ALKALINE PHOSPHATASE 37 - 98 U/L 85   AST (SGOT) <34 U/L 38 (H)   ALT (SGPT) 10 - 49 U/L 53 (H)   Total Bilirubin 0.3 - 1.2 mg/dL 0.9   Globulin 2.0 - 3.5 g/dL 2.5   Magnesium, Serum 1.6 - 2.6 mg/dL 2.1   (   Latest Reference Range & Units 07/30/24 11:27   T4,Free (Direct) 0.8 - 1.7 ng/dL 1.4   TSH 0.550 - 4.780 mIU/mL 0.552      Latest Reference Range & Units 07/30/24 11:27   CREATININE UR RANDOM mg/dL 71.30   MALB URINE mg/dL 0.70   MALB/CRE CALC <=30.0 ug/mg 9.8      Latest Reference Range & Units 07/30/24 11:27   Thyroglob Ab 0.0 - 0.9 IU/mL <1.0   Thyroglobulin, LIA 1.5 - 38.5 ng/mL 18.8       Recent Labs     12/05/24  1139   TSH 0.440*   T4F 1.5   T3F 2.98       No results found.    TSH W Reflex To Free T4        Component Value Flag Ref Range Units Status    TSH 0.440      0.550 - 4.780 uIU/mL Final                  T4, FREE (S)        Component Value Flag Ref Range Units Status    Free T4 1.5      0.8 - 1.7 ng/dL Final                  Free T3 (Triiodothryronine)        Component Value Flag Ref Range Units Status    T3 Free 2.98      2.40 - 4.20 pg/mL Final                  Lipid Panel        Component Value Flag Ref Range Units Status    Cholesterol, Total 215      <200 mg/dL Final    Comment:    Desirable  <200 mg/dL  Borderline  200-239 mg/dL  High      >=240 mg/dL        HDL Cholesterol 45      40 - 59 mg/dL Final     Comment:    Interpretive Information:   An HDL cholesterol <40 mg/dL is low and constitutes a coronary heart disease risk factor. An HDL cholesterol >60 mg/dL is a negative risk factor for coronary heart disease.        Triglycerides 130      30 - 149 mg/dL Final    Comment:    Reference interval for fasting triglycerides  Desirable: <150 mg/dL  Borderline: 150-199 mg/dL  High: 200-499 mg/dL  Very High: >=500 mg/dL          LDL Cholesterol 147      <100 mg/dL Final    Comment:    Optimal            <100 mg/dL   Near/Above OptimaL 100-129 mg/dL   Borderline High    130-159 mg/dL   High               160-189 mg/dL    Very High          >190 mg/dL         VLDL 24      0 - 30 mg/dL Final    Non HDL Chol 170      <130 mg/dL Final    Comment:    Desirable  <130 mg/dL   Borderline  130-159 mg/dL   High        160-189 mg/dL       Very high >=190 mg/dL                      POC HemoCue Glucose 201 (Finger stick glucose)        Component Value Flag Ref Range Units Status    GLUCOSE BLOOD 260        Final    Test Strip Lot # [id number]       Numeric Final    Test Strip Expiration Date 6/2/25       Date Final                  POC Glycohemoglobin [04649]        Component Value Flag Ref Range Units Status    HEMOGLOBIN A1C 6.3      4.3 - 5.6 % Final    Cartridge Lot# [id number]       Numeric Final    Cartridge Expiration Date 5/23/26       Date Final                      Orders Placed This Encounter   Procedures    TSH and Free T4    Thyroglobulin Antibody and Tumor Marker    Lipid Panel    POC HemoCue Glucose 201 (Finger stick glucose)    POC Glycohemoglobin [74408]     Orders Placed This Encounter    TSH and Free T4     Standing Status:   Future     Standing Expiration Date:   12/5/2025     Order Specific Question:   Release to patient     Answer:   Immediate    Thyroglobulin Antibody and Tumor Marker     Standing Status:   Future     Standing Expiration Date:   12/5/2025     Order Specific Question:   Release to patient      Answer:   Immediate    Lipid Panel     Standing Status:   Future     Number of Occurrences:   1     Standing Expiration Date:   12/5/2025     Order Specific Question:   Release to patient     Answer:   Immediate    POC HemoCue Glucose 201 (Finger stick glucose)     Order Specific Question:   Release to patient     Answer:   Immediate    POC Glycohemoglobin [34055]     Order Specific Question:   Release to patient     Answer:   Immediate    SYNTHROID 125 MCG Oral Tab     Sig: Take 1 tablet (125 mcg total) by mouth before breakfast. Synthroid brand name only     Dispense:  90 tablet     Refill:  1     Please do not substitute for generic.          This is a specialized patient consultation in endocrinology and required comprehensive review of prior records, as well as current evaluation, with time required for consideration of complex endocrine issues and consultation. For this visit, I personally interviewed the patient, and family member if accompanied, performed the pertinent parts of the history and physical examination. ROS included screening for appropriate endocrine conditions.   Today's diagnosis and plan were reviewed in detail with the patient who states understanding and agrees with plan. I discussed with the patient possible diagnosis, differential diagnosis, need for work up , treatment options, alternatives and side effects.     Please see note for details about time spent which includes:   · pre-visit preparation  · reviewing records  · face to face time with the patient   · timely documentation of the encounter  · ordering medications/tests  · communication with care team  · care coordination    I appreciate the opportunity to be part of your patient's medical care and will keep you, as the referring and primary physicians, informed about the care of your patient, including possible future surgery and pathology findings. Please feel free to contact me should you have any questions.     Total 40 minutes    Leigh Taylor MD

## 2024-12-06 LAB
THYROGLOB AB: <1 IU/ML
THYROGLOB IMA: 8.2 NG/ML

## 2024-12-08 ENCOUNTER — TELEPHONE (OUTPATIENT)
Age: 44
End: 2024-12-08

## 2024-12-08 DIAGNOSIS — E53.8 LOW SERUM VITAMIN B12: ICD-10-CM

## 2024-12-08 DIAGNOSIS — E89.0 S/P COMPLETE THYROIDECTOMY: ICD-10-CM

## 2024-12-08 DIAGNOSIS — E89.0 HYPOTHYROIDISM, POSTSURGICAL: ICD-10-CM

## 2024-12-08 DIAGNOSIS — E07.9 THYROID DISEASE: ICD-10-CM

## 2024-12-08 DIAGNOSIS — E78.2 MIXED HYPERLIPIDEMIA: ICD-10-CM

## 2024-12-08 DIAGNOSIS — E11.42 DIABETIC PERIPHERAL NEUROPATHY (HCC): ICD-10-CM

## 2024-12-08 DIAGNOSIS — E11.65 UNCONTROLLED TYPE 2 DIABETES MELLITUS WITH HYPERGLYCEMIA (HCC): ICD-10-CM

## 2024-12-08 DIAGNOSIS — E11.21 DIABETIC NEPHROPATHY ASSOCIATED WITH TYPE 2 DIABETES MELLITUS (HCC): ICD-10-CM

## 2024-12-08 DIAGNOSIS — C73 THYROID CANCER (HCC): ICD-10-CM

## 2024-12-08 DIAGNOSIS — R68.2 DRY MOUTH: ICD-10-CM

## 2024-12-08 DIAGNOSIS — R73.09 HIGH GLUCOSE LEVEL: ICD-10-CM

## 2024-12-08 DIAGNOSIS — E78.5 DYSLIPIDEMIA: ICD-10-CM

## 2024-12-08 DIAGNOSIS — I10 PRIMARY HYPERTENSION: ICD-10-CM

## 2024-12-08 DIAGNOSIS — R79.89 LOW VITAMIN D LEVEL: ICD-10-CM

## 2024-12-09 RX ORDER — PANTOPRAZOLE SODIUM 40 MG/1
40 TABLET, DELAYED RELEASE ORAL
Qty: 30 TABLET | Refills: 0 | Status: SHIPPED | OUTPATIENT
Start: 2024-12-09

## 2024-12-09 NOTE — TELEPHONE ENCOUNTER
Endocrine Refill protocol for oral medications    Protocol Criteria:  PASSED  Reason: N/A    If below requirement is met, send a 90-day supply with 1 refill per provider protocol.    Verify appointment with Endocrinology completed in the last 6 months or scheduled in the next 3 months.    Last completed office visit: 12/5/2024 Leigh Taylor MD   Next scheduled Follow up:   Future Appointments   Date Time Provider Department Center   2/6/2025 11:00 AM [facility]

## 2024-12-16 RX ORDER — SODIUM FLUORIDE 1.1 G/100G
1 GEL ORAL DAILY
Qty: 56 G | Refills: 1 | OUTPATIENT
Start: 2024-12-16

## 2024-12-18 ENCOUNTER — PATIENT MESSAGE (OUTPATIENT)
Facility: LOCATION | Age: 44
End: 2024-12-18

## 2024-12-18 DIAGNOSIS — C73 THYROID CANCER (HCC): Primary | ICD-10-CM

## 2024-12-18 DIAGNOSIS — E07.9 THYROID DISEASE: ICD-10-CM

## 2024-12-23 RX ORDER — SODIUM FLUORIDE 1.1 G/100G
1 GEL ORAL DAILY
Qty: 56 G | Refills: 1 | OUTPATIENT
Start: 2024-12-23

## 2024-12-24 DIAGNOSIS — I10 PRIMARY HYPERTENSION: ICD-10-CM

## 2024-12-24 DIAGNOSIS — E53.8 LOW SERUM VITAMIN B12: ICD-10-CM

## 2024-12-24 DIAGNOSIS — E78.2 MIXED HYPERLIPIDEMIA: ICD-10-CM

## 2024-12-24 DIAGNOSIS — R68.2 DRY MOUTH: ICD-10-CM

## 2024-12-24 DIAGNOSIS — R79.89 LOW VITAMIN D LEVEL: ICD-10-CM

## 2024-12-24 DIAGNOSIS — E78.5 DYSLIPIDEMIA: ICD-10-CM

## 2024-12-24 DIAGNOSIS — C73 THYROID CANCER (HCC): ICD-10-CM

## 2024-12-24 DIAGNOSIS — E11.65 UNCONTROLLED TYPE 2 DIABETES MELLITUS WITH HYPERGLYCEMIA (HCC): ICD-10-CM

## 2024-12-24 DIAGNOSIS — E07.9 THYROID DISEASE: ICD-10-CM

## 2024-12-24 DIAGNOSIS — R73.09 HIGH GLUCOSE LEVEL: ICD-10-CM

## 2024-12-24 DIAGNOSIS — E11.21 DIABETIC NEPHROPATHY ASSOCIATED WITH TYPE 2 DIABETES MELLITUS (HCC): ICD-10-CM

## 2024-12-24 DIAGNOSIS — E89.0 HYPOTHYROIDISM, POSTSURGICAL: ICD-10-CM

## 2024-12-24 DIAGNOSIS — E89.0 S/P COMPLETE THYROIDECTOMY: ICD-10-CM

## 2024-12-24 DIAGNOSIS — E11.42 DIABETIC PERIPHERAL NEUROPATHY (HCC): ICD-10-CM

## 2024-12-24 RX ORDER — PANTOPRAZOLE SODIUM 40 MG/1
40 TABLET, DELAYED RELEASE ORAL
Qty: 90 TABLET | Refills: 0 | Status: SHIPPED | OUTPATIENT
Start: 2024-12-24

## 2024-12-24 NOTE — TELEPHONE ENCOUNTER
90 Day Prescription request    Current Outpatient Medications   Medication Sig Dispense Refill    PANTOPRAZOLE 40 MG Oral Tab EC TAKE 1 TABLET BY MOUTH EVERY DAY IN THE MORNING BEFORE BREAKFAST 30 tablet 0

## 2024-12-24 NOTE — TELEPHONE ENCOUNTER
Endocrine Refill protocol for oral medications    Protocol Criteria:  PASSED      If below requirement is met, send a 90-day supply with 1 refill per provider protocol.    Verify appointment with Endocrinology completed in the last 6 months or scheduled in the next 3 months.    Last completed office visit: 12/5/2024 Leigh Taylor MD   Next scheduled Follow up:   Future Appointments   Date Time Provider Department Center   2/6/2025 11:00 AM [facility]

## 2024-12-26 NOTE — TELEPHONE ENCOUNTER
Dr. Taylor,     Patient requesting for US order to be released now,not 12/30 so she can schedule and do US asap prior to end of the year, pt does not want to wait. Please advise, thanks!     Tried to triage pt for hyperglycemia. Patient reported frustration and disappointment that she was not called in timely manner and stated she spoke with PCP and BG are now back to normal.

## 2024-12-27 ENCOUNTER — HOSPITAL ENCOUNTER (OUTPATIENT)
Dept: ULTRASOUND IMAGING | Age: 44
Discharge: HOME | End: 2024-12-27
Attending: INTERNAL MEDICINE
Payer: COMMERCIAL

## 2024-12-27 ENCOUNTER — TELEPHONE (OUTPATIENT)
Facility: LOCATION | Age: 44
End: 2024-12-27

## 2024-12-27 DIAGNOSIS — E07.9 THYROID DISEASE: ICD-10-CM

## 2024-12-27 PROCEDURE — 76536 US EXAM OF HEAD AND NECK: CPT | Performed by: INTERNAL MEDICINE

## 2024-12-27 NOTE — TELEPHONE ENCOUNTER
Received fax from [facility] with labs collected on 12/23/2024. Placed in providers folder for review.

## 2024-12-30 ENCOUNTER — TELEPHONE (OUTPATIENT)
Dept: ENDOCRINOLOGY CLINIC | Facility: CLINIC | Age: 44
End: 2024-12-30

## 2024-12-30 NOTE — TELEPHONE ENCOUNTER
Please call pt with work up result     12/24/2024  TSH 0.3  B12 277  FT4 1.19    Vit D 27  Iron sat 26  TIBC 515 H  Transferrin 368 H    Start D3, B12 and iron over the counter   Will discuss more next visit   Thanks

## 2025-01-18 DIAGNOSIS — E11.42 DIABETIC PERIPHERAL NEUROPATHY (HCC): ICD-10-CM

## 2025-01-20 RX ORDER — BLOOD-GLUCOSE METER
1 EACH MISCELLANEOUS AS DIRECTED
Qty: 1 KIT | Refills: 0 | Status: SHIPPED | OUTPATIENT
Start: 2025-01-20

## 2025-01-20 NOTE — TELEPHONE ENCOUNTER
Endocrine Refill protocol for Glucose testing supplies     Protocol Criteria: PASSED Reason: N/A    If below requirement is met, send a 90-day supply with 1 refill per provider protocol.    Verify appointment with Endocrinology completed in the last 6 months or scheduled in the next 3 months.    Last completed office visit: 12/5/2024 Leigh Taylor MD   Next scheduled Follow up:   Future Appointments   Date Time Provider Department Center   1/28/2025 10:00 AM Karen Jacobson APRN [facility]   2/6/2025 11:00 AM Leigh Taylor MD [facility]  [facility]

## 2025-01-30 DIAGNOSIS — E89.0 S/P COMPLETE THYROIDECTOMY: ICD-10-CM

## 2025-01-30 DIAGNOSIS — I10 PRIMARY HYPERTENSION: ICD-10-CM

## 2025-01-30 DIAGNOSIS — E78.2 MIXED HYPERLIPIDEMIA: ICD-10-CM

## 2025-01-30 DIAGNOSIS — E78.5 DYSLIPIDEMIA: ICD-10-CM

## 2025-01-30 DIAGNOSIS — R74.01 HIGH LIVER TRANSAMINASE LEVEL: ICD-10-CM

## 2025-01-30 DIAGNOSIS — E11.21 DIABETIC NEPHROPATHY ASSOCIATED WITH TYPE 2 DIABETES MELLITUS (HCC): ICD-10-CM

## 2025-01-30 DIAGNOSIS — E89.0 HYPOTHYROIDISM, POSTSURGICAL: ICD-10-CM

## 2025-01-30 DIAGNOSIS — E11.42 DIABETIC PERIPHERAL NEUROPATHY (HCC): ICD-10-CM

## 2025-01-30 DIAGNOSIS — C73 THYROID CANCER (HCC): ICD-10-CM

## 2025-01-30 DIAGNOSIS — E11.65 UNCONTROLLED TYPE 2 DIABETES MELLITUS WITH HYPERGLYCEMIA (HCC): ICD-10-CM

## 2025-01-30 DIAGNOSIS — R73.09 HIGH GLUCOSE LEVEL: ICD-10-CM

## 2025-01-30 DIAGNOSIS — R79.89 LOW VITAMIN D LEVEL: ICD-10-CM

## 2025-01-30 DIAGNOSIS — E07.9 THYROID DISEASE: ICD-10-CM

## 2025-01-30 DIAGNOSIS — E53.8 LOW SERUM VITAMIN B12: ICD-10-CM

## 2025-01-30 RX ORDER — METFORMIN HYDROCHLORIDE 500 MG/1
500 TABLET, EXTENDED RELEASE ORAL 2 TIMES DAILY WITH MEALS
Qty: 180 TABLET | Refills: 0 | Status: SHIPPED | OUTPATIENT
Start: 2025-01-30

## 2025-01-30 NOTE — TELEPHONE ENCOUNTER
Endocrine Refill protocol for metformin    Protocol Criteria:  PASSED  Reason: N/A    If all below requirements are met, send a 90-day supply with 1 refill per provider protocol.     Verify appointment with Endocrinology completed in the last 6 months or scheduled in the next 3 months.  Verify A1C has been completed within the last 6 months and is below 8.5%  Verify last GFR is greater than or equal to 40 in the past 12 months    Last completed office visit:12/5/2024 Leigh Taylor MD   Next scheduled Follow up:   Future Appointments   Date Time Provider Department Center   2/6/2025 11:00 AM Leigh Taylor MD [facility]       Last GFR result:    Lab Results   Component Value Date    EGFRCR 121 07/30/2024     Last A1c result: Last A1C result: 6.3% done 12/5/2024.

## 2025-02-06 ENCOUNTER — LAB ENCOUNTER (OUTPATIENT)
Dept: LAB | Age: 45
End: 2025-02-06
Attending: INTERNAL MEDICINE
Payer: COMMERCIAL

## 2025-02-06 ENCOUNTER — OFFICE VISIT (OUTPATIENT)
Facility: LOCATION | Age: 45
End: 2025-02-06

## 2025-02-06 VITALS
BODY MASS INDEX: 31.8 KG/M2 | WEIGHT: 162 LBS | HEART RATE: 84 BPM | DIASTOLIC BLOOD PRESSURE: 68 MMHG | HEIGHT: 60 IN | SYSTOLIC BLOOD PRESSURE: 102 MMHG

## 2025-02-06 DIAGNOSIS — R79.89 LOW VITAMIN D LEVEL: ICD-10-CM

## 2025-02-06 DIAGNOSIS — E53.8 LOW SERUM VITAMIN B12: ICD-10-CM

## 2025-02-06 DIAGNOSIS — F43.9 STRESS: ICD-10-CM

## 2025-02-06 DIAGNOSIS — C73 THYROID CANCER (HCC): ICD-10-CM

## 2025-02-06 DIAGNOSIS — R53.83 FATIGUE, UNSPECIFIED TYPE: ICD-10-CM

## 2025-02-06 DIAGNOSIS — R68.2 DRY MOUTH: ICD-10-CM

## 2025-02-06 DIAGNOSIS — E07.9 THYROID DISEASE: ICD-10-CM

## 2025-02-06 DIAGNOSIS — R74.01 HIGH LIVER TRANSAMINASE LEVEL: ICD-10-CM

## 2025-02-06 DIAGNOSIS — E78.5 DYSLIPIDEMIA: ICD-10-CM

## 2025-02-06 DIAGNOSIS — E89.0 S/P COMPLETE THYROIDECTOMY: ICD-10-CM

## 2025-02-06 DIAGNOSIS — E89.0 HYPOTHYROIDISM, POSTSURGICAL: ICD-10-CM

## 2025-02-06 DIAGNOSIS — E11.42 DIABETIC PERIPHERAL NEUROPATHY (HCC): ICD-10-CM

## 2025-02-06 DIAGNOSIS — E89.0 HYPOTHYROIDISM, POSTSURGICAL: Primary | ICD-10-CM

## 2025-02-06 DIAGNOSIS — I10 PRIMARY HYPERTENSION: ICD-10-CM

## 2025-02-06 DIAGNOSIS — E61.1 LOW IRON: ICD-10-CM

## 2025-02-06 LAB — TSI SER-ACNC: 0.63 UIU/ML (ref 0.55–4.78)

## 2025-02-06 PROCEDURE — 99214 OFFICE O/P EST MOD 30 MIN: CPT | Performed by: INTERNAL MEDICINE

## 2025-02-06 PROCEDURE — 84432 ASSAY OF THYROGLOBULIN: CPT

## 2025-02-06 PROCEDURE — 84443 ASSAY THYROID STIM HORMONE: CPT

## 2025-02-06 PROCEDURE — 86800 THYROGLOBULIN ANTIBODY: CPT

## 2025-02-06 PROCEDURE — 36415 COLL VENOUS BLD VENIPUNCTURE: CPT

## 2025-02-06 RX ORDER — DOCUSATE SODIUM 100 MG/1
100 CAPSULE, LIQUID FILLED ORAL 2 TIMES DAILY
Qty: 180 CAPSULE | Refills: 0 | Status: SHIPPED | OUTPATIENT
Start: 2025-02-06 | End: 2025-05-07

## 2025-02-06 RX ORDER — FERROUS SULFATE 325(65) MG
325 TABLET ORAL 2 TIMES DAILY
Qty: 180 TABLET | Refills: 0 | Status: SHIPPED | OUTPATIENT
Start: 2025-02-06 | End: 2025-05-07

## 2025-02-06 NOTE — PATIENT INSTRUCTIONS
In 3 mo  labs and follow up       Follow up with psych  US in 6/2025     TSH is at goal      Ozempic 1 mg weekly  Metformin   Farxiga- ok to stop if getting dry mouth  Zetia    B12 - getting injections     vit D daily - D3 1000 unit/day    Low iron. Will start iron and stool softener       Thyroid medication dose: Synthroid 125 mcg /day.   Take you medication on empty stomach, not with any other medication or food. Wait 60 minutes before eating. Wait 4 hours before taking Vitamins, Calcium or iron. On the morning of the lab test, please take the medication after the blood test not before. Do not take Biotin 1 week before blood test.      Did not tolerate atorvastatin and does not want to try them again

## 2025-02-06 NOTE — PROGRESS NOTES
Reason for Visit:  thyroid cancer, hypothyroid, DM, obese, DLP, statin intolerance  Requesting Physician:   ..HARSH MALHOTRA      CHIEF COMPLAINT:    Chief Complaint   Patient presents with    Diabetes     F/u        HISTORY OF PRESENT ILLNESS:   Paula Hugo is a 44 year old female who presents with thyroid cancer, PTC stage I, hypothyroid, DM, obese, DLP, statin intolerance and low vit D    Justin Marks MD ENT     Feels BP is low but checking BP at home and it is normal   Energy is better   Stopped ozempic 2 weeks ago d/t dry mouth   She is on MTF, farxiga,     12/24/2024  TSH 0.3  B12 277  FT4 1.19    Vit D 27  Iron sat 26  TIBC 515 H  Transferrin 368 H    Start D3, B12 and iron over           Now on Synthroid 125 mcg /day as rec'   Feels better on brand name     We discussed labs and US   We reviewed images and discussed findings. Still has Rt thyroid bed residual. Tg levels still high but downtrending.        Thyroid cancer history   9/11/2023 Right mid thyroid nodule, FNA - PTC ([place] [facility])  10/26/2023 Bilateral Total Thyroidectomy  by Justin Can MD      12/2023 US   . There is soft tissue mass in area of right thyroidectomy which has suspicious imaging appearance on ultrasound and should be further evaluated with CT or I 131 imaging study.   . Findings in left neck in thyroid region are probably granulation tissue but nonspecific.   . Mildly enlarged left submandibular and node is noted.  This does maintain normal lymph node architecture although should be correlated clinically.     12/8/2023 TSH >100, Tg 143, neg TgAb  12/26/023   on 50 mcg Synthroid  1/11/2024 TSH 3.1  1/16/2024 TSH 1.39 125 mcg Synthroid    12/2023 US post op month #2 when her TSH was > 100 which showed possible Rt thyroid bed residual.   2/2024 TSH 0.059  FT41.7  Tg 8.9 TgAb <1.0   3/28/2024 US right thyroid bed is a hypoechoic, 0.9 x 0.9 x 1.6 versus 1 x 1.1 x 1.7 cm nodule.  Within the left thyroid bed best seen  on trans imaging is a stable 4 x 5 versus 4 x 6 mm echogenic area.   4/2/2024 TSH 0.8 , Tg 13.9, TgAb <1    6/10/2024  CURRY treatment 106.9 mCi I-131   6/17/2024 Post tx scan There is radiotracer accumulation within the thyroid bed bilaterally which could reflect recurrent or residual neoplasm and/or residual thyroid tissue.  No definitive distant metastatic disease.   7/30/2024 TSH 0.5, FT4 1.4, Tg 18, TgAb<1  8/8/2024 rt thyroid bed residual is smaller now   8/2024 head/neck US showed  1.3 x 0.9 x 0.8 cm hypoechoic structure within the right thyroidectomy bed, stable to minimally decreased in size from prior.   Within the right upper lateral neck there is a 1.9 x 0.9 x 1.4 cm lymph node which demonstrates a fatty hilum and thin cortex. Within the right submandibular region there is a 1.6 x 0.6 x 1.3 cm lymph node with fatty hilum and thin cortex.  Within the   left submandibular region there is a 1.2 x 0.5 x 1.0 cm lymph node with fatty hilum and thin cortex.   The previously identified lesion in the left thyroidectomy bed is not identified on the current exam.  Attention on follow-up is recommended.     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  -Tumor is confined to the thyroid parenchyma.  -Margins of resection are free of tumor.  -Single right perithyroidal lymph node, negative for metastatic carcinoma.           SPECIMEN   Procedure  Total thyroidectomy   TUMOR   Tumor Focality  Unifocal   Tumor Characteristics     Tumor Site  Right lobe   Tumor Size  Greatest Dimension (Centimeters): 2.5 cm   Histologic Tumor Types and Subtypes  Papillary thyroid carcinoma   Tumor Proliferative Activity     Mitotic Rate  Less than 3 mitoses per 2mm2   Tumor Necrosis  Not identified   Angioinvasion  (vascular invasion)  Not identified   Lymphatic Invasion  Not identified   Perineural Invasion  Not identified   Extrathyroidal Extension  Not identified   Margin Status  All margins negative for carcinoma   Distance from Invasive Carcinoma to Closest Margin  Less than 1 mm   REGIONAL LYMPH NODES   Regional Lymph Node Status  All regional lymph nodes negative for tumor   Number of Lymph Nodes Examined  1   Matt Level(s) Examined  Level VI   pTNM CLASSIFICATION (AJCC 8th Edition)   Reporting of pT, pN, and (when applicable) pM categories is based on information available to the pathologist at the time the report is issued. As per the AJCC (Chapter 1, 8th Ed.) it is the managing physician’s responsibility to establish the final pathologic stage based upon all pertinent information, including but potentially not limited to this pathology report.        pT Category  pT2   pN Category  pN0a   ADDITIONAL FINDINGS   Additional Findings  None identified     DM   On metformin, farxiga, ozempic 1 mg/week     Lab Results   Component Value Date    A1C 6.3 (A) 12/05/2024    A1C 6.0 (H) 07/30/2024    A1C 7.9 (H) 10/28/2023    A1C 7.5 (H) 12/17/2019      Micro Albumen/Creatinine:    Lab Results   Component Value Date    MICROALBCREA 9.8 07/30/2024           DLP On zetia   She did not tolerate statin- forgetfulness. Does not want to try them again.        Low vit D3   Low b12 - had an inj 1/2025  Low iron - on iron tablets Q 3 days       ASSESSMENT AND PLAN:  44 year old woman with PTC stage I T2 N0 M0, s/p CURRY 106 and post tx scan no distant mets.  post op hypothyroid, DM, obese, DLP, statin intolerance, low b12 and low vit D    10/2023 pathology showed 2.5 cm PTC Rt side, no ETE/vascular/lymphatic/perineural invasion, negative margins, 0/1 LN VI,      Her initial recurrence risk per PRAKASH was ~ 5% based on pathology alone. Her post op w/u showed high Tg levels ( stimulated 143 and non-stimulated 8.9)    3/2024 US thyroid also  showed some possible BL thyroid bed residual.      6/10/2024  CURRY treatment 106.9 mCi I-131   6/17/2024 Post tx scan There is radiotracer accumulation within the thyroid bed bilaterally which could reflect recurrent or residual neoplasm and/or residual thyroid tissue.  No definitive distant metastatic disease.        Clinically, euthyroid  with nonspecific symptoms. We discussed again.   Biochemically,   7/30/2024 TSH 0.5, FT4 1.4, Tg 18, TgAb<1  12/5/2024 TSH 0.4 , Tg 8  on 125 mcg Synthroid     12/2024 stable 1.3 x 0.9 x 0.8 cm hypoechoic structure within the right thyroidectomy bed.   Within the right upper lateral neck there is a minimally smaller 1.5 x 0.6 x 1.4 cm lymph node previously measured 1.9 x 0.9 x 1.4 cm which demonstrates a fatty hilum and thin cortex.    Left thyroid bed:  There is a small focal area of granulation tissue noted in the left thyroid bed measuring 0.7 x 0.5 cm unchanged compared to a few of the previous studies.               Plan   In 3 mo  labs and follow up       Follow up with psych  US in 6/2025     TSH is at goal      Ozempic 1 mg weekly  Metformin   Farxiga- ok to stop if getting dry mouth  Zetia    B12 - getting injections     vit D daily - D3 1000 unit/day    Low iron. Will start iron and stool softener       Thyroid medication dose: Synthroid 125 mcg /day.   Take you medication on empty stomach, not with any other medication or food. Wait 60 minutes before eating. Wait 4 hours before taking Vitamins, Calcium or iron. On the morning of the lab test, please take the medication after the blood test not before. Do not take Biotin 1 week before blood test.      Did not tolerate atorvastatin and does not want to try them again        PAST MEDICAL HISTORY:   Past Medical History:    Diabetes (HCC)    Disorder of thyroid    High blood pressure    High cholesterol    History of COVID-19    Had flu like S/S x 2-3 days.    Hypothyroidism    Neuropathy    feet    PONV (postoperative  nausea and vomiting)    Visual impairment    glasses       PAST SURGICAL HISTORY:   Past Surgical History:   Procedure Laterality Date      2002      06/15/2005      2007      10/17/2013   10/2023 thyroidectomy      CURRENT MEDICATIONS:    Current Outpatient Medications   Medication Sig Dispense Refill    Ferrous Sulfate 325 (65 Fe) MG Oral Tab Take 1 tablet (325 mg total) by mouth in the morning and 1 tablet (325 mg total) before bedtime. 180 tablet 0    docusate sodium 100 MG Oral Cap Take 1 capsule (100 mg total) by mouth 2 (two) times daily. 180 capsule 0    METFORMIN  MG Oral Tablet 24 Hr TAKE 1 TABLET (500 MG TOTAL) BY MOUTH 2 (TWO) TIMES DAILY WITH MEALS. CAN HOLD UNTIL DIET IMPROVES 180 tablet 0    SYNTHROID 125 MCG Oral Tab Take 1 tablet (125 mcg total) by mouth before breakfast. Synthroid brand name only 90 tablet 1    FARXIGA 10 MG Oral Tab Take 1 tablet (10 mg total) by mouth daily. Can hold until diet improves 30 tablet 5    ezetimibe 10 MG Oral Tab Take 1 tablet (10 mg total) by mouth nightly.      Blood Glucose Monitoring Suppl (ACCU-CHEK GUIDE ME) w/Device Does not apply Kit USE AS DIRECTED 1 kit 0    pantoprazole 40 MG Oral Tab EC Take 1 tablet (40 mg total) by mouth before breakfast. (Patient not taking: Reported on 2025) 90 tablet 0    Glucose Blood (ACCU-CHEK GUIDE TEST) In Vitro Strip Use to check blood glucose twice daily. Dx E11.65. 200 each 1    Accu-Chek Softclix Lancets Does not apply Misc Use to check blood glucose twice daily. Dx E11.65. 200 each 1    Cevimeline HCl 30 MG Oral Cap Take 1 capsule (30 mg total) by mouth 3 (three) times daily. 270 capsule 1    Xylitol (XYLIMELTS) 500 MG Mouth/Throat Disk Use as directed 1 each in the mouth or throat daily. 90 each 0    semaglutide (OZEMPIC, 1 MG/DOSE,) 4 MG/3ML Subcutaneous Solution Pen-injector Inject 1 mg into the skin once a week. (Patient not taking: Reported on 2025) 9 mL 1     losartan 25 MG Oral Tab Take 1 tablet (25 mg total) by mouth at bedtime. (Patient not taking: Reported on 2/6/2025) 90 tablet 0    Cevimeline HCl 30 MG Oral Cap Take 1 capsule (30 mg total) by mouth daily. 90 capsule 0    ondansetron (ZOFRAN) 4 mg tablet Take 1 tablet (4 mg total) by mouth every 8 (eight) hours as needed for Nausea. (Patient not taking: Reported on 8/20/2024) 12 tablet 0    Continuous Glucose Sensor (DEXCOM G7 SENSOR) Does not apply Misc 1 each Every 10 days. Use as directed every 10 days 3 each 11    LORazepam 0.5 MG Oral Tab Take 0.5-1 tablets (0.25-0.5 mg total) by mouth. (Patient not taking: Reported on 12/5/2024)      ergocalciferol 1.25 MG (11593 UT) Oral Cap Take 1 capsule (50,000 Units total) by mouth twice a week.      oxyCODONE 5 MG Oral Tab Take 1 tablet (5 mg total) by mouth every 4 (four) hours as needed. 20 tablet 0     No medication comments found.   Ferrous Sulfate 325 (65 Fe) MG Oral Tab Take 1 tablet (325 mg total) by mouth in the morning and 1 tablet (325 mg total) before bedtime. 180 tablet 0    docusate sodium 100 MG Oral Cap Take 1 capsule (100 mg total) by mouth 2 (two) times daily. 180 capsule 0    METFORMIN  MG Oral Tablet 24 Hr TAKE 1 TABLET (500 MG TOTAL) BY MOUTH 2 (TWO) TIMES DAILY WITH MEALS. CAN HOLD UNTIL DIET IMPROVES 180 tablet 0    SYNTHROID 125 MCG Oral Tab Take 1 tablet (125 mcg total) by mouth before breakfast. Synthroid brand name only 90 tablet 1    FARXIGA 10 MG Oral Tab Take 1 tablet (10 mg total) by mouth daily. Can hold until diet improves 30 tablet 5    ezetimibe 10 MG Oral Tab Take 1 tablet (10 mg total) by mouth nightly.          ALLERGIES:  Allergies   Allergen Reactions    Penicillins HIVES       SOCIAL HISTORY:    Social History     Socioeconomic History    Marital status:    Tobacco Use    Smoking status: Never    Smokeless tobacco: Never   Vaping Use    Vaping status: Never Used   Substance and Sexual Activity    Alcohol use: Not  Currently    Drug use: Never   Other Topics Concern    Caffeine Concern Yes     Comment: 1-2 cups a day    Special Diet Yes     Comment: 2-3 x week       FAMILY HISTORY:   Family History   Problem Relation Age of Onset    Diabetes Neg              PHYSICAL EXAM:   Height: 5' (152.4 cm) (02/06 1101)  Weight: 162 lb (73.5 kg) (02/06 1101)  BSA (Calculated - sq m): 1.71 sq meters (02/06 1101)  Pulse: 84 (02/06 1101)  BP: 102/68 (02/06 1101)  Temp: --  Do Not Use - Resp Rate: --  SpO2: --    Body mass index is 31.64 kg/m².  Wt Readings from Last 6 Encounters:   02/06/25 162 lb (73.5 kg)   12/05/24 160 lb (72.6 kg)   09/24/24 157 lb (71.2 kg)   08/20/24 156 lb 9.6 oz (71 kg)   07/12/24 155 lb (70.3 kg)   06/04/24 155 lb 6.4 oz (70.5 kg)          Well healed wound at the base of the neck   No LAP on exam       Neck/Thyroid: neck inspection:  + scar,       DATA:   Pertinent data reviewed   12/27/24 10:55   US HEAD/NECK (CPT=76536) Rpt   Rpt: View report in Results Review for more information stable 1.3 x 0.9 x 0.8 cm hypoechoic structure within the right thyroidectomy bed.   Within the right upper lateral neck there is a minimally smaller 1.5 x 0.6 x 1.4 cm lymph node previously measured 1.9 x 0.9 x 1.4 cm which demonstrates a fatty hilum and thin cortex.    Left thyroid bed:  There is a small focal area of granulation tissue noted in the left thyroid bed measuring 0.7 x 0.5 cm unchanged compared to a few of the previous studies.    Latest Reference Range & Units 12/05/24 11:39   T4,Free (Direct) 0.8 - 1.7 ng/dL 1.5   TSH 0.550 - 4.780 uIU/mL 0.440 (L)   T3 FREE 2.40 - 4.20 pg/mL 2.98   Thyroglob Ab 0.0 - 0.9 IU/mL <1.0   Thyroglobulin, LIA 1.5 - 38.5 ng/mL 8.2   (L): Data is abnormally low      Latest Reference Range & Units 12/08/23 12:40 02/20/24 10:46   Thyroglob Ab 0.0 - 0.9 IU/mL <1.0 <1.0   Thyroglob LIA 1.5 - 38.5 ng/mL 143.4 (H) 8.9   (H): Data is abnormally high   Latest Reference Range & Units 07/30/24 11:27    Glucose 70 - 99 mg/dL 114 (H)   HEMOGLOBIN A1c <5.7 % 6.0 (H)   ESTIMATED AVERAGE GLUCOSE 68 - 126 mg/dL 126   Sodium 136 - 145 mmol/L 136   Potassium 3.5 - 5.1 mmol/L 4.0   Chloride 98 - 112 mmol/L 106   Carbon Dioxide, Total 21.0 - 32.0 mmol/L 25.0   BUN 9 - 23 mg/dL 13   CREATININE 0.55 - 1.02 mg/dL 0.47 (L)   CALCIUM 8.7 - 10.4 mg/dL 9.3   EGFR >=60 mL/min/1.73m2 121   ANION GAP 0 - 18 mmol/L 5   CALCULATED OSMOLALITY 275 - 295 mOsm/kg 283   ALKALINE PHOSPHATASE 37 - 98 U/L 85   AST (SGOT) <34 U/L 38 (H)   ALT (SGPT) 10 - 49 U/L 53 (H)   Total Bilirubin 0.3 - 1.2 mg/dL 0.9   Globulin 2.0 - 3.5 g/dL 2.5   Magnesium, Serum 1.6 - 2.6 mg/dL 2.1   (   Latest Reference Range & Units 07/30/24 11:27   T4,Free (Direct) 0.8 - 1.7 ng/dL 1.4   TSH 0.550 - 4.780 mIU/mL 0.552      Latest Reference Range & Units 07/30/24 11:27   CREATININE UR RANDOM mg/dL 71.30   MALB URINE mg/dL 0.70   MALB/CRE CALC <=30.0 ug/mg 9.8      Latest Reference Range & Units 07/30/24 11:27   Thyroglob Ab 0.0 - 0.9 IU/mL <1.0   Thyroglobulin, LIA 1.5 - 38.5 ng/mL 18.8       No results for input(s): \"TSH\", \"T4F\", \"T3F\", \"THYP\" in the last 72 hours.      No results found.            Orders Placed This Encounter   Procedures    Thyroglobulin Antibody and Tumor Marker    TSH W Reflex To Free T4    TSH W Reflex To Free T4    Thyroglobulin Antibody and Tumor Marker     Orders Placed This Encounter    Thyroglobulin Antibody and Tumor Marker     Standing Status:   Future     Number of Occurrences:   1     Standing Expiration Date:   2/6/2026     Order Specific Question:   Release to patient     Answer:   Immediate    TSH W Reflex To Free T4     Standing Status:   Future     Number of Occurrences:   1     Standing Expiration Date:   2/6/2026     Order Specific Question:   Release to patient     Answer:   Immediate    TSH W Reflex To Free T4     Standing Status:   Future     Number of Occurrences:   1     Standing Expiration Date:   2/6/2026     Order  Specific Question:   Release to patient     Answer:   Immediate    Thyroglobulin Antibody and Tumor Marker     Standing Status:   Future     Standing Expiration Date:   2/6/2026     Order Specific Question:   Release to patient     Answer:   Immediate    Ferrous Sulfate 325 (65 Fe) MG Oral Tab     Sig: Take 1 tablet (325 mg total) by mouth in the morning and 1 tablet (325 mg total) before bedtime.     Dispense:  180 tablet     Refill:  0    docusate sodium 100 MG Oral Cap     Sig: Take 1 capsule (100 mg total) by mouth 2 (two) times daily.     Dispense:  180 capsule     Refill:  0          This is a specialized patient consultation in endocrinology and required comprehensive review of prior records, as well as current evaluation, with time required for consideration of complex endocrine issues and consultation. For this visit, I personally interviewed the patient, and family member if accompanied, performed the pertinent parts of the history and physical examination. ROS included screening for appropriate endocrine conditions.   Today's diagnosis and plan were reviewed in detail with the patient who states understanding and agrees with plan. I discussed with the patient possible diagnosis, differential diagnosis, need for work up , treatment options, alternatives and side effects.     Please see note for details about time spent which includes:   · pre-visit preparation  · reviewing records  · face to face time with the patient   · timely documentation of the encounter  · ordering medications/tests  · communication with care team  · care coordination    I appreciate the opportunity to be part of your patient's medical care and will keep you, as the referring and primary physicians, informed about the care of your patient, including possible future surgery and pathology findings. Please feel free to contact me should you have any questions.      Leigh Taylor MD

## 2025-02-10 LAB
THYROGLOB AB: <1 IU/ML
THYROGLOB IMA: 21.5 NG/ML

## 2025-02-14 DIAGNOSIS — E07.9 THYROID DISEASE: Primary | ICD-10-CM

## 2025-03-07 ENCOUNTER — APPOINTMENT (OUTPATIENT)
Dept: CT IMAGING | Facility: HOSPITAL | Age: 45
End: 2025-03-07
Attending: EMERGENCY MEDICINE
Payer: COMMERCIAL

## 2025-03-07 ENCOUNTER — HOSPITAL ENCOUNTER (INPATIENT)
Facility: HOSPITAL | Age: 45
LOS: 1 days | Discharge: HOME | End: 2025-03-08
Attending: EMERGENCY MEDICINE | Admitting: INTERNAL MEDICINE
Payer: COMMERCIAL

## 2025-03-07 ENCOUNTER — APPOINTMENT (OUTPATIENT)
Dept: GENERAL RADIOLOGY | Facility: HOSPITAL | Age: 45
End: 2025-03-07
Attending: EMERGENCY MEDICINE
Payer: COMMERCIAL

## 2025-03-07 ENCOUNTER — APPOINTMENT (OUTPATIENT)
Dept: MRI IMAGING | Facility: HOSPITAL | Age: 45
End: 2025-03-07
Attending: EMERGENCY MEDICINE
Payer: COMMERCIAL

## 2025-03-07 DIAGNOSIS — E11.42 DIABETIC PERIPHERAL NEUROPATHY (HCC): ICD-10-CM

## 2025-03-07 DIAGNOSIS — E78.2 MIXED HYPERLIPIDEMIA: ICD-10-CM

## 2025-03-07 DIAGNOSIS — E89.0 S/P COMPLETE THYROIDECTOMY: ICD-10-CM

## 2025-03-07 DIAGNOSIS — E11.65 UNCONTROLLED TYPE 2 DIABETES MELLITUS WITH HYPERGLYCEMIA (HCC): ICD-10-CM

## 2025-03-07 DIAGNOSIS — E89.0 HYPOTHYROIDISM, POSTSURGICAL: ICD-10-CM

## 2025-03-07 DIAGNOSIS — I10 PRIMARY HYPERTENSION: ICD-10-CM

## 2025-03-07 DIAGNOSIS — C73 THYROID CANCER (HCC): ICD-10-CM

## 2025-03-07 DIAGNOSIS — E11.21 DIABETIC NEPHROPATHY ASSOCIATED WITH TYPE 2 DIABETES MELLITUS (HCC): ICD-10-CM

## 2025-03-07 DIAGNOSIS — E07.9 THYROID DISEASE: ICD-10-CM

## 2025-03-07 DIAGNOSIS — R79.89 LOW VITAMIN D LEVEL: ICD-10-CM

## 2025-03-07 DIAGNOSIS — R74.01 HIGH LIVER TRANSAMINASE LEVEL: ICD-10-CM

## 2025-03-07 DIAGNOSIS — R51.9 ACUTE NONINTRACTABLE HEADACHE, UNSPECIFIED HEADACHE TYPE: ICD-10-CM

## 2025-03-07 DIAGNOSIS — E11.65 TYPE 2 DIABETES MELLITUS WITH HYPERGLYCEMIA, WITHOUT LONG-TERM CURRENT USE OF INSULIN (HCC): ICD-10-CM

## 2025-03-07 DIAGNOSIS — R53.1 LEFT-SIDED WEAKNESS: Primary | ICD-10-CM

## 2025-03-07 DIAGNOSIS — E78.5 DYSLIPIDEMIA: ICD-10-CM

## 2025-03-07 DIAGNOSIS — R73.09 HIGH GLUCOSE LEVEL: ICD-10-CM

## 2025-03-07 DIAGNOSIS — E53.8 LOW SERUM VITAMIN B12: ICD-10-CM

## 2025-03-07 PROBLEM — R20.2 PARESTHESIA: Status: ACTIVE | Noted: 2025-03-07

## 2025-03-07 PROBLEM — E03.9 ACQUIRED HYPOTHYROIDISM: Status: ACTIVE | Noted: 2025-03-07

## 2025-03-07 PROBLEM — K21.9 GASTROESOPHAGEAL REFLUX DISEASE: Status: ACTIVE | Noted: 2025-03-07

## 2025-03-07 PROBLEM — Z85.850 HISTORY OF THYROID CANCER: Status: ACTIVE | Noted: 2025-03-07

## 2025-03-07 LAB
ALBUMIN SERPL-MCNC: 5.1 G/DL (ref 3.2–4.8)
ALBUMIN/GLOB SERPL: 2.1 {RATIO} (ref 1–2)
ALP LIVER SERPL-CCNC: 88 U/L
ALT SERPL-CCNC: 49 U/L
ANION GAP SERPL CALC-SCNC: 10 MMOL/L (ref 0–18)
APTT PPP: 29.8 SECONDS (ref 23–36)
AST SERPL-CCNC: 39 U/L (ref ?–34)
ATRIAL RATE: 145 BPM
BASOPHILS # BLD AUTO: 0.04 X10(3) UL (ref 0–0.2)
BASOPHILS NFR BLD AUTO: 0.7 %
BILIRUB SERPL-MCNC: 0.5 MG/DL (ref 0.3–1.2)
BUN BLD-MCNC: 10 MG/DL (ref 9–23)
CALCIUM BLD-MCNC: 9.7 MG/DL (ref 8.7–10.6)
CHLORIDE SERPL-SCNC: 101 MMOL/L (ref 98–112)
CHOLEST SERPL-MCNC: 232 MG/DL (ref ?–200)
CO2 SERPL-SCNC: 27 MMOL/L (ref 21–32)
CREAT BLD-MCNC: 0.56 MG/DL
CRP SERPL-MCNC: <0.4 MG/DL (ref ?–0.5)
EGFRCR SERPLBLD CKD-EPI 2021: 115 ML/MIN/1.73M2 (ref 60–?)
EOSINOPHIL # BLD AUTO: 0.14 X10(3) UL (ref 0–0.7)
EOSINOPHIL NFR BLD AUTO: 2.5 %
ERYTHROCYTE [DISTWIDTH] IN BLOOD BY AUTOMATED COUNT: 13.2 %
ERYTHROCYTE [SEDIMENTATION RATE] IN BLOOD: 17 MM/HR
GLOBULIN PLAS-MCNC: 2.4 G/DL (ref 2–3.5)
GLUCOSE BLD-MCNC: 105 MG/DL (ref 70–99)
GLUCOSE BLD-MCNC: 105 MG/DL (ref 70–99)
GLUCOSE BLD-MCNC: 177 MG/DL (ref 70–99)
GLUCOSE BLD-MCNC: 180 MG/DL (ref 70–99)
HCT VFR BLD AUTO: 44.5 %
HDLC SERPL-MCNC: 39 MG/DL (ref 40–59)
HGB BLD-MCNC: 15.3 G/DL
IMM GRANULOCYTES # BLD AUTO: 0.01 X10(3) UL (ref 0–1)
IMM GRANULOCYTES NFR BLD: 0.2 %
INR BLD: 0.93 (ref 0.8–1.2)
LDLC SERPL CALC-MCNC: 163 MG/DL (ref ?–100)
LYMPHOCYTES # BLD AUTO: 1.66 X10(3) UL (ref 1–4)
LYMPHOCYTES NFR BLD AUTO: 29.4 %
MCH RBC QN AUTO: 28.4 PG (ref 26–34)
MCHC RBC AUTO-ENTMCNC: 34.4 G/DL (ref 31–37)
MCV RBC AUTO: 82.6 FL
MONOCYTES # BLD AUTO: 0.46 X10(3) UL (ref 0.1–1)
MONOCYTES NFR BLD AUTO: 8.1 %
NEUTROPHILS # BLD AUTO: 3.34 X10 (3) UL (ref 1.5–7.7)
NEUTROPHILS # BLD AUTO: 3.34 X10(3) UL (ref 1.5–7.7)
NEUTROPHILS NFR BLD AUTO: 59.1 %
NONHDLC SERPL-MCNC: 193 MG/DL (ref ?–130)
OSMOLALITY SERPL CALC.SUM OF ELEC: 290 MOSM/KG (ref 275–295)
P AXIS: 60 DEGREES
P-R INTERVAL: 120 MS
PLATELET # BLD AUTO: 271 10(3)UL (ref 150–450)
POTASSIUM SERPL-SCNC: 4 MMOL/L (ref 3.5–5.1)
PROT SERPL-MCNC: 7.5 G/DL (ref 5.7–8.2)
PROTHROMBIN TIME: 12.6 SECONDS (ref 11.6–14.8)
Q-T INTERVAL: 264 MS
QRS DURATION: 70 MS
QTC CALCULATION (BEZET): 410 MS
R AXIS: 55 DEGREES
RBC # BLD AUTO: 5.39 X10(6)UL
SODIUM SERPL-SCNC: 138 MMOL/L (ref 136–145)
T AXIS: 129 DEGREES
TRIGL SERPL-MCNC: 161 MG/DL (ref 30–149)
TROPONIN I SERPL HS-MCNC: <3 NG/L
TSI SER-ACNC: 1.34 UIU/ML (ref 0.55–4.78)
VENTRICULAR RATE: 145 BPM
VIT B12 SERPL-MCNC: 555 PG/ML (ref 211–911)
VLDLC SERPL CALC-MCNC: 32 MG/DL (ref 0–30)
WBC # BLD AUTO: 5.7 X10(3) UL (ref 4–11)

## 2025-03-07 PROCEDURE — 71045 X-RAY EXAM CHEST 1 VIEW: CPT | Performed by: EMERGENCY MEDICINE

## 2025-03-07 PROCEDURE — 0042T CT STROKE(DAWN BRAIN) PERFUSION ONLY(CPT=0042T): CPT | Performed by: EMERGENCY MEDICINE

## 2025-03-07 PROCEDURE — 70551 MRI BRAIN STEM W/O DYE: CPT | Performed by: EMERGENCY MEDICINE

## 2025-03-07 PROCEDURE — 70496 CT ANGIOGRAPHY HEAD: CPT | Performed by: EMERGENCY MEDICINE

## 2025-03-07 PROCEDURE — 70450 CT HEAD/BRAIN W/O DYE: CPT | Performed by: EMERGENCY MEDICINE

## 2025-03-07 PROCEDURE — 99223 1ST HOSP IP/OBS HIGH 75: CPT | Performed by: INTERNAL MEDICINE

## 2025-03-07 PROCEDURE — 70498 CT ANGIOGRAPHY NECK: CPT | Performed by: EMERGENCY MEDICINE

## 2025-03-07 RX ORDER — ENOXAPARIN SODIUM 100 MG/ML
40 INJECTION SUBCUTANEOUS DAILY
Status: DISCONTINUED | OUTPATIENT
Start: 2025-03-08 | End: 2025-03-08

## 2025-03-07 RX ORDER — LABETALOL HYDROCHLORIDE 5 MG/ML
10 INJECTION, SOLUTION INTRAVENOUS EVERY 10 MIN PRN
Status: DISCONTINUED | OUTPATIENT
Start: 2025-03-07 | End: 2025-03-08

## 2025-03-07 RX ORDER — KETOROLAC TROMETHAMINE 15 MG/ML
15 INJECTION, SOLUTION INTRAMUSCULAR; INTRAVENOUS ONCE
Status: COMPLETED | OUTPATIENT
Start: 2025-03-07 | End: 2025-03-07

## 2025-03-07 RX ORDER — ACETAMINOPHEN 500 MG
500 TABLET ORAL EVERY 4 HOURS PRN
Status: DISCONTINUED | OUTPATIENT
Start: 2025-03-07 | End: 2025-03-08

## 2025-03-07 RX ORDER — ASPIRIN 300 MG/1
300 SUPPOSITORY RECTAL DAILY
Status: DISCONTINUED | OUTPATIENT
Start: 2025-03-07 | End: 2025-03-08

## 2025-03-07 RX ORDER — METOCLOPRAMIDE HYDROCHLORIDE 5 MG/ML
10 INJECTION INTRAMUSCULAR; INTRAVENOUS ONCE
Status: COMPLETED | OUTPATIENT
Start: 2025-03-07 | End: 2025-03-07

## 2025-03-07 RX ORDER — ASPIRIN 325 MG
325 TABLET ORAL DAILY
Status: DISCONTINUED | OUTPATIENT
Start: 2025-03-07 | End: 2025-03-08

## 2025-03-07 RX ORDER — NICOTINE POLACRILEX 4 MG
15 LOZENGE BUCCAL
Status: DISCONTINUED | OUTPATIENT
Start: 2025-03-07 | End: 2025-03-08

## 2025-03-07 RX ORDER — ASPIRIN 325 MG
325 TABLET ORAL DAILY
Status: DISCONTINUED | OUTPATIENT
Start: 2025-03-07 | End: 2025-03-07

## 2025-03-07 RX ORDER — DIPHENHYDRAMINE HYDROCHLORIDE 50 MG/ML
50 INJECTION, SOLUTION INTRAMUSCULAR; INTRAVENOUS ONCE
Status: COMPLETED | OUTPATIENT
Start: 2025-03-07 | End: 2025-03-07

## 2025-03-07 RX ORDER — SODIUM CHLORIDE 9 MG/ML
INJECTION, SOLUTION INTRAVENOUS CONTINUOUS
Status: DISCONTINUED | OUTPATIENT
Start: 2025-03-07 | End: 2025-03-08

## 2025-03-07 RX ORDER — HYDRALAZINE HYDROCHLORIDE 20 MG/ML
10 INJECTION INTRAMUSCULAR; INTRAVENOUS EVERY 2 HOUR PRN
Status: DISCONTINUED | OUTPATIENT
Start: 2025-03-07 | End: 2025-03-08

## 2025-03-07 RX ORDER — ATORVASTATIN CALCIUM 40 MG/1
40 TABLET, FILM COATED ORAL NIGHTLY
Status: DISCONTINUED | OUTPATIENT
Start: 2025-03-07 | End: 2025-03-08

## 2025-03-07 RX ORDER — DEXTROSE MONOHYDRATE 25 G/50ML
50 INJECTION, SOLUTION INTRAVENOUS
Status: DISCONTINUED | OUTPATIENT
Start: 2025-03-07 | End: 2025-03-08

## 2025-03-07 RX ORDER — ONDANSETRON 2 MG/ML
4 INJECTION INTRAMUSCULAR; INTRAVENOUS EVERY 6 HOURS PRN
Status: DISCONTINUED | OUTPATIENT
Start: 2025-03-07 | End: 2025-03-08

## 2025-03-07 RX ORDER — ERGOCALCIFEROL 1.25 MG/1
50000 CAPSULE, LIQUID FILLED ORAL
COMMUNITY

## 2025-03-07 RX ORDER — NITROGLYCERIN 0.4 MG/1
0.4 TABLET SUBLINGUAL EVERY 5 MIN PRN
Status: DISCONTINUED | OUTPATIENT
Start: 2025-03-07 | End: 2025-03-08

## 2025-03-07 RX ORDER — NICOTINE POLACRILEX 4 MG
30 LOZENGE BUCCAL
Status: DISCONTINUED | OUTPATIENT
Start: 2025-03-07 | End: 2025-03-08

## 2025-03-07 RX ORDER — SODIUM CHLORIDE 9 MG/ML
INJECTION, SOLUTION INTRAVENOUS CONTINUOUS
OUTPATIENT
Start: 2025-03-07 | End: 2025-03-07

## 2025-03-07 RX ORDER — ACETAMINOPHEN 325 MG/1
650 TABLET ORAL EVERY 4 HOURS PRN
Status: DISCONTINUED | OUTPATIENT
Start: 2025-03-07 | End: 2025-03-08

## 2025-03-07 RX ORDER — ACETAMINOPHEN 650 MG/1
650 SUPPOSITORY RECTAL EVERY 4 HOURS PRN
Status: DISCONTINUED | OUTPATIENT
Start: 2025-03-07 | End: 2025-03-08

## 2025-03-07 NOTE — ED PROVIDER NOTES
Patient Seen in: [facility] Emergency Department      History     Chief Complaint   Patient presents with    Swelling    Numbness     Stated Complaint: left side of face numb, pins and needles, HA, lump to neck. last normal 30 don*    Subjective:   HPI      44-year-old female comes to the emergency department with her  for evaluation of left-sided numbness as well as pins-and-needles involving the entire left side of her body that began 30 minutes prior to arrival in the emergency department or approximately 1:45 PM.  Patient also states that she has had a left-sided headache for the past 2 to 3 days.  She does not typically get headaches.  No vision changes, speech difficulty or weakness.    Objective:     Past Medical History:    Diabetes (HCC)    Disorder of thyroid    High blood pressure    High cholesterol    History of COVID-19    Had flu like S/S x 2-3 days.    Hypothyroidism    Neuropathy    feet    PONV (postoperative nausea and vomiting)    Thyroid cancer (HCC)    Visual impairment    glasses              Past Surgical History:   Procedure Laterality Date      2002      06/15/2005      2007      10/17/2013    Thyroidectomy                  Social History     Socioeconomic History    Marital status:    Tobacco Use    Smoking status: Never    Smokeless tobacco: Never   Vaping Use    Vaping status: Never Used   Substance and Sexual Activity    Alcohol use: Never    Drug use: Never   Other Topics Concern    Caffeine Concern Yes     Comment: 1-2 cups a day    Special Diet Yes     Comment: 2-3 x week     Social Drivers of Health     Food Insecurity: No Food Insecurity (3/7/2025)    NCSS - Food Insecurity     Worried About Running Out of Food in the Last Year: No     Ran Out of Food in the Last Year: No   Transportation Needs: No Transportation Needs (3/7/2025)    NCSS - Transportation     Lack of Transportation: No   Housing Stability: Not At  Risk (3/7/2025)    NCSS - Housing/Utilities     Has Housing: Yes     Worried About Losing Housing: No     Unable to Get Utilities: No                  Physical Exam     ED Triage Vitals   BP 03/07/25 1426 120/89   Pulse 03/07/25 1426 106   Resp 03/07/25 1426 17   Temp 03/07/25 1509 98 °F (36.7 °C)   Temp src 03/07/25 1509 Temporal   SpO2 03/07/25 1426 98 %   O2 Device 03/07/25 1426 None (Room air)       Current Vitals:   Vital Signs  BP: 96/70  Pulse: 98  Resp: 18  Temp: 98 °F (36.7 °C)  Temp src: Temporal  MAP (mmHg): 79    Oxygen Therapy  SpO2: 97 %  O2 Device: None (Room air)        Physical Exam     General Appearance: This is an anxious and tearful middle-aged female lying on a gurney.  Vital signs were reviewed per nurses notes.  Monitor reveals a sinus tachycardia rate of 106.  Initial blood pressure was 120/89.  HEENT: Normocephalic/atraumatic.  Anicteric sclera.  Oral mucosa is moist.  Oropharynx is normal.  Facies are symmetrical.  Pupils equal round reactive to light.  Extraocular movements are intact.  Neck is nontender without adenopathy or thyromegaly.  Lungs are clear to auscultation.  Heart exam: Normal S1-S2 without extra sounds or murmurs.  Rapid rate, regular rhythm.  Abdomen is nontender.  Extremities are atraumatic.  Skin is dry without rashes or lesions.  Upon arrival neural exam was as follows: Fluent speech.  Oriented x 4.  No facial droop notable.  No pronator drift.  Weakness of left foot plantar and dorsi flexion.  ED Course     Labs Reviewed   COMP METABOLIC PANEL (14) - Abnormal; Notable for the following components:       Result Value    Glucose 180 (*)     AST 39 (*)     Albumin 5.1 (*)     A/G Ratio 2.1 (*)     All other components within normal limits   CBC WITH DIFFERENTIAL WITH PLATELET - Abnormal; Notable for the following components:    RBC 5.39 (*)     All other components within normal limits   POCT GLUCOSE - Abnormal; Notable for the following components:    POC Glucose 177  (*)     All other components within normal limits   PROTHROMBIN TIME (PT) - Normal   PTT, ACTIVATED - Normal   TROPONIN I HIGH SENSITIVITY - Normal   TROPONIN I HIGH SENSITIVITY - Normal   RAINBOW DRAW LAVENDER   RAINBOW DRAW LIGHT GREEN   RAINBOW DRAW BLUE   RAINBOW DRAW GOLD     EKG    Rate, intervals and axes as noted on EKG Report.  Rate: 145  Rhythm: Sinus tachycardia  Reading: Nonspecific ST and T wave abnormality.  Abnormal EKG.  Agree with EKG report.  Of note, this EKG was obtained after the patient returned from CAT scan and the patient was openly crying.                Code stroke was called and the patient went to CT.    CT STROKE CTA BRAIN/CTA NECK (W IV)(CPT=70496/21078)    Addendum Date: 3/7/2025    ADDENDUM:  Correction of the above time, The case was discussed at 1444 hours on 3/7/2025 with read back.  Dictated by (CST): Christopher Green MD on 3/07/2025 at 2:49 PM     Finalized by (CST): Christopher Green MD on 3/07/2025 at 2:51 PM             Result Date: 3/7/2025  PROCEDURE:  CT STROKE CTA BRAIN/CTA NECK (W IV)(CPT=70496/52217)  COMPARISON:  [facility] , CT, CT STROKE BRAIN (NO IV)(CPT=70450), 3/07/2025, 2:29 PM.  External Exams, CT, CT BRAIN OR HEAD (42930), 11/07/2019, 0:28 AM.  INDICATIONS:  eval for stroke  TECHNIQUE  Noncontrast CT scanning is performed through the brain and CT angiography of the head and neck were obtained with non-ionic contrast. MIP images were obtained. Curved planar reformats were performed through the carotid and vertebral arteries. All measurements obtained in this exam were performed using NASCET. Dose reduction techniques were used. Dose information is transmitted to the ACR (American College of Radiology) NRDR (National Radiology Data Registry) which includes the Dose Index Registry.  PATIENT STATED HISTORY:(As transcribed by Technologist)  Code stroke. Left side/ leg weakness.   CONTRAST USED:  75cc of Isovue 370  FINDINGS:  VASCULATURE:  No significant stenosis.  No visible  aneurysm or vascular malformation. VENTRICLES:  Normal for age.  No enlargement or displacement. CEREBRUM:  Normal for age.  No excessive atrophy, mass, or hemorrhage, or abnormal enhancement. CEREBELLUM:  Normal for age.  No excessive atrophy, mass, or hemorrhage, or abnormal enhancement. BRAINSTEM:  Normal for age.  No excessive atrophy, mass, or hemorrhage, or abnormal enhancement. BASAL CISTERNS:  No subarachnoid hemorrhage or effacement.  SKULL:  Negative.   LEFT INTERNAL CAROTID:  No hemodynamically significant stenosis or dissection. EXTERNAL CAROTID:  No hemodynamically significant stenosis or dissection COMMON CAROTID:  No hemodynamically significant stenosis or dissection VERTEBRAL:  No hemodynamically significant stenosis or dissection  RIGHT INTERNAL CAROTID:   No hemodynamically significant stenosis or dissection EXTERNAL CAROTID:    No hemodynamically significant stenosis or dissection COMMON CAROTID:   No hemodynamically significant stenosis or dissection VERTEBRAL:   No hemodynamically significant stenosis or dissection  OTHER:  The visualized soft tissues of the neck are also unremarkable.              CONCLUSION:   1. No evidence of acute intracranial process.  2. Unremarkable CTA of the [id number]-[id number].  3. Unremarkable CTA of the neck.  Findings were discussed with Dr. Garrett at 1844 hours on 3/7/2025 with read back.   LOCATION:  [facility]   Dictated by (CST): Christopher Green MD on 3/07/2025 at 2:40 PM     Finalized by (CST): Christopher Green MD on 3/07/2025 at 2:45 PM       CT STROKE BRAIN (NO IV)(CPT=70450)    Result Date: 3/7/2025  PROCEDURE:  CT STROKE BRAIN (NO IV)(CPT=70450)  COMPARISON:  External Exams, CT, CT BRAIN OR HEAD ([id number]), 11/07/2019, 0:28 AM.  INDICATIONS:  left side of face numb, pins and needles, HA, lump to neck. last normal 30 minutes ago. left arm and left leg feels weak per patient  TECHNIQUE:  Noncontrast CT scanning is performed through the brain.  Dose reduction techniques  were used. Dose information is transmitted to the ACR (American College of Radiology) NRDR (National Radiology Data Registry) which includes the Dose Index Registry.  PATIENT STATED HISTORY: (As transcribed by Technologist)  Left side weakness. Code stroke    FINDINGS:  VENTRICLES/SULCI:  Ventricles and sulci are normal in size.  INTRACRANIAL:  There are no abnormal extraaxial fluid collections.  There is no midline shift.  There are no intraparenchymal brain abnormalities.  There is nothing specific for acute infarct.  There is no hemorrhage or mass lesion.  Focal calcification may be from old infection noted in the right medial frontal lobe cortex. SINUSES:           No sign of acute sinusitis.  MASTOIDS:          No sign of acute inflammation. SKULL:             No evidence for fracture or osseous abnormality. OTHER:             None.            CONCLUSION:  No evidence of acute intracranial process.  Critical results were called to Dr. Garrett at 1439 hours on 3/7/2025.  There was appropriate read back.    LOCATION:  [facility]   Dictated by (CST): Christopher Green MD on 3/07/2025 at 2:36 PM     Finalized by (CST): Christopher Green MD on 3/07/2025 at 2:39 PM       I personally reviewed the images myself and went over results with patient.    CT plain brain and a CTA of the head and neck were obtained.  I viewed the films myself.  No acute intracranial process and low large vessel occlusion.    Upon reevaluation with the stroke navigator at 1455 the patient's speech was slow but understandable and there was no spontaneous movement of the left leg.    Per stroke neurologist, Dr. Grissom, the patient was taken back for CT perfusion.  Chest x-ray revealed the following findings:  Impression  CONCLUSION:  No acute radiographic findings        LOCATION:  [facility]                 Dictated by (CST): Dick Dasilva MD on 3/07/2025 at 4:54 PM      Finalized by (CST): Dcik Dasilva MD on 3/07/2025 at 4:55 PM     CT perfusion revealed  the following findings:  Impression  CONCLUSION:  There is no evidence of ischemia or infarct by perfusion weighted imaging of the brain.  Findings discussed with Dr. Garrett at 1515 hours on 3/7/2025.        LOCATION:  [facility]        Dictated by (CST): Christopher Green MD on 3/07/2025 at 3:13 PM      Finalized by (CST): Christopher Green MD on 3/07/2025 at 3:15 PM  I viewed the chest x-ray and CT perfusion of the brain films myself and there is no cardiopulmonary abnormality and no evidence of ischemic stroke.    MRI remains pending at the time of dictation.    Case was discussed with and the patient was seen by [facility] hospitalist Dr. García.  General neurologist Dr. Anna was contacted via [employer] regarding hospitalization.    Reglan, Benadryl and Toradol were administered for headache and symptoms were completely resolved both headache and neurologic deficits after period of observation.  MDM      #1.  Left-sided weakness.  Concern initially was for an acute ischemic stroke.  CT imaging did not bear that out.  Because of associated headache, migraine cocktail was administered and weakness was resolved with the headache with medication.  MRI imaging is pending.  [facility] and repeat troponin.  2.  Acute headache.  See #1.  3.  Type 2 diabetes mellitus.    Admission disposition: 3/7/2025  4:04 PM           Medical Decision Making      Disposition and Plan     Clinical Impression:  1. Left-sided weakness    2. Acute nonintractable headache, unspecified headache type    3. Type 2 diabetes mellitus with hyperglycemia, without long-term current use of insulin ([facility])         Disposition:  Admit  3/7/2025  4:04 pm    Follow-up:  No follow-up provider specified.        Medications Prescribed:  Current Discharge Medication List              Supplementary Documentation:         Hospital Problems       Present on Admission  Date Reviewed: 2/6/2025             ICD-10-CM Noted POA    * (Principal) Left-sided weakness R53.1 3/7/2025 Unknown    Acquired hypothyroidism E03.9 3/7/2025 Unknown    Acute nonintractable headache R51.9 3/7/2025 Unknown    Diabetes mellitus (HCC) E11.9 Unknown Yes    Gastroesophageal reflux disease K21.9 3/7/2025 Unknown    History of thyroid cancer Z85.850 3/7/2025 Unknown    Hyperlipidemia E78.5 3/7/2025 Unknown    Paresthesia R20.2 3/7/2025 Unknown    Primary hypertension I10 10/27/2023 Yes         TNK/ NI Documentation:    Date/Time last known well:   3/7/2025 1:45 PM    NIHSS on presentation: 2     Chief Complaint   Patient presents with    Swelling    Numbness     IV Tenecteplase (TNK) administered: No; Patient is not a Candidate for IV TNK due to: Symptoms inconsistent with an acute ischemic stroke and CT imaging including CT perfusion negative.    Candidate for Endovascular thrombectomy (EVT): No; Patient is not a candidate for Endovascular Thrombectomy due to: No large vessel occlusion ( LVO)  on CTA/MRA imaging      Disposition: There is no disposition on file for this visit.

## 2025-03-07 NOTE — H&P
[facility]                                                               History & Physical         Paula Hugo Patient Status:  Emergency    1980 MRN [id number]   [facility] EMERGENCY DEPARTMENT Attending Apolonia Garrett MD   Hosp Day # 0 PCP HARSH MALHOTRA     Chief Complaint: Initially reported left facial tingling for 30 minutes prior to arrival to the emergency room, headache    History of Present Illness:  Paula Hugo is a 44 year old female admitted with paresthesias with left facial tingling for 30 minutes prior to arrival to the emergency room along with headache.  Headache started 3 days back according to patient.  Patient seen with patient's  at bedside.  Patient has 11-year-old autistic son according to patient and patient's  at bedside.  No other stressors and no new stressfull events in life according to patient and patient's family at bedside.  Denies any cold or cough symptoms.  Denies any runny nose.  Complains of some sore throat previously according to patient.  While in the emergency room patient was found to have sinus tachycardia.  While in the emergency room patient complained of decreased sensation of the left cheek then the left leg and left leg break and then now states she has paresthesias numbness all over the face and has generalized weakness all over the body.  Complains of persisting headache.  Seen by stroke navigator while in the emergency room and neurologist consulted from emergency room and had workup including CT brain and CTA head and neck which came back negative.  Also had CT brain perfusion study per neurologist while still in ER which came back with no evidence of ischemia or infarct by perfusion weighted imaging of the brain.  Patient admitted for further workup and neurology including MRI of the brain to complete workup and will be seen by general neurologist on the floor, will need to rule out complicated migraine  versus anxiety versus viral syndrome or other infectious etiology or if all workup negative may need to rule out anxiety or conversion disorder also    History:  Past Medical History:    Diabetes (HCC)    Disorder of thyroid    High blood pressure    High cholesterol    History of COVID-19    Had flu like S/S x 2-3 days.    Hypothyroidism    Neuropathy    feet    PONV (postoperative nausea and vomiting)    Thyroid cancer (HCC)    Visual impairment    glasses       Past Surgical History:   Procedure Laterality Date      2002      06/15/2005      2007      10/17/2013    Thyroidectomy         Family history:  Family History   Problem Relation Age of Onset    Diabetes Mother       Reviewed    Social history:   reports that she has never smoked. She has never used smokeless tobacco. She reports that she does not drink alcohol and does not use drugs.    Allergies:  Allergies[1]    Home Medications:  Prescriptions Prior to Admission[2]    Review of Systems:  A comprehensive 14 point review of systems was completed.  Pertinent positives and negatives noted in the the HPI.    Physical Exam:     Vital signs: Blood pressure 108/82, pulse 98, temperature 98 °F (36.7 °C), temperature source Temporal, resp. rate 16, weight 161 lb 13.4 oz (73.4 kg), last menstrual period 2025, SpO2 95%, not currently breastfeeding.  General: No acute distress.   HEENT: Moist mucous membranes.   Respiratory: Clear to auscultation bilaterally.  No wheezes. No rhonchi.  Cardiovascular: S1, S2.  Regular rate and rhythm.    Abdomen: Soft, nontender, nondistended.  Positive bowel sounds.   Neurologic: Awake alert, no facial droop seen at this time, moving all extremities spontaneously, no focal neurological deficits.  Musculoskeletal: Full range of motion of all extremities.  No pedal edema or calf tenderness  Psychiatric: Appropriate mood and affect.      Diagnostic Data:      Laboratory Data:   Lab  Results   Component Value Date    WBC 5.7 03/07/2025    HGB 15.3 03/07/2025    HCT 44.5 03/07/2025    .0 03/07/2025    CREATSERUM 0.56 03/07/2025    BUN 10 03/07/2025     03/07/2025    K 4.0 03/07/2025     03/07/2025    CO2 27.0 03/07/2025     03/07/2025    CA 9.7 03/07/2025    ALB 5.1 03/07/2025    ALKPHO 88 03/07/2025    BILT 0.5 03/07/2025    TP 7.5 03/07/2025    AST 39 03/07/2025    ALT 49 03/07/2025    PTT 29.8 03/07/2025    INR 0.93 03/07/2025    PTP 12.6 03/07/2025    PGLU 177 03/07/2025       Recent Labs   Lab 03/07/25  1420   PTP 12.6   INR 0.93     Recent Labs   Lab 03/07/25  1420   TROPHS <3       Imaging:  Imaging data reviewed in Epic.  CT brain stroke protocol done in ER on 3/7/2025 with no evidence of acute intracranial process  CTA head and neck done in ER on 3/7/2025 with no evidence of acute intracranial process.  Unremarkable CTA of the [place] [facility] [place].  Unremarkable CT of the neck.  CT brain perfusion study done in ER on 3/7/2025 with no evidence of ischemia or infarct in the diffusion-weighted imaging of the brain    ASSESSMENT / PLAN:     #Headache with paresthesias and numbness and transient slurred speech according to patient.  Neurology consulted from ER  Workup done in ER included  CT brain stroke protocol done in ER on 3/7/2025 with no evidence of acute intracranial process  CTA head and neck done in ER on 3/7/2025 with no evidence of acute intracranial process.  Unremarkable CTA of the [place] [facility] [place].  Unremarkable CT of the neck.  CT brain perfusion study done in ER on 3/7/2025 with no evidence of ischemia or infarct in the diffusion-weighted imaging of the brain  MRI of the brain pending  Patient admitted for further workup and neurology including MRI of the brain to complete workup and will be seen by general neurologist on the floor per neurologist, will need to rule out complicated migraine versus anxiety versus viral syndrome or other infectious  etiology.  If all workup negative may need to rule out anxiety or conversion disorder also  Will keep on stroke protocol at this time until MRI results back.  Will check 2D echo with bubbles  Check vitamin B12 level with paresthesias  #Sinus tachycardia, nonspecific ST-T wave changes  #Recent sore throat  Will check throat culture with patient's history of sore throat.  White count normal.  Patient afebrile.  Will monitor on telemetry  Will check troponin trend, with patient's nonspecific ST-T wave abnormality with ST depression lateral leads and nonspecific T wave abnormality in inferior and anterolateral leads.  Will also consult cardiology as patient with no history of prior heart disease and has underlying risk factors for heart disease including diabetes, hypertension and hyperlipidemia.  2D echo with bubble study   Check ESR/sed rate and CRP and antistreptolysin antibody   Follow TSH with reflex T4 with patient sinus tachycardia  #History of thyroid cancer with prior total thyroidectomy according to patient and patient's  at bedside  #Acquired hypothyroidism  Check TSH with reflex T4  Continue home levothyroxine  #Diabetes mellitus type 2  Hyperglycemia protocol  Hold home oral hypoglycemics while in hospital  Last hemoglobin A1c checked in epic was 12/5/2024 was 6.3.  Follow Accu-Cheks  NovoLog correction factor coverage as needed, if blood sugar starts trending higher will add carb counting and may need Tresiba.  Check hemoglobin A1c  #Hyperlipidemia  Continue home medication  #Hypertension  Follow blood pressure  Will plan to continue home medication  #GERD  Continue home PPI    Quality:  DVT Prophylaxis: SCD, subcutaneous Lovenox  CODE status:   Code Status: Not on file  Couch: No urinary catheter in place      Plan of care discussed with patient, patient's  at bedside      Discussed with ER Physician.  My colleague will follow from morning tomorrow      Sujey García MD  3/7/2025  4:18  PM         [1]   Allergies  Allergen Reactions    Penicillins HIVES   [2] (Not in a hospital admission)

## 2025-03-07 NOTE — PLAN OF CARE
Stroke Alert initiated ED  Last Know Normal at 1350, 3/7/2025  Pre-morbid MRS 0  Initial NIHSS 2 including Left leg drift, decreased sensation left cheek, left leg.       Patient accompanied to CT dept  Repeat NIHSS completed back in ED room  NIH Stroke Scale  1a.  Level of consciousness: 0   1b. LOC questions:  0   1c. LOC commands: 0   2.  Best Gaze: 0   3. Visual: 0   4. Facial Palsy: 1   5a. Motor left arm: 0   5b.  Motor right arm: 0   6a. Motor left le   6b.  Motor right le   7. Limb Ataxia: 0   8.  Sensory: 1   9. Best Language:  2   10. Dysarthria: 1   11. Extinction and Inattention: 0     Total:   5      Other symptoms include fluctuation in symptoms through out body.      Per Dr Fatima, patient is not a candidate for TNK, recommends gen neuro consult and probably psych.    Case discussed with Dr Garrett, ED      Of note: Patient with fluctuation symptoms from right side to left side. During post CT scan assessment, patient with right facial droop that lasted approx 5-8 seconds, slurred speech, garbled speech, aphasic. From Left upper extremity drift, to right upper arm drift, to bilateral upper extremity drift. Left foot with no dorsi/plantar flexion, to no bilateral dorsi/plantar flexion. Phone call made to Dr. Fatima, with continuous changes, and CTP recommended. CTP negative. Discussed findings and recommendations with CAROL chan.       Please refer to the Stroke Data Flowsheets for additional information and Stroke Alert response times.

## 2025-03-07 NOTE — ED QUICK NOTES
MD called to bedside after pt HR increased to 150 and pt developed changes.  Pt taken back to CT with this RN, Cardiac monitor and stroke navigator after witnessing acute changes on opposite side of originally affected.

## 2025-03-08 ENCOUNTER — APPOINTMENT (OUTPATIENT)
Dept: CV DIAGNOSTICS | Facility: HOSPITAL | Age: 45
End: 2025-03-08
Attending: INTERNAL MEDICINE
Payer: COMMERCIAL

## 2025-03-08 VITALS
DIASTOLIC BLOOD PRESSURE: 98 MMHG | TEMPERATURE: 98 F | WEIGHT: 163.56 LBS | HEART RATE: 102 BPM | SYSTOLIC BLOOD PRESSURE: 125 MMHG | OXYGEN SATURATION: 94 % | RESPIRATION RATE: 16 BRPM | BODY MASS INDEX: 32 KG/M2

## 2025-03-08 PROBLEM — F41.9 ANXIETY DISORDER: Status: ACTIVE | Noted: 2025-03-08

## 2025-03-08 LAB
ADENOVIRUS PCR:: NOT DETECTED
ALBUMIN SERPL-MCNC: 4.1 G/DL (ref 3.2–4.8)
ALBUMIN/GLOB SERPL: 2.1 {RATIO} (ref 1–2)
ALP LIVER SERPL-CCNC: 59 U/L
ALT SERPL-CCNC: 35 U/L
ANION GAP SERPL CALC-SCNC: 10 MMOL/L (ref 0–18)
ASO AB SERPL-ACNC: 104.9 IU/ML (ref ?–194)
AST SERPL-CCNC: 25 U/L (ref ?–34)
B PARAPERT DNA SPEC QL NAA+PROBE: NOT DETECTED
B PERT DNA SPEC QL NAA+PROBE: NOT DETECTED
BASOPHILS # BLD AUTO: 0.03 X10(3) UL (ref 0–0.2)
BASOPHILS NFR BLD AUTO: 0.6 %
BILIRUB SERPL-MCNC: 0.6 MG/DL (ref 0.3–1.2)
BUN BLD-MCNC: 8 MG/DL (ref 9–23)
C PNEUM DNA SPEC QL NAA+PROBE: NOT DETECTED
CALCIUM BLD-MCNC: 8.7 MG/DL (ref 8.7–10.6)
CHLORIDE SERPL-SCNC: 109 MMOL/L (ref 98–112)
CHOLEST SERPL-MCNC: 222 MG/DL (ref ?–200)
CO2 SERPL-SCNC: 25 MMOL/L (ref 21–32)
CORONAVIRUS 229E PCR:: NOT DETECTED
CORONAVIRUS HKU1 PCR:: NOT DETECTED
CORONAVIRUS NL63 PCR:: NOT DETECTED
CORONAVIRUS OC43 PCR:: NOT DETECTED
CREAT BLD-MCNC: 0.44 MG/DL
EGFRCR SERPLBLD CKD-EPI 2021: 122 ML/MIN/1.73M2 (ref 60–?)
EOSINOPHIL # BLD AUTO: 0.11 X10(3) UL (ref 0–0.7)
EOSINOPHIL NFR BLD AUTO: 2.3 %
ERYTHROCYTE [DISTWIDTH] IN BLOOD BY AUTOMATED COUNT: 13.2 %
EST. AVERAGE GLUCOSE BLD GHB EST-MCNC: 126 MG/DL (ref 68–126)
FLUAV RNA SPEC QL NAA+PROBE: NOT DETECTED
FLUBV RNA SPEC QL NAA+PROBE: NOT DETECTED
GLOBULIN PLAS-MCNC: 2 G/DL (ref 2–3.5)
GLUCOSE BLD-MCNC: 123 MG/DL (ref 70–99)
GLUCOSE BLD-MCNC: 125 MG/DL (ref 70–99)
GLUCOSE BLD-MCNC: 127 MG/DL (ref 70–99)
GLUCOSE BLD-MCNC: 191 MG/DL (ref 70–99)
HBA1C MFR BLD: 6 % (ref ?–5.7)
HCT VFR BLD AUTO: 38.2 %
HDLC SERPL-MCNC: 36 MG/DL (ref 40–59)
HGB BLD-MCNC: 13.1 G/DL
IMM GRANULOCYTES # BLD AUTO: 0.02 X10(3) UL (ref 0–1)
IMM GRANULOCYTES NFR BLD: 0.4 %
LDLC SERPL CALC-MCNC: 156 MG/DL (ref ?–100)
LYMPHOCYTES # BLD AUTO: 1.38 X10(3) UL (ref 1–4)
LYMPHOCYTES NFR BLD AUTO: 28.9 %
MAGNESIUM SERPL-MCNC: 1.8 MG/DL (ref 1.6–2.6)
MCH RBC QN AUTO: 28.6 PG (ref 26–34)
MCHC RBC AUTO-ENTMCNC: 34.3 G/DL (ref 31–37)
MCV RBC AUTO: 83.4 FL
METAPNEUMOVIRUS PCR:: NOT DETECTED
MONOCYTES # BLD AUTO: 0.32 X10(3) UL (ref 0.1–1)
MONOCYTES NFR BLD AUTO: 6.7 %
MYCOPLASMA PNEUMONIA PCR:: NOT DETECTED
NEUTROPHILS # BLD AUTO: 2.91 X10 (3) UL (ref 1.5–7.7)
NEUTROPHILS # BLD AUTO: 2.91 X10(3) UL (ref 1.5–7.7)
NEUTROPHILS NFR BLD AUTO: 61.1 %
NONHDLC SERPL-MCNC: 186 MG/DL (ref ?–130)
OSMOLALITY SERPL CALC.SUM OF ELEC: 298 MOSM/KG (ref 275–295)
PARAINFLUENZA 1 PCR:: NOT DETECTED
PARAINFLUENZA 2 PCR:: NOT DETECTED
PARAINFLUENZA 3 PCR:: NOT DETECTED
PARAINFLUENZA 4 PCR:: NOT DETECTED
PHOSPHATE SERPL-MCNC: 3.9 MG/DL (ref 2.4–5.1)
PLATELET # BLD AUTO: 217 10(3)UL (ref 150–450)
POTASSIUM SERPL-SCNC: 3.4 MMOL/L (ref 3.5–5.1)
PROT SERPL-MCNC: 6.1 G/DL (ref 5.7–8.2)
RBC # BLD AUTO: 4.58 X10(6)UL
RHINOVIRUS/ENTERO PCR:: NOT DETECTED
RSV RNA SPEC QL NAA+PROBE: NOT DETECTED
SARS-COV-2 RNA NPH QL NAA+NON-PROBE: NOT DETECTED
SODIUM SERPL-SCNC: 144 MMOL/L (ref 136–145)
TRIGL SERPL-MCNC: 164 MG/DL (ref 30–149)
TROPONIN I SERPL HS-MCNC: <3 NG/L
TROPONIN I SERPL HS-MCNC: <3 NG/L
VLDLC SERPL CALC-MCNC: 32 MG/DL (ref 0–30)
WBC # BLD AUTO: 4.8 X10(3) UL (ref 4–11)

## 2025-03-08 PROCEDURE — 93306 TTE W/DOPPLER COMPLETE: CPT | Performed by: INTERNAL MEDICINE

## 2025-03-08 PROCEDURE — 90792 PSYCH DIAG EVAL W/MED SRVCS: CPT

## 2025-03-08 PROCEDURE — 99239 HOSP IP/OBS DSCHRG MGMT >30: CPT | Performed by: HOSPITALIST

## 2025-03-08 PROCEDURE — 99223 1ST HOSP IP/OBS HIGH 75: CPT | Performed by: OTHER

## 2025-03-08 RX ORDER — MAGNESIUM OXIDE 400 MG/1
400 TABLET ORAL ONCE
Status: COMPLETED | OUTPATIENT
Start: 2025-03-08 | End: 2025-03-08

## 2025-03-08 RX ORDER — POTASSIUM CHLORIDE 1500 MG/1
40 TABLET, EXTENDED RELEASE ORAL EVERY 4 HOURS
Status: COMPLETED | OUTPATIENT
Start: 2025-03-08 | End: 2025-03-08

## 2025-03-08 RX ORDER — ATORVASTATIN CALCIUM 40 MG/1
40 TABLET, FILM COATED ORAL NIGHTLY
Qty: 30 TABLET | Refills: 0 | Status: SHIPPED | OUTPATIENT
Start: 2025-03-08

## 2025-03-08 RX ORDER — ASPIRIN 81 MG/1
81 TABLET ORAL DAILY
Qty: 30 TABLET | Refills: 0 | Status: SHIPPED | OUTPATIENT
Start: 2025-03-08

## 2025-03-08 NOTE — CONSULTS
[facility]   NEUROLOGY   CONSULT NOTE    Admission date: 3/7/2025  Reason for Consult: Swelling, numbness, lump in the neck.  Chief Complaint:   Chief Complaint   Patient presents with    Swelling    Numbness   ________________________________________________________________    History     History of Presenting Illness  44 year old female with history of multiple medical problems including diabetes, hypothyroidism, hypertension, hyperlipidemia, neuropathy presented to the hospital with symptoms of lump in the neck, facial tingling and numbness on the left side.  Patient also complaining of headache for the past 2 to 3 days.  Patient was evaluated in the emergency department and was not deemed a candidate for TNK/neurointervention.  Currently patient symptoms of all resolved.  Patient back to baseline.  Denies any diplopia, dysarthria, dysphagia, weakness, numbness, paresthesias.    History obtained from patient, chart review, stroke navigator.    Past Medical History:    Diabetes (HCC)    Disorder of thyroid    High blood pressure    High cholesterol    History of COVID-19    Had flu like S/S x 2-3 days.    Hypothyroidism    Neuropathy    feet    PONV (postoperative nausea and vomiting)    Thyroid cancer (HCC)    Visual impairment    glasses     Past Surgical History:   Procedure Laterality Date      2002      06/15/2005      2007      10/17/2013    Thyroidectomy       Social History     Socioeconomic History    Marital status:    Tobacco Use    Smoking status: Never    Smokeless tobacco: Never   Vaping Use    Vaping status: Never Used   Substance and Sexual Activity    Alcohol use: Never    Drug use: Never   Other Topics Concern    Caffeine Concern Yes     Comment: 1-2 cups a day    Special Diet Yes     Comment: 2-3 x week     Social Drivers of Health     Food Insecurity: No Food Insecurity (3/7/2025)    NCSS - Food Insecurity     Worried  About Running Out of Food in the Last Year: No     Ran Out of Food in the Last Year: No   Transportation Needs: No Transportation Needs (3/7/2025)    NCSS - Transportation     Lack of Transportation: No   Housing Stability: Not At Risk (3/7/2025)    NCSS - Housing/Utilities     Has Housing: Yes     Worried About Losing Housing: No     Unable to Get Utilities: No     Family History   Problem Relation Age of Onset    Diabetes Mother      Allergies Allergies[1]    Home Meds  Current Outpatient Medications   Medication Instructions    Accu-Chek Softclix Lancets Does not apply Misc Use to check blood glucose twice daily. Dx E11.65.    Cevimeline HCl (EVOXAC) 30 mg, Oral, 3 times daily    Continuous Glucose Sensor (DEXCOM G7 SENSOR) Does not apply Misc 1 each, Does not apply, Every 10 days, Use as directed every 10 days    ergocalciferol (VITAMIN D2) 50,000 Units, Twice weekly    Farxiga 10 mg, Oral, Daily, Can hold until diet improves    Glucose Blood (ACCU-CHEK GUIDE TEST) In Vitro Strip Use to check blood glucose twice daily. Dx E11.65.    metFORMIN ER (GLUCOPHAGE XR) 500 mg, Oral, 2 times daily with meals, Can hold until diet improves    Synthroid 125 mcg, Oral, Before breakfast, Synthroid brand name only     Scheduled Meds:   levothyroxine  125 mcg Oral Before breakfast    potassium chloride  40 mEq Oral Q4H    aspirin  300 mg Rectal Daily    Or    aspirin  325 mg Oral Daily    enoxaparin  40 mg Subcutaneous Daily    atorvastatin  40 mg Oral Nightly    insulin aspart  1-10 Units Subcutaneous TID AC and HS     Continuous Infusions:   sodium chloride 75 mL/hr at 03/08/25 1136     PRN Meds:  glucose **OR** glucose **OR** glucose-vitamin C **OR** dextrose **OR** glucose **OR** glucose **OR** glucose-vitamin C    acetaminophen **OR** acetaminophen    labetalol    hydrALAzine    ondansetron    acetaminophen    melatonin    nitroglycerin    OBJECTIVE   VITAL SIGNS:   Temp:  [97.7 °F (36.5 °C)-98.2 °F (36.8 °C)] 97.7 °F  (36.5 °C)  Pulse:  [] 103  Resp:  [16-19] 16  BP: ()/(67-89) 99/72  SpO2:  [94 %-99 %] 94 %    PHYSICAL EXAM:    NEUROLOGIC:    Mental Status:  A&O x 4, Follows simple commands, no obvious aphasia or dysarthria  Cranial nerves: PERRL.  Visual fields full.  EOMI.  Face symmetric with normal movement bilaterally.  Hearing grossly intact. Tongue midline with normal movements.   Motor: Drift:  Absent; Motor exam is 5 out of 5 in all extremities bilaterally  Sensation: Intact to light touch bilaterally  Cerebellar: Normal Finger-To-Nose test      LABORATORY DATA:  Last 24 hour labs were reviewed in detail.  Recent Labs   Lab 03/07/25  1420 03/08/25  0247    144   K 4.0 3.4*    109   CO2 27.0 25.0   * 127*   BUN 10 8*   CREATSERUM 0.56 0.44*     Recent Labs   Lab 03/07/25  1420 03/08/25  0247   WBC 5.7 4.8   HGB 15.3 13.1   .0 217.0     Recent Labs   Lab 03/07/25  1420 03/08/25  0247   ALT 49 35   AST 39* 25     Recent Labs   Lab 03/08/25  0247   MG 1.8   PHOS 3.9     Last A1c value was 6% done 3/7/2025.     Lab Results   Component Value Date     03/08/2025    HDL 36 03/08/2025    TRIG 164 03/08/2025    VLDL 32 03/08/2025        Radiology:    MRI BRAIN (CPT=70551)    Result Date: 3/7/2025  CONCLUSION: 1. No acute intracranial findings.  2. Scattered foci of bright T2/FLAIR signal which are indeterminate but could be secondary to migraine headaches, early microvascular disease, post infectious/inflammatory etiology or non-specific gliosis.    LOCATION:  [id number]   Dictated by (CST): Olamide Benites MD on 3/07/2025 at 8:47 PM     Finalized by (CST): Olamide Benites MD on 3/07/2025 at 8:51 PM       XR CHEST AP PORTABLE  (CPT=71045)    Result Date: 3/7/2025  CONCLUSION:  No acute radiographic findings   LOCATION:  [id number]      Dictated by (CST): Dick Dasilva MD on 3/07/2025 at 4:54 PM     Finalized by (CST): Dick Dasilva MD on 3/07/2025 at 4:55 PM       CT STROKE(JEFF BRAIN)  PERFUSION ONLY(CPT=0042T)    Result Date: 3/7/2025  CONCLUSION:  There is no evidence of ischemia or infarct by perfusion weighted imaging of the brain.  Findings discussed with Dr. Garrett at 1515 hours on 3/7/2025.   LOCATION:  [facility]   Dictated by (CST): Christopher Green MD on 3/07/2025 at 3:13 PM     Finalized by (CST): Christopher Green MD on 3/07/2025 at 3:15 PM       CT STROKE CTA BRAIN/CTA NECK (W IV)(CPT=70496/22868)    Addendum Date: 3/7/2025    ADDENDUM:  Correction of the above time, The case was discussed at 1444 hours on 3/7/2025 with read back.  Dictated by (CST): Christopher Green MD on 3/07/2025 at 2:49 PM     Finalized by (CST): Christopher Green MD on 3/07/2025 at 2:51 PM             Result Date: 3/7/2025  CONCLUSION:   1. No evidence of acute intracranial process.  2. Unremarkable CTA of the [id number].  3. Unremarkable CTA of the neck.  Findings were discussed with Dr. Garrett at 1844 hours on 3/7/2025 with read back.   LOCATION:  [facility]   Dictated by (CST): Christopher Green MD on 3/07/2025 at 2:40 PM     Finalized by (CST): Christopher Green MD on 3/07/2025 at 2:45 PM       CT STROKE BRAIN (NO IV)(CPT=70450)    Result Date: 3/7/2025  CONCLUSION:  No evidence of acute intracranial process.  Critical results were called to Dr. Garrett at 1439 hours on 3/7/2025.  There was appropriate read back.    LOCATION:  [facility]   Dictated by (CST): Christopher Green MD on 3/07/2025 at 2:36 PM     Finalized by (CST): Christopher Green MD on 3/07/2025 at 2:39 PM      ASSESSMENT/PLAN   44 year old female with:    Left-sided focal neurological deficits with swelling of throat as well as headache for 2 to 3 days.  Symptoms resolved findings could be due to TIA but seems less likely.  MRI of the brain did not show any acute intracranial abnormality.  CTA of the head and neck including CT PE was unremarkable.  LDL was 156.  Patient started on aspirin and statin.  Advised to continue medications.  Other possibilities including complex  migraine as well as panic disorder was discussed.  Patient to follow-up with her primary care and neurologist after discharge.  All questions answered.    Principal Problem:    Left-sided weakness  Active Problems:    Diabetes mellitus (HCC)    Primary hypertension    Acute nonintractable headache    Hyperlipidemia    Gastroesophageal reflux disease    Acquired hypothyroidism    History of thyroid cancer    Paresthesia    Acute nonintractable headache, unspecified headache type    Type 2 diabetes mellitus with hyperglycemia, without long-term current use of insulin (HCC)    Anxiety disorder       Zach Vaughn MD  Neurohospitalist  [facility]    Disclaimer: This record was dictated using Dragon software. There may be errors due to voice recognition problems that were not realized and corrected during the completion of the note.           [1]   Allergies  Allergen Reactions    Penicillins HIVES

## 2025-03-08 NOTE — SPIRITUAL CARE NOTE
Spiritual Care Visit Note    Patient Name: Paula Hugo Date of Spiritual Care Visit: 25   : 1980 Primary Dx: Left-sided weakness   Referred By: [facility]     Spiritual Care Taxonomy:    Intended Effects: Demonstrate caring and concern    Methods: Offer support    Interventions: Silent prayer    Visit Type/Summary:    Paula was at MRI when  arrived at her room. She will remain on the  visit schedule.  Spiritual Care support can be requested via an Epic consult. For urgent/immediate needs, please contact the On Call  at: [facility]: ext [phone]    Olivia aB MDiv.  Staff

## 2025-03-08 NOTE — PHYSICAL THERAPY NOTE
PHYSICAL THERAPY EVALUATION - INPATIENT     Room Number: 3622/3622-A  Evaluation Date: 3/8/2025  Type of Evaluation: Initial  Physician Order: PT Eval and Treat    Presenting Problem: L sided weakness  Co-Morbidities : DMII, Hx of thyroid CA, GERD, HTN, HDl  Reason for Therapy: Mobility Dysfunction and Discharge Planning    PHYSICAL THERAPY ASSESSMENT   Patient is a 44 year old female admitted 3/7/2025 for L sided weakness.  Prior to admission, patient's baseline is IND.  Patient is currently functioning near baseline with bed mobility, transfers, and gait.  At this time pt is near baseline and does not have IP PT goals.    Patient will benefit from continued skilled PT Services at discharge to promote prior level of function.  Anticipate patient will return home with OP PT.    PLAN DURING HOSPITALIZATION  Nursing Mobility Recommendation : 1 Assist  PT Device Recommendation: Rolling walker              CURRENT GOALS    Patient has met all skilled IPPT goals at this time. Patient will be discharged from Physical Therapy services.  Please re-order if a new functional limitation presents during this admission.        PHYSICAL THERAPY MEDICAL/SOCIAL HISTORY  History related to current admission: Patient is a 44 year old female admitted on 3/7/2025 from Home for L sided weakness.      HOME SITUATION  Type of Home: House  Home Layout: Multi-level                     Lives With: Spouse, Son (2 sons)    Drives: Yes   Patient Regularly Uses: None      Prior Level of [place]: PT is IND with all Adls and mobility. 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     SUBJECTIVE  \"I feel better, then yesterday\"    OBJECTIVE  Precautions: None  Fall Risk: Standard fall risk    WEIGHT BEARING RESTRICTION     PAIN ASSESSMENT  Ratin  Location: Pt reports no pain       COGNITION  Overall Cognitive Status:  WFL - within functional limits    RANGE OF MOTION AND STRENGTH  ASSESSMENT  Upper extremity ROM and strength are within functional limits     Lower extremity ROM is within functional limits     Lower extremity strength is within functional limits     BALANCE  Static Sitting: Good  Dynamic Sitting: Good  Static Standing: Good  Dynamic Standing: Fair +    ADDITIONAL TESTS  Additional Tests: Modified [place]              Modified [place]: 0                  ACTIVITY TOLERANCE                         O2 WALK       NEUROLOGICAL FINDINGS                        AM-PAC '6-Clicks' INPATIENT SHORT FORM - BASIC MOBILITY  How much difficulty does the patient currently have...  Patient Difficulty: Turning over in bed (including adjusting bedclothes, sheets and blankets)?: A Little   Patient Difficulty: Sitting down on and standing up from a chair with arms (e.g., wheelchair, bedside commode, etc.): A Little   Patient Difficulty: Moving from lying on back to sitting on the side of the bed?: A Little   How much help from another person does the patient currently need...   Help from Another: Moving to and from a bed to a chair (including a wheelchair)?: A Little   Help from Another: Need to walk in hospital room?: A Little   Help from Another: Climbing 3-5 steps with a railing?: A Little     AM-PAC Score:  Raw Score: 18   Approx Degree of Impairment: 46.58%   Standardized Score (AM-PAC Scale): 43.63   CMS Modifier (G-Code): CK    FUNCTIONAL ABILITY STATUS  Gait Assessment   Functional Mobility/Gait Assessment  Gait Assistance: Modified independent  Distance (ft): 200  Assistive Device: None  Pattern: Within Functional Limits    Skilled Therapy Provided     Bed Mobility:  Rolling: NT  Supine to sit: IND   Sit to supine: IND     Transfer Mobility:  Sit to stand: IND   Stand to sit: IND  Gait = Mod I    Therapist's Comments: pt reports that she continues to feel weakness on the L side however pt did not demonstrate any strength differences. Pt did not have any LOB when ambulating.      Exercise/Education Provided:  Bed mobility  Body mechanics  Gait training  Transfer training    Patient End of Session: In bed, Needs met, Call light within reach, RN aware of session/findings, All patient questions and concerns addressed      Patient Evaluation Complexity Level:  History Moderate - 1 or 2 personal factors and/or co-morbidities   Examination of body systems Low -  addressing 1-2 elements   Clinical Presentation Low- Stable   Clinical Decision Making Low Complexity       PT Session Time: 23 minutes  Therapeutic Activity: 15 minutes

## 2025-03-08 NOTE — PLAN OF CARE
Assume care @ 0730  AO X4, Verbally responsive, RA. Neuro Q4hr. NO deficit noted. Pt c/o of left neck slight pain and numbness. Pt was able to get up to wash up this. Morning care done independently with standby assist. Pt stated she still has a bit of weakness to left side but not as bad as it was overnight.   NSR on Tele  Continent. Up ad mavis  IVFs 0.9 @ 75ml/hr  Seen by speech; on regular diet.  Lovenox sub Q  Aspiration precaution  Good appetite meals. QID accu check. Insulin as ordered.  Fall and safety precautions in place.      Problem: Diabetes/Glucose Control  Goal: Glucose maintained within prescribed range  Description: INTERVENTIONS:  - Monitor Blood Glucose as ordered  - Assess for signs and symptoms of hyperglycemia and hypoglycemia  - Administer ordered medications to maintain glucose within target range  - Assess barriers to adequate nutritional intake and initiate nutrition consult as needed  - Instruct patient on self management of diabetes  Outcome: Progressing     Problem: Patient/Family Goals  Goal: Patient/Family Long Term Goal  Description: Patient's Long Term Goal:     Interventions:  -   - See additional Care Plan goals for specific interventions  Outcome: Progressing  Goal: Patient/Family Short Term Goal  Description: Patient's Short Term Goal:     Interventions:   -   - See additional Care Plan goals for specific interventions  Outcome: Progressing     Problem: Diabetes/Glucose Control  Goal: Glucose maintained within prescribed range  Description: INTERVENTIONS:  - Monitor Blood Glucose as ordered  - Assess for signs and symptoms of hyperglycemia and hypoglycemia  - Administer ordered medications to maintain glucose within target range  - Assess barriers to adequate nutritional intake and initiate nutrition consult as needed  - Instruct patient on self management of diabetes  Outcome: Progressing     Problem: Patient/Family Goals  Goal: Patient/Family Long Term Goal  Description:  Patient's Long Term Goal:    Interventions:  -   - See additional Care Plan goals for specific interventions  Outcome: Progressing  Goal: Patient/Family Short Term Goal  Description: Patient's Short Term Goal:     Interventions:   -  - See additional Care Plan goals for specific interventions  Outcome: Progressing

## 2025-03-08 NOTE — SLP NOTE
ADULT SWALLOWING EVALUATION    ASSESSMENT    ASSESSMENT/OVERALL IMPRESSION:  Pt is a 44 year old female who was admitted with parestehesias with left facial tingling for 30 minutes prior to her arrival.  She also reports that she was having some word finding issues yesterday however today everything is resolved.      Pt seen with PO trials of puree, thin and a cracker.  Pt with good oral retrieval and containment.  Pt drinking several sips of water via straw with timely swallow response.  Mastication of cracker was WNL with complete clearance of the oral cavity.  Pt with good hyolaryngeal elevation and no s/s of aspiration noted.  Pt did report that she has had a total thyroidectomy and occasionally feels like things get stuck however no history of recurrent pneumonias or respiratory illness.      Education provided to pt as she has a little anxiety of why she had the episode of word finding issues yesterday.  Discussed with her and advised her to ask any other questions to her MD.  MRI was negative for stroke (see below).  Recommend pt to have regular thin diet and no further SLP services due to no deficits noted with swallowing and or cognition.  Discussed with pt and RN who verbalized agreement and understanding.  Please re-order SLP services if s/s of aspiration begin to occur.           RECOMMENDATIONS   Diet Recommendations - Solids: Regular  Diet Recommendations - Liquids: Thin Liquids                           Aspiration Precautions: Upright position, Slow rate, Small bites  Medication Administration Recommendations: No restrictions       HISTORY   MEDICAL HISTORY  Reason for Referral: Stroke protocol    Problem List  Principal Problem:    Left-sided weakness  Active Problems:    Diabetes mellitus (HCC)    Primary hypertension    Acute nonintractable headache    Hyperlipidemia    Gastroesophageal reflux disease    Acquired hypothyroidism    History of thyroid cancer    Paresthesia    Acute nonintractable  headache, unspecified headache type    Type 2 diabetes mellitus with hyperglycemia, without long-term current use of insulin (HCC)      Past Medical History  Past Medical History:    Diabetes (HCC)    Disorder of thyroid    High blood pressure    High cholesterol    History of COVID-19    Had flu like S/S x 2-3 days.    Hypothyroidism    Neuropathy    feet    PONV (postoperative nausea and vomiting)    Thyroid cancer (HCC)    Visual impairment    glasses       Prior Living Situation: Home with spouse  Diet Prior to Admission: Regular, Thin liquids  Precautions: Aspiration    Patient/Family Goals: none stated    SWALLOWING HISTORY  Current Diet Consistency: Regular, Thin liquids  Dysphagia History: no known history  Imaging Results: CONCLUSION:   1. No acute intracranial findings.    2. Scattered foci of bright T2/FLAIR signal which are indeterminate but could be secondary to migraine headaches, early microvascular disease, post infectious/inflammatory etiology or non-specific gliosis.     SUBJECTIVE       OBJECTIVE   ORAL MOTOR EXAMINATION  Dentition: Natural, Functional  Symmetry: Within Functional Limits  Strength: Within Functional Limits  Tone: Within Functional Limits  Range of Motion: Within Functional Limits  Rate of Motion: Within Functional Limits    Voice Quality: Clear  Respiratory Status: Unlabored  Consistencies Trialed: Thin liquids, Puree, Hard solid  Method of Presentation: Self presentation  Patient Positioned: Upright, Midline    Oral Phase of Swallow: Within Functional Limits                      Pharyngeal Phase of Swallow: Within Functional Limits           (Please note: Silent aspiration cannot be evaluated clinically. Videofluoroscopic Swallow Study is required to rule-out silent aspiration.)       Comments:                   FOLLOW UP        Follow Up Needed (Documentation Required): No       Thank you for your referral.   If you have any questions, please contact Shyla Maradiaga, SLP

## 2025-03-08 NOTE — DISCHARGE SUMMARY
[facility]     Paula Hugo Patient Status:  Inpatient    1980 MRN [id number]   Formerly [facility] 3NE-A Attending Mile Luna MD   Hosp Day # 1 PCP HARSH MALHOTRA     Date of Admission: 3/7/2025  Date of Discharge:   ***    Discharge Disposition: Home or Self Care    Discharge Diagnosis:  ***    History of Present Illness: ***    Brief Synopsis: ***    Lace+ Score: 37  59-90 High Risk  29-58 Medium Risk  0-28   Low Risk       TCM Follow-Up Recommendation:  {Care Managers will evaluate the need for follow-up for all patients ages 50+, and high/moderate risk patients ages 25-49. Low risk patients (LACE < 29) will only be evaluated if the \"Still recommend for TCM follow-up\" option is selected from this list.:[id number]}      Procedures during hospitalization:   ***    Incidental or significant findings and recommendations (brief descriptions):  ***    Lab/Test results pending at Discharge:   ***    Consultants:  ***    Discharge Medication List:     Discharge Medications        CONTINUE taking these medications        Instructions Prescription details   Accu-Chek Softclix Lancets Misc      Use to check blood glucose twice daily. Dx E11.65.   Quantity: 200 each  Refills: 1     Dexcom G7 Sensor Misc      1 each Every 10 days. Use as directed every 10 days   Quantity: 3 each  Refills: 11            ASK your doctor about these medications        Instructions Prescription details   Accu-Chek Guide Test Strp  Generic drug: Glucose Blood      Use to check blood glucose twice daily. Dx E11.65.   Quantity: 200 each  Refills: 1     Cevimeline HCl 30 MG Caps  Commonly known as: EVOXAC  Ask about: Which instructions should I use?      Take 1 capsule (30 mg total) by mouth 3 (three) times daily.   Quantity: 270 capsule  Refills: 1     ergocalciferol 1.25 MG (82897 UT) Caps  Commonly known as: Vitamin D2  Ask about: Which instructions should I use?      Take 1 capsule (50,000 Units total) by  mouth twice a week.   Refills: 0     Farxiga 10 MG Tabs  Generic drug: dapagliflozin      Take 1 tablet (10 mg total) by mouth daily. Can hold until diet improves   Quantity: 30 tablet  Refills: 5     metFORMIN  MG Tb24  Commonly known as: Glucophage XR      TAKE 1 TABLET (500 MG TOTAL) BY MOUTH 2 (TWO) TIMES DAILY WITH MEALS. CAN HOLD UNTIL DIET IMPROVES   Quantity: 180 tablet  Refills: 0     Synthroid 125 MCG Tabs  Generic drug: levothyroxine      Take 1 tablet (125 mcg total) by mouth before breakfast. Synthroid brand name only   Quantity: 90 tablet  Refills: 1              [place]MP reviewed: ***    Follow-up appointment:   No follow-up provider specified.  Appointments for Next 30 Days 3/8/2025 - 4/7/2025      None            Vital signs:  Temp:  [97.7 °F (36.5 °C)-98.2 °F (36.8 °C)] 97.7 °F (36.5 °C)  Pulse:  [] 103  Resp:  [16-19] 16  BP: ()/(67-82) 99/72  SpO2:  [94 %-99 %] 94 %    Physical Exam:    General: No acute distress   Lungs: clear to auscultation  Cardiovascular: S1, S2  Abdomen: Soft  ***    -----------------------------------------------------------------------------------------------  PATIENT DISCHARGE INSTRUCTIONS: See electronic chart    Mile Luna MD    Total time spent on discharge planning:  *** minutes     The 21st Century Cures Act makes medical notes like these available to patients in the interest of transparency. Please be advised this is a medical document. Medical documents are intended to carry relevant information, facts as evident, and the clinical opinion of the practitioner. The medical note is intended as peer to peer communication and may appear blunt or direct. It is written in medical language and may contain abbreviations or verbiage that are unfamiliar.      TABLET (500 MG TOTAL) BY MOUTH 2 (TWO) TIMES DAILY WITH MEALS. CAN HOLD UNTIL DIET IMPROVES   Quantity: 180 tablet  Refills: 0     Synthroid 125 MCG Tabs  Generic drug: levothyroxine      Take 1 tablet (125 mcg total) by mouth before breakfast. Synthroid brand name only   Quantity: 90 tablet  Refills: 1              [place] reviewed[place] n/a    Follow-up appointment:   No follow-up provider specified.  Appointments for Next 30 Days 3/8/2025 - 2025      None            Vital signs:  Temp:  [97.7 °F (36.5 °C)-98.2 °F (36.8 °C)] 97.7 °F (36.5 °C)  Pulse:  [] 103  Resp:  [16-19] 16  BP: ()/(67-82) 99/72  SpO2:  [94 %-99 %] 94 %    Physical Exam:    General: No acute distress   Lungs: clear to auscultation  Cardiovascular: S1, S2  Abdomen: Soft      -----------------------------------------------------------------------------------------------  PATIENT DISCHARGE INSTRUCTIONS: See electronic chart    Mile Luna MD    Total time spent on discharge plannin minutes     The  Century Cures Act makes medical notes like these available to patients in the interest of transparency. Please be advised this is a medical document. Medical documents are intended to carry relevant information, facts as evident, and the clinical opinion of the practitioner. The medical note is intended as peer to peer communication and may appear blunt or direct. It is written in medical language and may contain abbreviations or verbiage that are unfamiliar.

## 2025-03-08 NOTE — PROGRESS NOTES
NURSING ADMISSION NOTE      Patient admitted via Cart  Oriented to room.  Safety precautions initiated.  Bed in low position.  Call light in reach.    Pt oriented x 4   Place tele and   Sinus/sinus tach on tele   Stroke protocol   Neuro checks q 2 til 0600  Iv fluid started, NPO    Pt updated on poc and verbalizes understanding.

## 2025-03-08 NOTE — ED QUICK NOTES
Orders for admission, patient is aware of plan and ready to go upstairs. Any questions, please call ED RN Aristides at extension [phone].     Patient Covid vaccination status: Fully vaccinated     COVID Test Ordered in ED: None    COVID Suspicion at Admission: N/A    Running Infusions:      Mental Status/LOC at time of transport: x4    Other pertinent information:   CIWA score: N/A   NIH score:  8

## 2025-03-08 NOTE — CONSULTS
[facility]  Report of Psychiatric Consultation    Paula Hugo Patient Status:  Inpatient    1980 MRN [id number]   Location [facility] 3NE-A Attending Mile Luna MD   Hosp Day # 1 PCP HARSH MALHOTRA     Date of Admission: 3/7/2025  Date of Consult: 3/8/2025   Reason for Consultation: \"Evaluate for underlying psych issues including anxiety versus malingering\"    Impression: Patient is a 44-year-old female who presented to the hospital yesterday.     Primary Psychiatric Diagnosis:  Anxiety disorder, unspecified      Rule Out Diagnoses:  Generalized anxiety disorder   Adjustment disorder     Personality Traits:  Deferred      Pertinent Medical Diagnoses:  Possible TIA vs migraine, diabetes, HTN, history of thyroid cancer      Recommendations:  No indication for initiation of a psychiatric medication at this time.     Did offer outpatient psychotherapy referrals for patient. However, she declined as she plans to see a therapist through the psychology center that her son attends.     Patient is not in need of inpatient psychiatric care and may discharge home from a psychiatry standpoint once medically cleared to do so.     Psychiatry will sign off.     RAMSEY Crockett, [facility]     History of Present Illness:  Patient is a 44-year-old female who presented to the hospital yesterday due to left facial tingling and headache. Stroke alert was called. While in the ED, her left-sided weakness improved but patient then reported right-sided weakness. Per chart review, she was given a migraine cocktail. CT head and MRI brain noted to be negative. Patient was medically admitted under stroke protocol. Psychiatry is consulted today to evaluate for anxiety versus possible malingering.     This morning, patient is seen sitting on the couch eating her breakfast. She does endorse a headache earlier but denies currently. She reports her right side still feels slightly weaker but has improved greatly compared  to yesterday. When asked about stressors at home, she does endorse that she is a busy mom of 4 children, including her youngest who has autism. Additionally, she reports going through thyroid cancer treatment 2 years ago and constantly having to be mindful of her thyroid levels and thyroid medications. In terms of how she has been feeling emotionally lately, she reports feeling \"mentally confident\". She details that she loves caring for her kids and loves participating in her hobbies such as gardening, walking, and practicing her usman. Throughout the conversation, she frequently states how she wants to go home to be with her kids. Reports that she loves her life and, while she may have a bad day every once in a while, the good days make up for it.     She denies feeling hopeless and denies ever having thoughts that life is no longer worth living. Typically, she sleeps and eats well. Feels well-rested during the day and tries to choose healthy foods. She does endorse some worrying but feels that she honey well with her busy schedule. Denies panic attacks. Denies symptoms of psychosis.     She reports that she has considered seeing a therapist in the past to discuss the stressors she deals with on a daily basis, such as caring for her son with autism while caring for her own health issues. Did offer outpatient psychotherapy referrals to patient. However, she declined as she reports that she plans to establish care with a therapist at her son's psychology center when she is ready to do so.     Past Psychiatric History:  No history of inpatient psychiatric hospitalizations or suicide attempts. Denies receiving psychiatric treatment previously. Does not take psychiatric medications currently.     Substance Use History:  Denies. No BAL or UDS drawn upon admission.     Psych Family History:  Denies.     Social and Developmental History:   She lives in [place], [place] with her  and children. They have a total of 4  children. Her two oldest are girls and are currently away at college at the [employer]. Her two youngest are boys. Her youngest son who is 11 years old is autistic. 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 Denies history of trauma.     Past Medical History:    Diabetes (HCC)    Disorder of thyroid    High blood pressure    High cholesterol    History of COVID-19    Had flu like S/S x 2-3 days.    Hypothyroidism    Neuropathy    feet    PONV (postoperative nausea and vomiting)    Thyroid cancer (HCC)    Visual impairment    glasses     Past Surgical History:   Procedure Laterality Date      2002      06/15/2005      2007      10/17/2013    Thyroidectomy       Family History   Problem Relation Age of Onset    Diabetes Mother       reports that she has never smoked. She has never used smokeless tobacco. She reports that she does not drink alcohol and does not use drugs.    Allergies:  Allergies[1]    Medications:    Current Facility-Administered Medications:     levothyroxine (Synthroid) tab 125 mcg, 125 mcg, Oral, Before breakfast    glucose (Dex4) 15 GM/59ML oral liquid 15 g, 15 g, Oral, Q15 Min PRN **OR** glucose (Glutose) 40% oral gel 15 g, 15 g, Oral, Q15 Min PRN **OR** glucose-vitamin C (Dex-4) chewable tab 4 tablet, 4 tablet, Oral, Q15 Min PRN **OR** dextrose 50% injection 50 mL, 50 mL, Intravenous, Q15 Min PRN **OR** glucose (Dex4) 15 GM/59ML oral liquid 30 g, 30 g, Oral, Q15 Min PRN **OR** glucose (Glutose) 40% oral gel 30 g, 30 g, Oral, Q15 Min PRN **OR** glucose-vitamin C (Dex-4) chewable tab 8 tablet, 8 tablet, Oral, Q15 Min PRN    sodium chloride 0.9% infusion, , Intravenous, Continuous    acetaminophen (Tylenol) tab 650 mg, 650 mg, Oral, Q4H PRN **OR** acetaminophen (Tylenol) rectal suppository 650 mg, 650 mg, Rectal, Q4H PRN    labetalol (Trandate) 5 mg/mL injection 10 mg, 10 mg, Intravenous, Q10 Min PRN    hydrALAzine  (Apresoline) 20 mg/mL injection 10 mg, 10 mg, Intravenous, Q2H PRN    ondansetron (Zofran) 4 MG/2ML injection 4 mg, 4 mg, Intravenous, Q6H PRN    aspirin 300 MG rectal suppository 300 mg, 300 mg, Rectal, Daily **OR** aspirin tab 325 mg, 325 mg, Oral, Daily    enoxaparin (Lovenox) 40 MG/0.4ML SUBQ injection 40 mg, 40 mg, Subcutaneous, Daily    acetaminophen (Tylenol Extra Strength) tab 500 mg, 500 mg, Oral, Q4H PRN    melatonin tab 3 mg, 3 mg, Oral, Nightly PRN    atorvastatin (Lipitor) tab 40 mg, 40 mg, Oral, Nightly    insulin aspart (NovoLOG) 100 Units/mL FlexPen 1-10 Units, 1-10 Units, Subcutaneous, TID AC and HS    nitroglycerin (Nitrostat) SL tab 0.4 mg, 0.4 mg, Sublingual, Q5 Min PRN    Review of Systems   Neurological:  Positive for focal weakness and headaches. Negative for dizziness, tingling, tremors, sensory change, speech change, seizures, loss of consciousness and weakness.   Psychiatric/Behavioral:  Negative for depression, hallucinations, memory loss, substance abuse and suicidal ideas. The patient is nervous/anxious. The patient does not have insomnia.    All other systems reviewed and are negative.    Psychiatry Review Systems: No voices or visions. No elevated mood, racing thoughts, decreased need for sleep, grandiose thoughts, increased participation in risky behaviors, or history of trauma.     Mental Status Exam:     Risk Assessment  Suicidal ideation: no suicidal ideation  Homicidal ideation: None noted    Appearance: unremarkable  Behavior: unremarkable  Attitude: cooperative and consistent  Gait: Not observed     Speech: normal rate, rhythm and volume    Mood: euthymic  Affect: Congruent    Thought process: logical and sequential  Thought content: no delusions, obsessions, or other thought abnormalities  Perceptions: no hallucinations  Associations: Intact    Orientation: Alert and oriented x 4  Attention and Concentration:   good  Memory:  intact immediate, recent, remote  Language: Intact  naming and repetition  Fund of Knowledge: Able to recite name of [place] president    Insight: good   Judgment: good     Objective    Vitals:    03/08/25 0800   BP: 98/80   Pulse: 93   Resp: 16   Temp: 97.7 °F (36.5 °C)     Patient Strengths/Assets: Kind, hardworking      Suicide Risk Assessments:  Overall level of suicide risk is low      Risk Factors: anxiety      Environmental Factors: lives at home with  and children, does not currently see outpatient psychiatric providers, does not take psychiatric medication      Protective Factors: family, hobbies      Laboratory Data:  Lab Results   Component Value Date    WBC 4.8 03/08/2025    HGB 13.1 03/08/2025    HCT 38.2 03/08/2025    .0 03/08/2025    CREATSERUM 0.44 03/08/2025    BUN 8 03/08/2025     03/08/2025    K 3.4 03/08/2025     03/08/2025    CO2 25.0 03/08/2025     03/08/2025    CA 8.7 03/08/2025    ALB 4.1 03/08/2025    ALKPHO 59 03/08/2025    BILT 0.6 03/08/2025    TP 6.1 03/08/2025    AST 25 03/08/2025    ALT 35 03/08/2025    PTT 29.8 03/07/2025    INR 0.93 03/07/2025    PTP 12.6 03/07/2025    TSH 1.339 03/07/2025    ESRML 17 03/07/2025    CRP <0.40 03/07/2025    MG 1.8 03/08/2025    PHOS 3.9 03/08/2025    B12 555 03/07/2025    PGLU 123 03/08/2025       STUDIES:    Claudine Adams, RAMSEY, PMHNP-BC  3/8/2025  8:23 AM         [1]   Allergies  Allergen Reactions    Penicillins HIVES

## 2025-03-09 LAB
ATRIAL RATE: 95 BPM
P AXIS: 39 DEGREES
P-R INTERVAL: 160 MS
Q-T INTERVAL: 382 MS
QRS DURATION: 72 MS
QTC CALCULATION (BEZET): 480 MS
R AXIS: 38 DEGREES
T AXIS: 45 DEGREES
VENTRICULAR RATE: 95 BPM

## 2025-03-09 NOTE — PROGRESS NOTES
Duplicate. Refill sent in separate encounter.    Pt  mentioned that her shoes are missing from the time she went down for MRI before coming up on the unit. RN called both CT and MRI to search for them but was not found. Charge RN also  physically went down to look for them but could not find them. Pt was not happy about the situation and requested to fill out an incident with public safety. Incident report was filled. Pt spoke to public safety.

## 2025-03-10 ENCOUNTER — PATIENT OUTREACH (OUTPATIENT)
Dept: CASE MANAGEMENT | Age: 45
End: 2025-03-10

## 2025-03-10 NOTE — PROGRESS NOTES
Neuro/Stroke appointment request (discharged 03/08)    Neuro/Stroke - per RN Stroke Navigator; no stroke follow up needed    Dr Kev Ray [address] [address]  [phone]  Patient declined appointment; advised she will call to schedule her appointment  Patient requested to talk with someone about an issue she had while in the hospital; patient's croc's were lost and wanted to report it and had some issues; transferred pt to the Patient Advocate line [phone] to talk with someone  Closing encounter

## 2025-03-19 NOTE — PAYOR COMM NOTE
--------------  ADMISSION REVIEW     Payor: [employer] OUT OF STATE P[employer]  Subscriber #:  [id number]  Authorization Number: [id number]    Admit date: 3/7/25  Admit time:  9:40 PM       REVIEW DOCUMENTATION:  ED Provider Notes signed by Apolonia Garrett MD at 3/7/2025  7:32 PM      Patient Seen in: [facility]      History     Chief Complaint   Patient presents with    Swelling    Numbness     Stated Complaint: left side of face numb, pins and needles, HA, lump to neck. last normal 30 don*    Subjective:     44-year-old female comes to the emergency department with her  for evaluation of left-sided numbness as well as pins-and-needles involving the entire left side of her body that began 30 minutes prior to arrival in the emergency department or approximately 1:45 PM.  Patient also states that she has had a left-sided headache for the past 2 to 3 days.  She does not typically get headaches.  No vision changes, speech difficulty or weakness.    Objective:     Past Medical History:    Diabetes (HCC)    Disorder of thyroid    High blood pressure    High cholesterol    History of COVID-19    Had flu like S/S x 2-3 days.    Hypothyroidism    Neuropathy    feet    PONV (postoperative nausea and vomiting)    Thyroid cancer (HCC)    Visual impairment    glasses       ED Triage Vitals   BP 03/07/25 1426 120/89   Pulse 03/07/25 1426 106   Resp 03/07/25 1426 17   Temp 03/07/25 1509 98 °F (36.7 °C)   Temp src 03/07/25 1509 Temporal   SpO2 03/07/25 1426 98 %   O2 Device 03/07/25 1426 None (Room air)       Current Vitals:   Vital Signs  BP: 96/70  Pulse: 98  Resp: 18  Temp: 98 °F (36.7 °C)  Temp src: Temporal  MAP (mmHg): 79    Oxygen Therapy  SpO2: 97 %  O2 Device: None (Room air)        Physical Exam   General Appearance: This is an anxious and tearful middle-aged female lying on a gurney.  Vital signs were reviewed per nurses notes.  Monitor reveals a sinus tachycardia rate of 106.  Initial blood  pressure was 120/89.  HEENT: Normocephalic/atraumatic.  Anicteric sclera.  Oral mucosa is moist.  Oropharynx is normal.  Facies are symmetrical.  Pupils equal round reactive to light.  Extraocular movements are intact.  Neck is nontender without adenopathy or thyromegaly.  Lungs are clear to auscultation.  Heart exam: Normal S1-S2 without extra sounds or murmurs.  Rapid rate, regular rhythm.  Abdomen is nontender.  Extremities are atraumatic.  Skin is dry without rashes or lesions.  Upon arrival neural exam was as follows: Fluent speech.  Oriented x 4.  No facial droop notable.  No pronator drift.  Weakness of left foot plantar and dorsi flexion.  ED Course     Labs Reviewed   COMP METABOLIC PANEL (14) - Abnormal; Notable for the following components:       Result Value    Glucose 180 (*)     AST 39 (*)     Albumin 5.1 (*)     A/G Ratio 2.1 (*)     All other components within normal limits   CBC WITH DIFFERENTIAL WITH PLATELET - Abnormal; Notable for the following components:    RBC 5.39 (*)     All other components within normal limits   POCT GLUCOSE - Abnormal; Notable for the following components:    POC Glucose 177 (*)     All other components within normal limits   PROTHROMBIN TIME (PT) - Normal   PTT, ACTIVATED - Normal   TROPONIN I HIGH SENSITIVITY - Normal   TROPONIN I HIGH SENSITIVITY - Normal   RAINBOW DRAW LAVENDER   RAINBOW DRAW LIGHT GREEN   RAINBOW DRAW BLUE   RAINBOW DRAW GOLD     EKG    Rate, intervals and axes as noted on EKG Report.  Rate: 145  Rhythm: Sinus tachycardia  Reading: Nonspecific ST and T wave abnormality.  Abnormal EKG.  Agree with EKG report.  Of note, this EKG was obtained after the patient returned from CAT scan and the patient was openly crying.    Code stroke was called and the patient went to CT.    CT STROKE CTA BRAIN/CTA NECK (W IV)(CPT=70496/81088)    Addendum Date: 3/7/2025    ADDENDUM:  Correction of the above time, The case was discussed at 1444 hours on 3/7/2025 with read  back.  Dictated by (CST): Christopher Green MD on 3/07/2025 at 2:49 PM     Finalized by (CST): Christopher Green MD on 3/07/2025 at 2:51 PM             Result Date: 3/7/2025  PROCEDURE:  CT STROKE CTA BRAIN/CTA NECK (W IV)(CPT=70496/90144)  COMPARISON:  [facility] , CT, CT STROKE BRAIN (NO IV)(CPT=70450), 3/07/2025, 2:29 PM.  External Exams, CT, CT BRAIN OR HEAD (83246), 11/07/2019, 0:28 AM.  INDICATIONS:  eval for stroke  TECHNIQUE  Noncontrast CT scanning is performed through the brain and CT angiography of the head and neck were obtained with non-ionic contrast. MIP images were obtained. Curved planar reformats were performed through the carotid and vertebral arteries. All measurements obtained in this exam were performed using NASCET. Dose reduction techniques were used. Dose information is transmitted to the ACR (American College of Radiology) NRDR (National Radiology Data Registry) which includes the Dose Index Registry.  PATIENT STATED HISTORY:(As transcribed by Technologist)  Code stroke. Left side/ leg weakness.   CONTRAST USED:  75cc of Isovue 370  FINDINGS:  VASCULATURE:  No significant stenosis.  No visible aneurysm or vascular malformation. VENTRICLES:  Normal for age.  No enlargement or displacement. CEREBRUM:  Normal for age.  No excessive atrophy, mass, or hemorrhage, or abnormal enhancement. CEREBELLUM:  Normal for age.  No excessive atrophy, mass, or hemorrhage, or abnormal enhancement. BRAINSTEM:  Normal for age.  No excessive atrophy, mass, or hemorrhage, or abnormal enhancement. BASAL CISTERNS:  No subarachnoid hemorrhage or effacement.  SKULL:  Negative.   LEFT INTERNAL CAROTID:  No hemodynamically significant stenosis or dissection. EXTERNAL CAROTID:  No hemodynamically significant stenosis or dissection COMMON CAROTID:  No hemodynamically significant stenosis or dissection VERTEBRAL:  No hemodynamically significant stenosis or dissection  RIGHT INTERNAL CAROTID:   No hemodynamically significant stenosis  or dissection EXTERNAL CAROTID:    No hemodynamically significant stenosis or dissection COMMON CAROTID:   No hemodynamically significant stenosis or dissection VERTEBRAL:   No hemodynamically significant stenosis or dissection  OTHER:  The visualized soft tissues of the neck are also unremarkable.              CONCLUSION:   1. No evidence of acute intracranial process.  2. Unremarkable CTA of the [id number].  3. Unremarkable CTA of the neck.  Findings were discussed with Dr. Garrett at 1844 hours on 3/7/2025 with read back.   LOCATION:  [facility]   Dictated by (CST): Christopher Green MD on 3/07/2025 at 2:40 PM     Finalized by (CST): Christopher Green MD on 3/07/2025 at 2:45 PM       CT STROKE BRAIN (NO IV)(CPT=70450)    Result Date: 3/7/2025  PROCEDURE:  CT STROKE BRAIN (NO IV)(CPT=70450)  COMPARISON:  External Exams, CT, CT BRAIN OR HEAD ([id number]), 11/07/2019, 0:28 AM.  INDICATIONS:  left side of face numb, pins and needles, HA, lump to neck. last normal 30 minutes ago. left arm and left leg feels weak per patient  TECHNIQUE:  Noncontrast CT scanning is performed through the brain.  Dose reduction techniques were used. Dose information is transmitted to the ACR (American College of Radiology) NRDR (National Radiology Data Registry) which includes the Dose Index Registry.  PATIENT STATED HISTORY: (As transcribed by Technologist)  Left side weakness. Code stroke    FINDINGS:  VENTRICLES/SULCI:  Ventricles and sulci are normal in size.  INTRACRANIAL:  There are no abnormal extraaxial fluid collections.  There is no midline shift.  There are no intraparenchymal brain abnormalities.  There is nothing specific for acute infarct.  There is no hemorrhage or mass lesion.  Focal calcification may be from old infection noted in the right medial frontal lobe cortex. SINUSES:           No sign of acute sinusitis.  MASTOIDS:          No sign of acute inflammation. SKULL:             No evidence for fracture or osseous abnormality.  OTHER:             None.            CONCLUSION:  No evidence of acute intracranial process.  Critical results were called to Dr. Garrett at 1439 hours on 3/7/2025.  There was appropriate read back.    LOCATION:  [facility]   Dictated by (CST): Christopher Green MD on 3/07/2025 at 2:36 PM     Finalized by (CST): Christopher Green MD on 3/07/2025 at 2:39 PM       I personally reviewed the images myself and went over results with patient.    CT plain brain and a CTA of the head and neck were obtained.  I viewed the films myself.  No acute intracranial process and low large vessel occlusion.    Upon reevaluation with the stroke navigator at 1455 the patient's speech was slow but understandable and there was no spontaneous movement of the left leg.    Per stroke neurologist, Dr. Grissom, the patient was taken back for CT perfusion.  Chest x-ray revealed the following findings:  Impression  CONCLUSION:  No acute radiographic findings        LOCATION:  [facility]         Dictated by (CST): Dick Dasilva MD on 3/07/2025 at 4:54 PM      Finalized by (CST): Dick Dasilva MD on 3/07/2025 at 4:55 PM    CT perfusion revealed the following findings:  Impression  CONCLUSION:  There is no evidence of ischemia or infarct by perfusion weighted imaging of the brain.  Findings discussed with Dr. Garrett at 1515 hours on 3/7/2025.        LOCATION:  [facility]        Dictated by (CST): Christopher Green MD on 3/07/2025 at 3:13 PM      Finalized by (CST): Christopher Green MD on 3/07/2025 at 3:15 PM  I viewed the chest x-ray and CT perfusion of the brain films myself and there is no cardiopulmonary abnormality and no evidence of ischemic stroke.    MRI remains pending at the time of dictation.    Case was discussed with and the patient was seen by [facility] hospitalist Dr. García.  General neurologist Dr. Anna was contacted via [employer] regarding hospitalization.    Reglan, Benadryl and Toradol were administered for headache and symptoms were completely  resolved both headache and neurologic deficits after period of observation.  MDM      #1.  Left-sided weakness.  Concern initially was for an acute ischemic stroke.  CT imaging did not bear that out.  Because of associated headache, migraine cocktail was administered and weakness was resolved with the headache with medication.  MRI imaging is pending.  I did speak with the [facility] hospitalist who wanted to observe overnight and repeat troponin.  2.  Acute headache.  See #1.  3.  Type 2 diabetes mellitus.    Admission disposition: 3/7/2025  4:04 PM      Disposition and Plan     Clinical Impression:  1. Left-sided weakness    2. Acute nonintractable headache, unspecified headache type    3. Type 2 diabetes mellitus with hyperglycemia, without long-term current use of insulin (HCC)         Disposition:  Admit  3/7/2025  4:04 pm     Hospital Problems       Present on Admission  Date Reviewed: 2/6/2025            ICD-10-CM Noted POA    * (Principal) Left-sided weakness R53.1 3/7/2025 Unknown    Acquired hypothyroidism E03.9 3/7/2025 Unknown    Acute nonintractable headache R51.9 3/7/2025 Unknown    Diabetes mellitus (HCC) E11.9 Unknown Yes    Gastroesophageal reflux disease K21.9 3/7/2025 Unknown    History of thyroid cancer Z85.850 3/7/2025 Unknown    Hyperlipidemia E78.5 3/7/2025 Unknown    Paresthesia R20.2 3/7/2025 Unknown    Primary hypertension I10 10/27/2023 Yes         TNK/ NI Documentation:    Date/Time last known well:   3/7/2025 1:45 PM    NIHSS on presentation: 2     Chief Complaint   Patient presents with    Swelling    Numbness     IV Tenecteplase (TNK) administered: No; Patient is not a Candidate for IV TNK due to: Symptoms inconsistent with an acute ischemic stroke and CT imaging including CT perfusion negative.    Candidate for Endovascular thrombectomy (EVT): No; Patient is not a candidate for Endovascular Thrombectomy due to: No large vessel occlusion ( LVO)  on CTA/MRA imaging      Disposition: There  is no disposition on file for this visit.            3/7 H&P    Chief Complaint: Initially reported left facial tingling for 30 minutes prior to arrival to the emergency room, headache     History of Present Illness:  Paula Hugo is a 44 year old female admitted with paresthesias with left facial tingling for 30 minutes prior to arrival to the emergency room along with headache.  Headache started 3 days back according to patient.  Patient seen with patient's  at bedside.  Patient has 11-year-old autistic son according to patient and patient's  at bedside.  No other stressors and no new stressfull events in life according to patient and patient's family at bedside.  Denies any cold or cough symptoms.  Denies any runny nose.  Complains of some sore throat previously according to patient.  While in the emergency room patient was found to have sinus tachycardia.  While in the emergency room patient complained of decreased sensation of the left cheek then the left leg and left leg break and then now states she has paresthesias numbness all over the face and has generalized weakness all over the body.  Complains of persisting headache.  Seen by stroke navigator while in the emergency room and neurologist consulted from emergency room and had workup including CT brain and CTA head and neck which came back negative.  Also had CT brain perfusion study per neurologist while still in ER which came back with no evidence of ischemia or infarct by perfusion weighted imaging of the brain.  Patient admitted for further workup and neurology including MRI of the brain to complete workup and will be seen by general neurologist on the floor, will need to rule out complicated migraine versus anxiety versus viral syndrome or other infectious etiology or if all workup negative may need to rule out anxiety or conversion disorder also      #Headache with paresthesias and numbness and transient slurred speech according to  patient.  Neurology consulted from ER  Workup done in ER included  CT brain stroke protocol done in ER on 3/7/2025 with no evidence of acute intracranial process  CTA head and neck done in ER on 3/7/2025 with no evidence of acute intracranial process.  Unremarkable CTA of the [place].  Unremarkable CT of the neck.  CT brain perfusion study done in ER on 3/7/2025 with no evidence of ischemia or infarct in the diffusion-weighted imaging of the brain  MRI of the brain pending  Patient admitted for further workup and neurology including MRI of the brain to complete workup and will be seen by general neurologist on the floor per neurologist, will need to rule out complicated migraine versus anxiety versus viral syndrome or other infectious etiology.  If all workup negative may need to rule out anxiety or conversion disorder also  Will keep on stroke protocol at this time until MRI results back.  Will check 2D echo with bubbles  Check vitamin B12 level with paresthesias  #Sinus tachycardia, nonspecific ST-T wave changes  #Recent sore throat  Will check throat culture with patient's history of sore throat.  White count normal.  Patient afebrile.  Will monitor on telemetry  Will check troponin trend, with patient's nonspecific ST-T wave abnormality with ST depression lateral leads and nonspecific T wave abnormality in inferior and anterolateral leads.  Will also consult cardiology as patient with no history of prior heart disease and has underlying risk factors for heart disease including diabetes, hypertension and hyperlipidemia.  2D echo with bubble study   Check ESR/sed rate and CRP and antistreptolysin antibody   Follow TSH with reflex T4 with patient sinus tachycardia  #History of thyroid cancer with prior total thyroidectomy according to patient and patient's  at bedside  #Acquired hypothyroidism  Check TSH with reflex T4  Continue home levothyroxine  #Diabetes mellitus type 2  Hyperglycemia  protocol  Hold home oral hypoglycemics while in hospital  Last hemoglobin A1c checked in epic was 12/5/2024 was 6.3.  Follow Accu-Cheks  NovoLog correction factor coverage as needed, if blood sugar starts trending higher will add carb counting and may need Tresiba.  Check hemoglobin A1c  #Hyperlipidemia  Continue home medication  #Hypertension  Follow blood pressure  Will plan to continue home medication  #GERD  Continue home PPI          3/8 Neurology    History of Presenting Illness  44 year old female with history of multiple medical problems including diabetes, hypothyroidism, hypertension, hyperlipidemia, neuropathy presented to the hospital with symptoms of lump in the neck, facial tingling and numbness on the left side.  Patient also complaining of headache for the past 2 to 3 days.  Patient was evaluated in the emergency department and was not deemed a candidate for TNK/neurointervention.  Currently patient symptoms of all resolved.  Patient back to baseline.  Denies any diplopia, dysarthria, dysphagia, weakness, numbness, paresthesias.      diphenhydrAMINE (Benadryl) 50 mg/mL injection 50 mg  Dose: 50 mg  Freq: Once Route: IV  Start: 03/07/25 1518 End: 03/07/25 1530     1530 LA-Given                     ketorolac (Toradol) 15 MG/ML injection 15 mg  Dose: 15 mg  Freq: Once Route: IV  Start: 03/07/25 1518 End: 03/07/25 1530     1530 LA-Given           levothyroxine (Synthroid) tab 125 mcg  Dose: 125 mcg  Freq: Before breakfast Route: OR  Start: 03/08/25 0700 End: 03/08/25 2017   Admin Instructions:   Give on an empty stomach. Hold tube feedings 1 hour before AND after.      0826 RI-Given   2017-D/C'd       magnesium oxide (Mag-Ox) tab 400 mg  Dose: 400 mg  Freq: Once Route: OR  Start: 03/08/25 1130 End: 03/08/25 1135      1135 RI-Given          metoclopramide (Reglan) 5 mg/mL injection 10 mg  Dose: 10 mg  Freq: Once Route: IV  Start: 03/07/25 1518 End: 03/07/25 1530     1530 LA-Given           potassium  chloride (Klor-Con M20) tab 40 mEq  Dose: 40 mEq  Freq: Every 4 hours Route: OR  Start: 03/08/25 1130 End: 03/08/25 1530   Admin Instructions:   Do not crush      1136 RI-Given   1530 RI-Given         sodium chloride 0.9 % IV bolus 1,000 mL  Dose: 1,000 mL  Freq: Once Route: IV  Last Dose: Stopped (03/07/25 2131)  Start: 03/07/25 1944 End: 03/07/25 2131 1943 LA-New Bag   2131 RB-Stopped              sodium chloride 0.9% infusion  Rate: 75 mL/hr  Freq: Continuous Route: IV  Start: 03/07/25 2200 End: 03/08/25 2017   Admin Instructions:   After 48 hours saline lock if taking PO     2216 LT-New Bag        1136 RI-New Bag   2017-D/C'd         Date/Time Temp Pulse Resp BP SpO2 Weight O2 Device O2 Flow Rate (L/min) [facility]   03/08/25 1712 97.9 °F (36.6 °C) 102 16 125/98 Abnormal  94 % -- None (Room air) --    03/08/25 1400 97.9 °F (36.6 °C) 100 16 102/78 94 % -- None (Room air) --    03/08/25 1200 97.7 °F (36.5 °C) 103 16 99/72 94 % -- None (Room air) --    03/08/25 1000 97.7 °F (36.5 °C) 71 16 94/70 94 % -- None (Room air) --    03/08/25 0800 97.7 °F (36.5 °C) 93 16 98/80 97 % -- None (Room air) -- [facility]   03/08/25 0600 -- 79 -- -- 95 % -- -- -- RS   03/08/25 0600 -- -- -- 94/67 -- -- -- -- LT   03/08/25 0400 -- 79 -- 101/72 95 % -- None (Room air) -- LT   03/08/25 0200 -- 84 -- 95/71 95 % -- -- --    03/08/25 0000 97.7 °F (36.5 °C) 93 16 98/67 95 % -- -- -- RS   03/07/25 2200 98.2 °F (36.8 °C) 93 18 102/70 98 % 163 lb 9.3 oz (74.2 kg) -- -- RS   03/07/25 2045 -- 89 19 100/73 97 % -- -- -- LA   03/07/25 2001 -- 90 18 94/73 99 % -- -- -- LA   03/07/25 1815 -- 98 18 96/70 97 % -- None (Room air) -- LA   03/07/25 1645 -- 105 19 97/73 97 % -- None (Room air) -- LA   03/07/25 1624 -- -- -- -- -- 161 lb 13.1 oz (73.4 kg) -- -- SP   03/07/25 1530 -- 98 16 108/82 95 % -- None (Room air) -- LA   03/07/25 1509 98 °F (36.7 °C) -- -- -- -- -- -- -- MK   03/07/25 1445 -- -- -- -- -- 161 lb 13.4 oz (73.4 kg) -- -- MK   03/07/25  1426 -- 106 17 120/89 98 % -- None (Room air) -- MK           --------------  DISCHARGE REVIEW    Payor: [facility] OUT OF STATE PPO  Subscriber #:  [id number]  Authorization Number: [id number]    Admit date: 3/7/25  Admit time:   9:40 PM  Discharge Date: 3/8/2025  6:17 PM     Admitting Physician: Sujey García MD  Attending Physician:  No att. providers found  Primary Care Physician: Harsh Malhotra          Discharge Summary Notes        Discharge Summary signed by Mile Luna MD at 3/18/2025  7:14 PM       Author: Mile Luna MD Specialty: HOSPITALIST, Internal Medicine Author Type: Physician    Filed: 3/18/2025  7:14 PM Date of Service: 3/8/2025  2:43 PM Status: Signed    : Mile Luna MD (Physician)           [facility]     Paula Hugo Patient Status:  Inpatient    1980 MRN [id number]   [facility] 3NE-A Attending Mile Luna MD   Hosp Day # 1 PCP HARSH MALHOTRA     Date of Admission: 3/7/2025  Date of Discharge:   3/8/2025    Discharge Disposition: Home or Self Care    Discharge Diagnosis:  Headache and paresthesias  Hypothyroidism  Diabetes mellitus type 2  Hyperlipidemia  Hypertension  GERD    History of Present Illness:   Paula Hugo is a 44 year old female admitted with paresthesias with left facial tingling for 30 minutes prior to arrival to the emergency room along with headache.  Headache started 3 days back according to patient.  Patient seen with patient's  at bedside.  Patient has 11-year-old autistic son according to patient and patient's  at bedside.  No other stressors and no new stressfull events in life according to patient and patient's family at bedside.  Denies any cold or cough symptoms.  Denies any runny nose.  Complains of some sore throat previously according to patient.  While in the emergency room patient was found to have sinus tachycardia.  While in the emergency room patient complained of decreased  sensation of the left cheek then the left leg and left leg break and then now states she has paresthesias numbness all over the face and has generalized weakness all over the body.  Complains of persisting headache.  Seen by stroke navigator while in the emergency room and neurologist consulted from emergency room and had workup including CT brain and CTA head and neck which came back negative.  Also had CT brain perfusion study per neurologist while still in ER which came back with no evidence of ischemia or infarct by perfusion weighted imaging of the brain.  Patient admitted for further workup and neurology including MRI of the brain to complete workup and will be seen by general neurologist on the floor, will need to rule out complicated migraine versus anxiety versus viral syndrome or other infectious etiology or if all workup negative may need to rule out anxiety or conversion disorder also       Brief Synopsis:   Patient is a 44-year-old female admitted with transient headache and paresthesia.  She was ruled out for an acute CVA and seen by neurology.  Her workup was unremarkable.  Her symptoms resolved.  She was discharged home in good condition    Lace+ Score: 37  59-90 High Risk  29-58 Medium Risk  0-28   Low Risk       TCM Follow-Up Recommendation:  LACE 29-58: Moderate Risk of readmission after discharge from the hospital.      Procedures during hospitalization:   none    Consultants:  Neurology    Discharge Medication List:     Discharge Medications        CONTINUE taking these medications        Instructions Prescription details   Accu-Chek Softclix Lancets Misc      Use to check blood glucose twice daily. Dx E11.65.   Quantity: 200 each  Refills: 1     Dexcom G7 Sensor Misc      1 each Every 10 days. Use as directed every 10 days   Quantity: 3 each  Refills: 11            ASK your doctor about these medications        Instructions Prescription details   Accu-Chek Guide Test Strp  Generic drug: Glucose  Blood      Use to check blood glucose twice daily. Dx E11.65.   Quantity: 200 each  Refills: 1     Cevimeline HCl 30 MG Caps  Commonly known as: EVOXAC  Ask about: Which instructions should I use?      Take 1 capsule (30 mg total) by mouth 3 (three) times daily.   Quantity: 270 capsule  Refills: 1     ergocalciferol 1.25 MG (38398 UT) Caps  Commonly known as: Vitamin D2  Ask about: Which instructions should I use?      Take 1 capsule (50,000 Units total) by mouth twice a week.   Refills: 0     Farxiga 10 MG Tabs  Generic drug: dapagliflozin      Take 1 tablet (10 mg total) by mouth daily. Can hold until diet improves   Quantity: 30 tablet  Refills: 5     metFORMIN  MG Tb24  Commonly known as: Glucophage XR      TAKE 1 TABLET (500 MG TOTAL) BY MOUTH 2 (TWO) TIMES DAILY WITH MEALS. CAN HOLD UNTIL DIET IMPROVES   Quantity: 180 tablet  Refills: 0     Synthroid 125 MCG Tabs  Generic drug: levothyroxine      Take 1 tablet (125 mcg total) by mouth before breakfast. Synthroid brand name only   Quantity: 90 tablet  Refills: 1              [place] n/[place]    Follow-up appointment:   No follow-up provider specified.  Appointments for Next 30 Days 3/8/2025 - 2025      None            Vital signs:  Temp:  [97.7 °F (36.5 °C)-98.2 °F (36.8 °C)] 97.7 °F (36.5 °C)  Pulse:  [] 103  Resp:  [16-19] 16  BP: ()/(67-82) 99/72  SpO2:  [94 %-99 %] 94 %    Physical Exam:    General: No acute distress   Lungs: clear to auscultation  Cardiovascular: S1, S2  Abdomen: Soft      -----------------------------------------------------------------------------------------------  PATIENT DISCHARGE INSTRUCTIONS: See electronic chart    Mile Luna MD    Total time spent on discharge plannin minutes     The  [employer] Act makes medical notes like these available to patients in the interest of transparency. Please be advised this is a medical document. Medical documents are intended to carry relevant information,  facts as evident, and the clinical opinion of the practitioner. The medical note is intended as peer to peer communication and may appear blunt or direct. It is written in medical language and may contain abbreviations or verbiage that are unfamiliar.       Electronically signed by Mile Luna MD on 3/18/2025  7:14 PM         REVIEWER COMMENTS

## 2025-03-21 DIAGNOSIS — E78.5 DYSLIPIDEMIA: ICD-10-CM

## 2025-03-21 DIAGNOSIS — R79.89 LOW VITAMIN D LEVEL: ICD-10-CM

## 2025-03-21 DIAGNOSIS — E11.42 DIABETIC PERIPHERAL NEUROPATHY (HCC): ICD-10-CM

## 2025-03-21 DIAGNOSIS — C73 THYROID CANCER (HCC): ICD-10-CM

## 2025-03-21 DIAGNOSIS — R73.09 HIGH GLUCOSE LEVEL: ICD-10-CM

## 2025-03-21 DIAGNOSIS — I10 PRIMARY HYPERTENSION: ICD-10-CM

## 2025-03-21 DIAGNOSIS — E11.21 DIABETIC NEPHROPATHY ASSOCIATED WITH TYPE 2 DIABETES MELLITUS (HCC): ICD-10-CM

## 2025-03-21 DIAGNOSIS — E78.2 MIXED HYPERLIPIDEMIA: ICD-10-CM

## 2025-03-21 DIAGNOSIS — R74.01 HIGH LIVER TRANSAMINASE LEVEL: ICD-10-CM

## 2025-03-21 DIAGNOSIS — E53.8 LOW SERUM VITAMIN B12: ICD-10-CM

## 2025-03-21 DIAGNOSIS — E89.0 S/P COMPLETE THYROIDECTOMY: ICD-10-CM

## 2025-03-21 DIAGNOSIS — E89.0 HYPOTHYROIDISM, POSTSURGICAL: ICD-10-CM

## 2025-03-21 DIAGNOSIS — E07.9 THYROID DISEASE: ICD-10-CM

## 2025-03-21 DIAGNOSIS — E11.65 UNCONTROLLED TYPE 2 DIABETES MELLITUS WITH HYPERGLYCEMIA (HCC): ICD-10-CM

## 2025-03-21 RX ORDER — SEMAGLUTIDE 1.34 MG/ML
1 INJECTION, SOLUTION SUBCUTANEOUS
Qty: 9 ML | Refills: 0 | Status: SHIPPED | OUTPATIENT
Start: 2025-03-21

## 2025-03-24 ENCOUNTER — OFFICE VISIT (OUTPATIENT)
Facility: LOCATION | Age: 45
End: 2025-03-24
Payer: COMMERCIAL

## 2025-03-24 DIAGNOSIS — R59.0 CERVICAL LYMPHADENOPATHY: Primary | ICD-10-CM

## 2025-03-24 DIAGNOSIS — H69.92 DYSFUNCTION OF LEFT EUSTACHIAN TUBE: ICD-10-CM

## 2025-03-24 PROCEDURE — 99214 OFFICE O/P EST MOD 30 MIN: CPT | Performed by: OTOLARYNGOLOGY

## 2025-03-24 NOTE — PROGRESS NOTES
Otolaryngology Consultation Note     HPI: 43 y/o F previously seen by Dr. Can for papillary thyroid cancer. Here for follow up. Underwent CURRY and notes left facial swelling/lump near jaw since treatment. Recently complains of left facial numbness/tingling and dysphasia. Went to ER where she was diagnosed with TIA. Cont to have left neck mass which she is concerned about. No fever/chills. Notes left aural fullness and tenderness that radiates down left neck. No other new complaints since last visit.      Past Medical History:   Past Medical History:    Diabetes (HCC)    Disorder of thyroid    High blood pressure    High cholesterol    History of COVID-19    Had flu like S/S x 2-3 days.    Hypothyroidism    Neuropathy    feet    PONV (postoperative nausea and vomiting)    Thyroid cancer (HCC)    Visual impairment    glasses        Past Surgical History:   Past Surgical History:   Procedure Laterality Date      2002      06/15/2005      2007      10/17/2013    Thyroidectomy          Medication: Scheduled Meds:  Continuous Infusions:  PRN Meds:.     Allergies:  Allergies[1]  Pertinent Family History:   Family History   Problem Relation Age of Onset    Diabetes Mother         Pertinent Social History:   Social History     Socioeconomic History    Marital status:      Spouse name: Not on file    Number of children: Not on file    Years of education: Not on file    Highest education level: Not on file   Occupational History    Not on file   Tobacco Use    Smoking status: Never    Smokeless tobacco: Never   Vaping Use    Vaping status: Never Used   Substance and Sexual Activity    Alcohol use: Never    Drug use: Never    Sexual activity: Not on file   Other Topics Concern    Caffeine Concern Yes     Comment: 1-2 cups a day    Exercise Not Asked    Seat Belt Not Asked    Special Diet Yes     Comment: 2-3 x week    Stress Concern Not Asked    Weight Concern Not Asked      Service Not Asked    Blood Transfusions Not Asked    Occupational Exposure Not Asked    Hobby Hazards Not Asked    Sleep Concern Not Asked    Back Care Not Asked    Bike Helmet Not Asked    Self-Exams Not Asked   Social History Narrative    Not on file     Social Drivers of Health     Food Insecurity: No Food Insecurity (3/7/2025)    NCSS - Food Insecurity     Worried About Running Out of Food in the Last Year: No     Ran Out of Food in the Last Year: No   Transportation Needs: No Transportation Needs (3/7/2025)    NCSS - Transportation     Lack of Transportation: No   Stress: Not on file   Housing Stability: Not At Risk (3/7/2025)    NCSS - Housing/Utilities     Has Housing: Yes     Worried About Losing Housing: No     Unable to Get Utilities: No        Review of Systems:  Constitutional: Negative.  HENT: see above  Eyes: Negative.  Respiratory: Negative.  Cardiovascular: Negative.  Gastrointestinal: Negative.  Musculoskeletal: Negative.  Skin: Negative.  Renal: Negative  Endocrine: Negative  Psychiatric/Behavioral: Negative.     Physical Examination:  Vitals: Last menstrual period 03/01/2025, not currently breastfeeding.     General: Breathing comfortably on room air while sitting up. Able to communicate verbally. Voice normal. Normal appearing body habitus.     Musculoskeletal: Head: Atraumatic and normocephalic.     Neck: Full ROM and able to extend without issues     Ears: External auditory canals clear, no cerumen or stenosis; tympanic membranes intact, no retraction or perforation; no middle ear effusion bilaterally    Eyes: Extraocular movements intact and pupils equally reactive to light stimulus. No spontaneous or gaze-evoked nystagmus. No proptosis or ecchymosis. VFI.     Lymphatic: Palpable 1 cm lymph node along left neck level IB, soft, mobile, non-tender; No other significant cervical lymphadenopathy noted.     Neuro: CN 7 intact with symmetric mobility and strength. No loss of facial  sensation.     Skin: Dry, normal turgor, normal color.     Psych: Alert and oriented to person/place/time. Normal affect, amiable     Imaging:  US Neck 12/2024 results reviewed    FINDINGS:    The patient is status post thyroidectomy.  Right thyroid bed:  There is a stable 1.3 x 0.9 x 0.8 cm hypoechoic structure within the right thyroidectomy bed.  Within the right upper lateral neck there is a minimally smaller 1.5 x 0.6 x 1.4 cm lymph node previously measured 1.9 x 0.9 x 1.4 cm which demonstrates a fatty hilum and thin cortex.    Left thyroid bed:  There is a small focal area of granulation tissue noted in the left thyroid bed measuring 0.7 x 0.5 cm unchanged compared to a few of the previous studies.  Within the left submandibular region, there is relatively stable 1.6 x 0.7 x 1 cm lymph node previously measured 1.2 x 0.5 x 1.0 with fatty hilum and thin cortex.                   Impression   CONCLUSION:  Stable exam.       Assessment/Plan:     Left cervical LAD: likely benign, will order US neck to further evaluate.   Left ETD: recommend self-insufflation 10-15 times per day. Can try OTC Flonase and/or antihistamine.        Tavon Calderon MD       [1]   Allergies  Allergen Reactions    Penicillins HIVES

## 2025-03-25 DIAGNOSIS — E53.8 LOW SERUM VITAMIN B12: ICD-10-CM

## 2025-03-25 DIAGNOSIS — E78.5 DYSLIPIDEMIA: ICD-10-CM

## 2025-03-25 DIAGNOSIS — E07.9 THYROID DISEASE: ICD-10-CM

## 2025-03-25 DIAGNOSIS — E11.42 DIABETIC PERIPHERAL NEUROPATHY (HCC): ICD-10-CM

## 2025-03-25 DIAGNOSIS — E11.21 DIABETIC NEPHROPATHY ASSOCIATED WITH TYPE 2 DIABETES MELLITUS (HCC): ICD-10-CM

## 2025-03-25 DIAGNOSIS — C73 THYROID CANCER (HCC): ICD-10-CM

## 2025-03-25 DIAGNOSIS — R74.01 HIGH LIVER TRANSAMINASE LEVEL: ICD-10-CM

## 2025-03-25 DIAGNOSIS — E89.0 HYPOTHYROIDISM, POSTSURGICAL: ICD-10-CM

## 2025-03-25 DIAGNOSIS — E11.65 UNCONTROLLED TYPE 2 DIABETES MELLITUS WITH HYPERGLYCEMIA (HCC): ICD-10-CM

## 2025-03-25 DIAGNOSIS — E89.0 S/P COMPLETE THYROIDECTOMY: ICD-10-CM

## 2025-03-25 DIAGNOSIS — R79.89 LOW VITAMIN D LEVEL: ICD-10-CM

## 2025-03-25 DIAGNOSIS — I10 PRIMARY HYPERTENSION: ICD-10-CM

## 2025-03-25 DIAGNOSIS — E78.2 MIXED HYPERLIPIDEMIA: ICD-10-CM

## 2025-03-25 DIAGNOSIS — R73.09 HIGH GLUCOSE LEVEL: ICD-10-CM

## 2025-03-25 RX ORDER — SEMAGLUTIDE 1.34 MG/ML
1 INJECTION, SOLUTION SUBCUTANEOUS
Qty: 9 ML | Refills: 1 | Status: SHIPPED | OUTPATIENT
Start: 2025-03-25

## 2025-03-25 NOTE — TELEPHONE ENCOUNTER
Endocrine Refill protocol for oral and injectable diabetic medications    Protocol Criteria:  PASSED  Reason: N/A    If all below requirements are met, send a 90-day supply with 1 refill per provider protocol.    Verify appointment with Endocrinology completed in the last 6 months or scheduled in the next 3 months.  Verify A1C has been completed within the last 6 months and is below 8.5%     Last completed office visit: 2/6/2025 Leigh Taylor MD   Next scheduled Follow up:   Future Appointments   Date Time Provider Department Center          5/8/2025 11:15 AM Leigh Taylor MD [facility]      Last A1c result: Last A1c value was 6% done 3/7/2025.

## 2025-03-26 NOTE — TELEPHONE ENCOUNTER
Medication Quantity Refills Start End   OZEMPIC, 1 MG/DOSE, 4 MG/3ML Subcutaneous Solution Pen-injector 9 mL 1 3/25/2025 --   Sig:   INJECT 1MG INTO THE SKIN ONCE A WEEK     Route:   Subcutaneous     Order #:   [id number]         Prior Authorization     KEY: [id number]

## 2025-04-10 ENCOUNTER — HOSPITAL ENCOUNTER (OUTPATIENT)
Dept: ULTRASOUND IMAGING | Age: 45
Discharge: HOME | End: 2025-04-10
Attending: OTOLARYNGOLOGY
Payer: COMMERCIAL

## 2025-04-10 DIAGNOSIS — R59.0 CERVICAL LYMPHADENOPATHY: ICD-10-CM

## 2025-04-10 PROCEDURE — 76536 US EXAM OF HEAD AND NECK: CPT | Performed by: OTOLARYNGOLOGY

## 2025-04-24 ENCOUNTER — OFFICE VISIT (OUTPATIENT)
Facility: LOCATION | Age: 45
End: 2025-04-24
Payer: COMMERCIAL

## 2025-04-24 DIAGNOSIS — S03.00XA DISLOCATION OF TEMPOROMANDIBULAR JOINT, INITIAL ENCOUNTER: ICD-10-CM

## 2025-04-24 DIAGNOSIS — C73 PAPILLARY THYROID CARCINOMA (HCC): Primary | ICD-10-CM

## 2025-04-24 PROCEDURE — 99214 OFFICE O/P EST MOD 30 MIN: CPT | Performed by: OTOLARYNGOLOGY

## 2025-04-24 NOTE — PROGRESS NOTES
Paula Hugo is a 44 year old female.   Chief Complaint   Patient presents with    Throat Problem    Jaw Pain     HPI:   She is postop thyroidectomy for papillary thyroid carcinoma in October 2023.  She then had radioactive iodine.  She has been dealing with dry mouth and xerostomia.  She has had some neck pain on the left-hand side.  She recently had some numbness on the left side of her face and there was concern about TIA.    REVIEW OF SYSTEMS:   GENERAL HEALTH: feels well otherwise  GENERAL : denies fever, chills, sweats, weight loss, weight gain  SKIN: denies any unusual skin lesions or rashes  RESPIRATORY: denies shortness of breath with exertion  NEURO: denies headaches    EXAM:   LMP 03/01/2025     System Findings Details   Constitutional  Overall appearance - Normal.   Psychiatric  Orientation - Oriented to time, place, person & situation. Appropriate mood and affect.   Head/Face  Facial features -- Normal. Skull - Normal.   Eyes  Pupils equal ,round ,react to light and accomidate   Ears, Nose, Throat, Neck  Ears clear nose clear cavity clear pharynx clear neck supple without masses there is some tenderness at the temporomandibular joint bilaterally worse on the left-hand side radiating down towards the neck.   Neurological  Memory - Normal. Cranial nerves - Cranial nerves II through XII grossly intact.   Lymph Detail  Submental. Submandibular. Anterior cervical. Posterior cervical. Supraclavicular.       ASSESSMENT AND PLAN:   1. Papillary thyroid carcinoma (HCC)  Ultrasounds reviewed.  Recent ultrasound of the neck was essentially negative.  She continues to complain of pain at level 1 level 2 on the left-hand side.  I do not see any evidence of recurrence at this time however the patient is quite concerned.  I will do a dedicated CT of the neck and then we will speak after the above.  - CT SOFT TISSUE OF NECK(CONTRAST ONLY) (CPT=70491); Future    2. 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  This could be more of a myofascial pain.  She will follow-up with a dentist who is good at treating TMJ syndrome and Dr. Jennings's name was given.      The patient indicates understanding of these issues and agrees to the plan.    No follow-ups on file.    Justin Can MD  4/24/2025  6:13 PM

## 2025-05-08 ENCOUNTER — HOSPITAL ENCOUNTER (OUTPATIENT)
Dept: CT IMAGING | Facility: HOSPITAL | Age: 45
Discharge: HOME | End: 2025-05-08
Attending: OTOLARYNGOLOGY
Payer: COMMERCIAL

## 2025-05-08 ENCOUNTER — PATIENT MESSAGE (OUTPATIENT)
Facility: LOCATION | Age: 45
End: 2025-05-08

## 2025-05-08 ENCOUNTER — OFFICE VISIT (OUTPATIENT)
Facility: LOCATION | Age: 45
End: 2025-05-08

## 2025-05-08 ENCOUNTER — LAB ENCOUNTER (OUTPATIENT)
Dept: LAB | Age: 45
End: 2025-05-08
Attending: INTERNAL MEDICINE
Payer: COMMERCIAL

## 2025-05-08 VITALS
HEIGHT: 60 IN | DIASTOLIC BLOOD PRESSURE: 64 MMHG | WEIGHT: 157 LBS | HEART RATE: 84 BPM | SYSTOLIC BLOOD PRESSURE: 98 MMHG | BODY MASS INDEX: 30.82 KG/M2

## 2025-05-08 DIAGNOSIS — E11.42 DIABETIC PERIPHERAL NEUROPATHY (HCC): ICD-10-CM

## 2025-05-08 DIAGNOSIS — Z90.89 S/P COMPLETE THYROIDECTOMY: ICD-10-CM

## 2025-05-08 DIAGNOSIS — R73.09 HIGH GLUCOSE LEVEL: ICD-10-CM

## 2025-05-08 DIAGNOSIS — B36.9 FUNGAL SKIN DISEASE: ICD-10-CM

## 2025-05-08 DIAGNOSIS — R79.89 LOW VITAMIN D LEVEL: ICD-10-CM

## 2025-05-08 DIAGNOSIS — E07.9 THYROID DISEASE: ICD-10-CM

## 2025-05-08 DIAGNOSIS — E11.65 UNCONTROLLED TYPE 2 DIABETES MELLITUS WITH HYPERGLYCEMIA (HCC): ICD-10-CM

## 2025-05-08 DIAGNOSIS — E53.8 LOW SERUM VITAMIN B12: ICD-10-CM

## 2025-05-08 DIAGNOSIS — E78.5 DYSLIPIDEMIA: ICD-10-CM

## 2025-05-08 DIAGNOSIS — E78.2 MIXED HYPERLIPIDEMIA: ICD-10-CM

## 2025-05-08 DIAGNOSIS — E11.21 DIABETIC NEPHROPATHY ASSOCIATED WITH TYPE 2 DIABETES MELLITUS (HCC): ICD-10-CM

## 2025-05-08 DIAGNOSIS — C73 PAPILLARY THYROID CARCINOMA (HCC): ICD-10-CM

## 2025-05-08 DIAGNOSIS — C73 THYROID CANCER (HCC): ICD-10-CM

## 2025-05-08 DIAGNOSIS — B36.9 FUNGAL SKIN DISEASE: Primary | ICD-10-CM

## 2025-05-08 DIAGNOSIS — E89.0 HYPOTHYROIDISM, POSTSURGICAL: ICD-10-CM

## 2025-05-08 DIAGNOSIS — E07.9 THYROID DISEASE: Primary | ICD-10-CM

## 2025-05-08 DIAGNOSIS — Z98.890 S/P COMPLETE THYROIDECTOMY: ICD-10-CM

## 2025-05-08 LAB
CREAT BLD-MCNC: <0.35 MG/DL (ref 0.55–1.02)
CREAT UR-SCNC: 83.8 MG/DL
EST. AVERAGE GLUCOSE BLD GHB EST-MCNC: 146 MG/DL (ref 68–126)
HBA1C MFR BLD: 6.7 % (ref ?–5.7)
MICROALBUMIN UR-MCNC: <0.3 MG/DL
T3FREE SERPL-MCNC: 3.45 PG/ML (ref 2.4–4.2)
T4 FREE SERPL-MCNC: 1.5 NG/DL (ref 0.8–1.7)
TSI SER-ACNC: 0.12 UIU/ML (ref 0.55–4.78)

## 2025-05-08 PROCEDURE — 3074F SYST BP LT 130 MM HG: CPT | Performed by: INTERNAL MEDICINE

## 2025-05-08 PROCEDURE — 84439 ASSAY OF FREE THYROXINE: CPT

## 2025-05-08 PROCEDURE — 3008F BODY MASS INDEX DOCD: CPT | Performed by: INTERNAL MEDICINE

## 2025-05-08 PROCEDURE — 3078F DIAST BP <80 MM HG: CPT | Performed by: INTERNAL MEDICINE

## 2025-05-08 PROCEDURE — 3044F HG A1C LEVEL LT 7.0%: CPT | Performed by: INTERNAL MEDICINE

## 2025-05-08 PROCEDURE — 82565 ASSAY OF CREATININE: CPT

## 2025-05-08 PROCEDURE — 82570 ASSAY OF URINE CREATININE: CPT

## 2025-05-08 PROCEDURE — 84481 FREE ASSAY (FT-3): CPT

## 2025-05-08 PROCEDURE — 83036 HEMOGLOBIN GLYCOSYLATED A1C: CPT

## 2025-05-08 PROCEDURE — 99214 OFFICE O/P EST MOD 30 MIN: CPT | Performed by: INTERNAL MEDICINE

## 2025-05-08 PROCEDURE — 84443 ASSAY THYROID STIM HORMONE: CPT

## 2025-05-08 PROCEDURE — 84432 ASSAY OF THYROGLOBULIN: CPT

## 2025-05-08 PROCEDURE — 86800 THYROGLOBULIN ANTIBODY: CPT

## 2025-05-08 PROCEDURE — 36415 COLL VENOUS BLD VENIPUNCTURE: CPT

## 2025-05-08 PROCEDURE — 82043 UR ALBUMIN QUANTITATIVE: CPT

## 2025-05-08 PROCEDURE — 70491 CT SOFT TISSUE NECK W/DYE: CPT | Performed by: OTOLARYNGOLOGY

## 2025-05-08 RX ORDER — NYSTATIN 100000 U/G
CREAM TOPICAL 2 TIMES DAILY
Status: SHIPPED | OUTPATIENT
Start: 2025-05-08

## 2025-05-08 RX ORDER — LEVOTHYROXINE SODIUM 125 MCG
125 TABLET ORAL
Qty: 90 TABLET | Refills: 1 | Status: SHIPPED | OUTPATIENT
Start: 2025-05-08

## 2025-05-08 RX ORDER — ACYCLOVIR 400 MG/1
1 TABLET ORAL
Qty: 3 EACH | Refills: 11 | Status: SHIPPED | OUTPATIENT
Start: 2025-05-08

## 2025-05-08 RX ORDER — PROCHLORPERAZINE 25 MG/1
1 SUPPOSITORY RECTAL AS DIRECTED
COMMUNITY
Start: 2025-04-09

## 2025-05-08 RX ORDER — PROCHLORPERAZINE 25 MG/1
1 SUPPOSITORY RECTAL
Qty: 9 EACH | Refills: 1 | Status: SHIPPED | OUTPATIENT
Start: 2025-05-08 | End: 2025-11-04

## 2025-05-08 NOTE — PROGRESS NOTES
Reason for Visit:  thyroid cancer, hypothyroid, DM, obese, DLP, statin intolerance  Requesting Physician:   ..HARSH MALHOTRA      CHIEF COMPLAINT:    Chief Complaint   Patient presents with    Diabetes     F/u    Thyroid Problem     F/u        HISTORY OF PRESENT ILLNESS:   Paula Hugo is a 44 year old female who presents with thyroid cancer, PTC stage I, hypothyroid, DM, obese, DLP, statin intolerance and low vit D    Justin Marks MD ENT. Reviewed her CT.   We discussed with Dr Can. Tg levels are high so will proceed with US guided FNA to thyroid bed lesions to get more result.     Now on Synthroid 125 mcg /day as rec'   Feels better on brand name.  She might need a PA for Synthroid. Did not tolerate generic LT4      She is on 125 mcg/day . She reports hyperthyroid symptoms from 137 mcg but now on 125 she feels hypothyroid symptoms    She has compressive symptoms   Last period was ~ 2 mo ago       She is on MTF, farxiga, and  ozempic    She is on vit  D3, B12 and iron over        We discussed labs and imaging studies        Thyroid cancer history   9/11/2023 Right mid thyroid nodule, FNA - PTC ([facility])  10/26/2023 Bilateral Total Thyroidectomy  by Justin Can MD      12/2023 US   . There is soft tissue mass in area of right thyroidectomy which has suspicious imaging appearance on ultrasound and should be further evaluated with CT or I 131 imaging study.   . Findings in left neck in thyroid region are probably granulation tissue but nonspecific.   . Mildly enlarged left submandibular and node is noted.  This does maintain normal lymph node architecture although should be correlated clinically.     12/8/2023 TSH >100, Tg 143, neg TgAb  12/26/023   on 50 mcg Synthroid  1/11/2024 TSH 3.1  1/16/2024 TSH 1.39 125 mcg Synthroid    12/2023 US post op month #2 when her TSH was > 100 which showed possible Rt thyroid bed residual.   2/2024 TSH 0.059  FT41.7  Tg 8.9 TgAb <1.0   3/28/2024 US  right thyroid bed is a hypoechoic, 0.9 x 0.9 x 1.6 versus 1 x 1.1 x 1.7 cm nodule.  Within the left thyroid bed best seen on trans imaging is a stable 4 x 5 versus 4 x 6 mm echogenic area.   4/2/2024 TSH 0.8 , Tg 13.9, TgAb <1   6/10/2024  CURRY treatment 106.9 mCi I-131   6/17/2024 Post tx scan There is radiotracer accumulation within the thyroid bed bilaterally which could reflect recurrent or residual neoplasm and/or residual thyroid tissue.  No definitive distant metastatic disease.   7/30/2024 TSH 0.5, FT4 1.4, Tg 18, TgAb<1  8/8/2024 rt thyroid bed residual is smaller now   8/2024 head/neck US showed  1.3 x 0.9 x 0.8 cm hypoechoic structure within the right thyroidectomy bed, stable to minimally decreased in size from prior.   Within the right upper lateral neck there is a 1.9 x 0.9 x 1.4 cm lymph node which demonstrates a fatty hilum and thin cortex. Within the right submandibular region there is a 1.6 x 0.6 x 1.3 cm lymph node with fatty hilum and thin cortex.  Within the   left submandibular region there is a 1.2 x 0.5 x 1.0 cm lymph node with fatty hilum and thin cortex.   The previously identified lesion in the left thyroidectomy bed is not identified on the current exam.  Attention on follow-up is recommended.     12/2024 US 1.3 x 0.9 x 0.8 cm hypoechoic structure within the right thyroidectomy bed.  0.7 x 0.5 cm unchanged compared to a few of the previous studies.   Within the left submandibular region,   7/2024 TSH 0.5 Tg 18.8 w/ neg TgAb  9/2024 TSH 0.5 Tg 12.0 w/ neg TgAb  12/5/2024 TSH 0.4 , Tg 5 on 125 mcg Synthroid      2/6/2025 TSH 0.6 , Tg 21 on 125 mcg Synthroid    4/2025 US No abnormality demonstrated in targeted neck ultrasound.    5/8/2025 CT 17 x 12 mm nodule along the midline inferior margin of the expected thyroidectomy bed , and a smaller nodule on the right.  No enlarged lymph nodes or nodules outside of these regions.       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  -Tumor is confined to the thyroid parenchyma.  -Margins of resection are free of tumor.  -Single right perithyroidal lymph node, negative for metastatic carcinoma.           SPECIMEN   Procedure  Total thyroidectomy   TUMOR   Tumor Focality  Unifocal   Tumor Characteristics     Tumor Site  Right lobe   Tumor Size  Greatest Dimension (Centimeters): 2.5 cm   Histologic Tumor Types and Subtypes  Papillary thyroid carcinoma   Tumor Proliferative Activity     Mitotic Rate  Less than 3 mitoses per 2mm2   Tumor Necrosis  Not identified   Angioinvasion (vascular invasion)  Not identified   Lymphatic Invasion  Not identified   Perineural Invasion  Not identified   Extrathyroidal Extension  Not identified   Margin Status  All margins negative for carcinoma   Distance from Invasive Carcinoma to Closest Margin  Less than 1 mm   REGIONAL LYMPH NODES   Regional Lymph Node Status  All regional lymph nodes negative for tumor   Number of Lymph Nodes Examined  1   Matt Level(s) Examined  Level VI   pTNM CLASSIFICATION (AJCC 8th Edition)   Reporting of pT, pN, and (when applicable) pM categories is based on information available to the pathologist at the time the report is issued. As per the AJCC (Chapter 1, 8th Ed.) it is the managing physician’s responsibility to establish the final pathologic stage based upon all pertinent information, including but potentially not limited to this pathology report.        pT Category  pT2   pN Category  pN0a   ADDITIONAL FINDINGS   Additional Findings  None identified     DM   On metformin, farxiga, ozempic 1 mg/week     Lab Results   Component Value Date    A1C 6.0 (H) 03/07/2025    A1C 6.3 (A) 12/05/2024    A1C 6.0 (H) 07/30/2024    A1C 7.9 (H) 10/28/2023    A1C 7.5 (H) 12/17/2019      Micro Albumen/Creatinine:    Lab Results   Component Value Date    MICROALBCREA 9.8 07/30/2024           DLP On zetia   She did not  tolerate statin- forgetfulness. Does not want to try them again.               ASSESSMENT AND PLAN:  44 year old woman with PTC stage I T2 N0 M0, s/p surgery 10/2023 and  CURRY 106 6/2024 and post tx scan no distant mets.  post op hypothyroid, DM, obese, DLP, statin intolerance, low b12 and low vit D    10/2023 pathology showed 2.5 cm PTC Rt side, no ETE/vascular/lymphatic/perineural invasion, negative margins, 0/1 LN VI,      Her initial recurrence risk per PRAKASH was ~ 5% based on pathology alone. Her post op w/u showed high Tg levels ( stimulated 143 and non-stimulated 8.9)    3/2024 US thyroid also showed some possible BL thyroid bed residual.      6/10/2024  CURRY treatment 106.9 mCi I-131   6/17/2024 Post tx scan There is radiotracer accumulation within the thyroid bed bilaterally which could reflect recurrent or residual neoplasm and/or residual thyroid tissue.  No definitive distant metastatic disease.        12/2024 US 1.3 x 0.9 x 0.8 cm hypoechoic structure within the right thyroidectomy bed.  0.7 x 0.5 cm unchanged compared to a few of the previous studies.       2/6/2025 TSH 0.6 , Tg 21 on 125 mcg Synthroid       4/2025 US No abnormality demonstrated in targeted neck ultrasound.    5/8/2025 CT 17 x 12 mm nodule along the midline inferior margin of the expected thyroidectomy bed , and a smaller nodule on the right.  No enlarged lymph nodes or nodules outside of these regions.           Clinically, nonspecific symptoms. We discussed again. Her Tg levels are high. US and CT showed possible thyroid bed residual. Will refer to get FNA and follow up with surgery           Plan   Might need a PA for Synthroid brand name. She did not tolerate generic levothyroxine   Labs today   Will refer to get FNA from thyroid bed lesions on CT scan   Follow up with ENT     In 3 mo  labs and follow up   Follow up with psych  US in 10/2025     Will give nystatin cream and she will follow up with PCP     Ozempic 1 mg weekly  Metformin    Farxiga- ok to stop if getting dry mouth  Zetia    B12 -over the counter   vit D daily - D3 1000 unit/day    Low iron. on iron. Take  stool softener  if you have constipation      Thyroid medication dose: Synthroid 125 mcg /day. Might increase to 137 after labs today   Take you medication on empty stomach, not with any other medication or food. Wait 60 minutes before eating. Wait 4 hours before taking Vitamins, Calcium or iron. On the morning of the lab test, please take the medication after the blood test not before. Do not take Biotin 1 week before blood test.      Did not tolerate atorvastatin and does not want to try them again       PAST MEDICAL HISTORY:   Past Medical History:    Diabetes (HCC)    Disorder of thyroid    High blood pressure    High cholesterol    History of COVID-19    Had flu like S/S x 2-3 days.    Hypothyroidism    Neuropathy    feet    PONV (postoperative nausea and vomiting)    Thyroid cancer (HCC)    Visual impairment    glasses       PAST SURGICAL HISTORY:   Past Surgical History:   Procedure Laterality Date      2002      06/15/2005      2007      10/17/2013    Thyroidectomy     10/2023 thyroidectomy      CURRENT MEDICATIONS:    Current Outpatient Medications   Medication Sig Dispense Refill    Continuous Glucose Transmitter (DEXCOM G6 TRANSMITTER) Does not apply Misc Take 1 Bottle by mouth As Directed.      Continuous Glucose Sensor (DEXCOM G7 SENSOR) Does not apply Misc 1 each Every 10 days. Use as directed every 10 days 3 each 11    Continuous Glucose Sensor (DEXCOM G6 SENSOR) Does not apply Misc 1 each Every 10 days. Use as directed every 10 days 9 each 1    SYNTHROID 125 MCG Oral Tab Take 1 tablet (125 mcg total) by mouth before breakfast. Synthroid brand name only 90 tablet 1    OZEMPIC, 1 MG/DOSE, 4 MG/3ML Subcutaneous Solution Pen-injector INJECT 1MG INTO THE SKIN ONCE A WEEK 9 mL 1    METFORMIN  MG Oral Tablet 24 Hr TAKE 1  TABLET (500 MG TOTAL) BY MOUTH 2 (TWO) TIMES DAILY WITH MEALS. CAN HOLD UNTIL DIET IMPROVES 180 tablet 0    atorvastatin 40 MG Oral Tab Take 1 tablet (40 mg total) by mouth nightly. 30 tablet 0    aspirin 81 MG Oral Tab EC Take 1 tablet (81 mg total) by mouth daily. 30 tablet 0    ergocalciferol 1.25 MG (52907 UT) Oral Cap Take 1 capsule (50,000 Units total) by mouth twice a week.      Glucose Blood (ACCU-CHEK GUIDE TEST) In Vitro Strip Use to check blood glucose twice daily. Dx E11.65. 200 each 1    Accu-Chek Softclix Lancets Does not apply Misc Use to check blood glucose twice daily. Dx E11.65. 200 each 1    FARXIGA 10 MG Oral Tab Take 1 tablet (10 mg total) by mouth daily. Can hold until diet improves (Patient taking differently: Take 5 mg by mouth daily. Can hold until diet improves) 30 tablet 5     No medication comments found.   Continuous Glucose Transmitter (DEXCOM G6 TRANSMITTER) Does not apply Misc Take 1 Bottle by mouth As Directed.      Continuous Glucose Sensor (DEXCOM G7 SENSOR) Does not apply Misc 1 each Every 10 days. Use as directed every 10 days 3 each 11    Continuous Glucose Sensor (DEXCOM G6 SENSOR) Does not apply Misc 1 each Every 10 days. Use as directed every 10 days 9 each 1    SYNTHROID 125 MCG Oral Tab Take 1 tablet (125 mcg total) by mouth before breakfast. Synthroid brand name only 90 tablet 1    OZEMPIC, 1 MG/DOSE, 4 MG/3ML Subcutaneous Solution Pen-injector INJECT 1MG INTO THE SKIN ONCE A WEEK 9 mL 1    METFORMIN  MG Oral Tablet 24 Hr TAKE 1 TABLET (500 MG TOTAL) BY MOUTH 2 (TWO) TIMES DAILY WITH MEALS. CAN HOLD UNTIL DIET IMPROVES 180 tablet 0      nystatin (Mycostatin) 100,000 Units/g cream   Topical BID             ALLERGIES:  Allergies   Allergen Reactions    Penicillins HIVES       SOCIAL HISTORY:    Social History     Socioeconomic History    Marital status:    Tobacco Use    Smoking status: Never    Smokeless tobacco: Never   Vaping Use    Vaping status: Never Used    Substance and Sexual Activity    Alcohol use: Never    Drug use: Never   Other Topics Concern    Caffeine Concern Yes     Comment: 1-2 cups a day    Special Diet Yes     Comment: 2-3 x week       FAMILY HISTORY:   Family History   Problem Relation Age of Onset    Diabetes Mother              PHYSICAL EXAM:   Height: 5' (152.4 cm) (05/08 1100)  Weight: 157 lb (71.2 kg) (05/08 1100)  BSA (Calculated - sq m): 1.68 sq meters (05/08 1100)  Pulse: 84 (05/08 1100)  BP: 98/64 (05/08 1100)  Temp: --  Do Not Use - Resp Rate: --  SpO2: --    Body mass index is 30.66 kg/m².  Wt Readings from Last 6 Encounters:   05/08/25 157 lb (71.2 kg)   03/07/25 163 lb 9.3 oz (74.2 kg)   02/06/25 162 lb (73.5 kg)   12/05/24 160 lb (72.6 kg)   09/24/24 157 lb (71.2 kg)   08/20/24 156 lb 9.6 oz (71 kg)          Well healed wound at the base of the neck   No LAP on exam       Neck/Thyroid: neck inspection:  + scar,       DATA:   Pertinent data reviewed   04/10/25 13:53   US HEAD/NECK (CPT=76536) No abnormality demonstrated in targeted neck ultrasound.  Further management of left neck discomfort should be based on clinical grounds    Rpt: View report in Results Review for more information   12/27/24 10:55   US HEAD/NECK (CPT=76536) Rpt   Rpt: View report in Results Review for more information stable 1.3 x 0.9 x 0.8 cm hypoechoic structure within the right thyroidectomy bed.   Within the right upper lateral neck there is a minimally smaller 1.5 x 0.6 x 1.4 cm lymph node previously measured 1.9 x 0.9 x 1.4 cm which demonstrates a fatty hilum and thin cortex.    Left thyroid bed:  There is a small focal area of granulation tissue noted in the left thyroid bed measuring 0.7 x 0.5 cm unchanged compared to a few of the previous studies.    Latest Reference Range & Units 12/05/24 11:39   T4,Free (Direct) 0.8 - 1.7 ng/dL 1.5   TSH 0.550 - 4.780 uIU/mL 0.440 (L)   T3 FREE 2.40 - 4.20 pg/mL 2.98   Thyroglob Ab 0.0 - 0.9 IU/mL <1.0   Thyroglobulin, LIA  1.5 - 38.5 ng/mL 8.2   (L): Data is abnormally low       No results for input(s): \"TSH\", \"T4F\", \"T3F\", \"THYP\" in the last 72 hours.      CT SOFT TISSUE OF NECK(CONTRAST ONLY) (CPT=70491)  Result Date: 5/8/2025  CONCLUSION:  17 x 12 mm nodule along the midline inferior margin of the expected thyroidectomy bed , and a smaller nodule on the right.  No enlarged lymph nodes or nodules outside of these regions.  The patient has had multiple previous neck ultrasound examinations where nodules were found, and suggest that these areas be followed up with comparison neck ultrasound.   LOCATION:  [facility]    Dictated by (CST): Jose Escobar MD on 5/08/2025 at 8:10 AM     Finalized by (CST): Jose Escobar MD on 5/08/2025 at 8:15 AM         CT SOFT TISSUE OF NECK(CONTRAST ONLY) (CPT=70491)          PROCEDURE:  CT SOFT TISSUE OF NECK(CONTRAST ONLY) (CPT=70491)     COMPARISON:  [facility] , CT, CT STROKE CTA BRAIN/CTA NECK (W IV)(CPT=70496/04902), 3/07/2025, 2:35 PM.     INDICATIONS:  C73 Papillary thyroid carcinoma (HCC), status post thyroidectomy     TECHNIQUE:  IV contrast-enhanced multislice CT scanning is performed through the neck soft tissues during administration of nonionic contrast.  Dose reduction techniques were used. Dose information is transmitted to the ACR (American College of   Radiology) NRDR (National Radiology Data Registry) which includes the Dose Index Registry.     PATIENT STATED HISTORY:(As transcribed by Technologist)  Papillary thyroid carcinoma. Neck pain      CONTRAST USED:  50cc of Isovue 370     FINDINGS:       In the midline neck anterior to the trachea axial image 77 there is a 17 x 12 mm nodule transverse AP dimensions respectively soft tissue with some small amount of central calcification.  This is along the inferior margin of the expected location of the   thyroidectomy bed.  On image 72, a soft tissue density 6 mm in the expected right thyroidectomy bed.     Scattered normal size neck lymph nodes  bilaterally.  No airway lesion, occlusion.  The major salivary glands appear normal.  Trachea shows no lesion.  No bone pathology.                   =====  CONCLUSION:  17 x 12 mm nodule along the midline inferior margin of the expected thyroidectomy bed , and a smaller nodule on the right.  No enlarged lymph nodes or nodules outside of these regions.  The patient has had multiple previous neck ultrasound   examinations where nodules were found, and suggest that these areas be followed up with comparison neck ultrasound.        LOCATION:  [facility]          Dictated by (CST): Jose Escobar MD on 5/08/2025 at 8:10 AM       Finalized by (CST): Jose Escobar MD on 5/08/2025 at 8:15 AM            POCT CREATININE        Component Value Flag Ref Range Units Status    ISTAT Creatinine <0.35      0.55 - 1.02 mg/dL Final    Comment:    This test may exhibit falsely elevated results when a sample is collected from a patient who is presently taking Hydroxyurea. If patient is consuming Hydroxyurea, i-STAT creatinine analysis should be considered inaccurate and a sample should be referred to the Central Lab for analysis, if clinically indicated.    eGFR-Cr         Final    Comment:    eGFR calculated using the CKD-EPI 2021 calculation                        Orders Placed This Encounter   Procedures    TSH W Reflex To Free T4    TSH W Reflex To Free T4    Thyroglobulin Antibody and Tumor Marker    Microalb/Creat Ratio, Random Urine    Hemoglobin A1C     Orders Placed This Encounter    TSH W Reflex To Free T4     Standing Status:   Future     Expected Date:   8/8/2025     Expiration Date:   5/8/2026     Release to patient:   Immediate    TSH W Reflex To Free T4     Standing Status:   Future     Number of Occurrences:   1     Expected Date:   5/8/2025     Expiration Date:   5/8/2026     Release to patient:   Immediate    Thyroglobulin Antibody and Tumor Marker     Standing Status:   Future     Number of Occurrences:   1      Expected Date:   2025     Expiration Date:   2026     Release to patient:   Immediate    Microalb/Creat Ratio, Random Urine     Standing Status:   Future     Number of Occurrences:   1     Expected Date:   2025     Expiration Date:   2026     Release to patient:   Immediate    Hemoglobin A1C     Standing Status:   Future     Number of Occurrences:   1     Expected Date:   2025     Expiration Date:   2026     Release to patient:   Immediate    Continuous Glucose Transmitter (DEXCOM G6 TRANSMITTER) Does not apply Misc     Sig: Take 1 Bottle by mouth As Directed.    Continuous Glucose Sensor (DEXCOM G7 SENSOR) Does not apply Misc     Si each Every 10 days. Use as directed every 10 days     Dispense:  3 each     Refill:  11    Continuous Glucose Sensor (DEXCOM G6 SENSOR) Does not apply Misc     Si each Every 10 days. Use as directed every 10 days     Dispense:  9 each     Refill:  1    SYNTHROID 125 MCG Oral Tab     Sig: Take 1 tablet (125 mcg total) by mouth before breakfast. Synthroid brand name only     Dispense:  90 tablet     Refill:  1     Please do not substitute for generic.    nystatin (Mycostatin) 100,000 Units/g cream     Please indicate site of application:   Abdomen          This is a specialized patient consultation in endocrinology and required comprehensive review of prior records, as well as current evaluation, with time required for consideration of complex endocrine issues and consultation. For this visit, I personally interviewed the patient, and family member if accompanied, performed the pertinent parts of the history and physical examination. ROS included screening for appropriate endocrine conditions.   Today's diagnosis and plan were reviewed in detail with the patient who states understanding and agrees with plan. I discussed with the patient possible diagnosis, differential diagnosis, need for work up , treatment options, alternatives and side effects.      Please see note for details about time spent which includes:   · pre-visit preparation  · reviewing records  · face to face time with the patient   · timely documentation of the encounter  · ordering medications/tests  · communication with care team  · care coordination    I appreciate the opportunity to be part of your patient's medical care and will keep you, as the referring and primary physicians, informed about the care of your patient, including possible future surgery and pathology findings. Please feel free to contact me should you have any questions.      Leigh Taylor MD

## 2025-05-08 NOTE — PATIENT INSTRUCTIONS
Labs today   Will refer to get FNA from thyroid bed lesions on CT scan   Follow up with ENT     In 3 mo  labs and follow up   Follow up with psych  US in 10/2025          Ozempic 1 mg weekly  Metformin   Farxiga- ok to stop if getting dry mouth  Zetia    B12 -over the counter   vit D daily - D3 1000 unit/day    Low iron. on iron. Take  stool softener  if you have constipation      Thyroid medication dose: Synthroid 125 mcg /day. Might increase to 137 after labs today   Take you medication on empty stomach, not with any other medication or food. Wait 60 minutes before eating. Wait 4 hours before taking Vitamins, Calcium or iron. On the morning of the lab test, please take the medication after the blood test not before. Do not take Biotin 1 week before blood test.      Did not tolerate atorvastatin and does not want to try them again

## 2025-05-09 LAB
THYROGLOB AB: <1 IU/ML
THYROGLOB IMA: 10.2 NG/ML

## 2025-05-19 DIAGNOSIS — E11.21 DIABETIC NEPHROPATHY ASSOCIATED WITH TYPE 2 DIABETES MELLITUS (HCC): ICD-10-CM

## 2025-05-19 DIAGNOSIS — E53.8 LOW SERUM VITAMIN B12: ICD-10-CM

## 2025-05-19 DIAGNOSIS — E11.42 DIABETIC PERIPHERAL NEUROPATHY (HCC): ICD-10-CM

## 2025-05-19 DIAGNOSIS — R74.01 HIGH LIVER TRANSAMINASE LEVEL: ICD-10-CM

## 2025-05-19 DIAGNOSIS — E78.2 MIXED HYPERLIPIDEMIA: ICD-10-CM

## 2025-05-19 DIAGNOSIS — Z98.890 S/P COMPLETE THYROIDECTOMY: ICD-10-CM

## 2025-05-19 DIAGNOSIS — I10 PRIMARY HYPERTENSION: ICD-10-CM

## 2025-05-19 DIAGNOSIS — E07.9 THYROID DISEASE: ICD-10-CM

## 2025-05-19 DIAGNOSIS — E89.0 HYPOTHYROIDISM, POSTSURGICAL: ICD-10-CM

## 2025-05-19 DIAGNOSIS — Z90.89 S/P COMPLETE THYROIDECTOMY: ICD-10-CM

## 2025-05-19 DIAGNOSIS — R79.89 LOW VITAMIN D LEVEL: ICD-10-CM

## 2025-05-19 DIAGNOSIS — R73.09 HIGH GLUCOSE LEVEL: ICD-10-CM

## 2025-05-19 DIAGNOSIS — E11.65 UNCONTROLLED TYPE 2 DIABETES MELLITUS WITH HYPERGLYCEMIA (HCC): ICD-10-CM

## 2025-05-19 DIAGNOSIS — C73 THYROID CANCER (HCC): ICD-10-CM

## 2025-05-19 DIAGNOSIS — E78.5 DYSLIPIDEMIA: ICD-10-CM

## 2025-05-19 NOTE — TELEPHONE ENCOUNTER
Medication Quantity Refills Start End   METFORMIN  MG Oral Tablet 24 Hr 180 tablet 0 1/30/2025 --   Sig:   TAKE 1 TABLET (500 MG TOTAL) BY MOUTH 2 (TWO) TIMES DAILY WITH MEALS. CAN HOLD UNTIL DIET IMPROVES     Route:   Oral       Refill requested

## 2025-05-20 RX ORDER — NYSTATIN 100000 U/G
CREAM TOPICAL
Qty: 30 G | Refills: 0 | Status: SHIPPED | OUTPATIENT
Start: 2025-05-20

## 2025-05-20 RX ORDER — METFORMIN HYDROCHLORIDE 500 MG/1
500 TABLET, EXTENDED RELEASE ORAL 2 TIMES DAILY WITH MEALS
Qty: 180 TABLET | Refills: 1 | Status: SHIPPED | OUTPATIENT
Start: 2025-05-20

## 2025-05-20 NOTE — TELEPHONE ENCOUNTER
Please submit appeal   She has thyroid cancer and did not tolerate generic levothyroxine   Please update the pt. Also can check mail order or [facility] for Synthyroid. Do we have saving cards?   Please send Synthyroid Rx same doses to the preferred [facility] pharmacy or mail order pharmacy if cheaper.

## 2025-05-20 NOTE — TELEPHONE ENCOUNTER
Called [employer] to find out reason for PA denial. Spoke with Tiffany. No current active coverage for pt.    Called [employer]. Spoke with automated system. PA Case: [id number] denied. No details available. Unable to speak to a human.    RN attempted to resubmit PA to get appeal info, and received this message:  PA has already submitted and is in process for this patient and drug.;CaseId:[id number];Status:In Process    Called [facility] again. Hours are 8a-5p, M-F. Unable to verify benefits or speak to rep.

## 2025-05-20 NOTE — TELEPHONE ENCOUNTER
Dr. Taylor,  Please advise. Synthroid PA denied by insurance. Nystatin was ordered as clinic-administered med, so new rx pended. Please advise. Thanks!

## 2025-05-21 NOTE — TELEPHONE ENCOUNTER
Called [employer] Pharmacy Benefit manager:      - Provided case ID [id number]. Confirmed coverage and denial status via automated system. Requested transfer to live representative to discuss.      - Spoke with RENUKA Augustin representative. no call ref# available.  Confirmed PA has been denied but there is currently an open appeal for Synthroid under the same case number pending additional clinical questions.     - Transferred to the appeals department. Spoke with Chio. No call reference number provided. Provided patient, provider, provider location, updated fax information. Answered required clinical question over the phone. They wanted to know if the patient has an allergy to dye or fillers in the generic levothyroxine pill that could cause a life threatening allergic reaction. Answered no. Provided information that the patient has thyroid cancer and has tried and failed generic due to worsening symptoms. Currently maintained on Synthroid and feeling well. Chio advised that we please fax pertinent clinical information and a letter of medical necessity to: Fax: [phone] Case iD: [id number], include patient's zip code. Requested appeal to be marked as expedited since the patient is almost out of her medication. Should receive appeal decision within 72 hrs.     Total time on phone: 33 minutes.     Letter of medical necessity generated. Faxed to appeals with the number listed above along with Last office visit note.     Called the patient to update and provide options for obtaining temporary supply of Synthroid or levothyroxine while appeal is in process over the next 3 days. Left message to call back. Update via [employer] sent as well.

## 2025-05-23 ENCOUNTER — TELEPHONE (OUTPATIENT)
Dept: ENDOCRINOLOGY CLINIC | Facility: CLINIC | Age: 45
End: 2025-05-23

## 2025-05-23 NOTE — TELEPHONE ENCOUNTER
Received fax from [facility] in regards to Synthyroid 125mcg. Medication has been denied due to the following:    Coverage us provided in situations where the brand product is being requested due to a formulation difference in the inactive ingredients (difference in dyes, fillers, preservatives) between the brand and the bioequivalent generic product which per the prescriber would result in a significant allergy or serious adverse reaction.     Coverage cannot be authorized at this time.    Placed in denial folder.

## 2025-05-23 NOTE — TELEPHONE ENCOUNTER
Patient is requesting to speak with an RN.  Patient is calling to discuss updating Medical Necessity Letter.  Please call

## 2025-05-23 NOTE — TELEPHONE ENCOUNTER
Spoke to patient - see MC messages with patient's adverse reaction to levothyroxine   Patient stated she spoke to insurance and they will cover the synthroid if it is documented that she has reaction - patient stated she was told that she will hear about decision by 10am tomorrow (she is going out to town next week)    Expedited PA done via [employer] for Synthroid:  Case Id  [id number]  Reference Id  [id number]  Priority  Priority: Expedited    Patient stated pharmacy will give her temporary supply if PA is not resolved they patient can receive a temporary supply

## 2025-06-03 RX ORDER — LEVOTHYROXINE SODIUM 125 UG/1
125 TABLET ORAL
Qty: 90 TABLET | Refills: 1 | Status: SHIPPED | OUTPATIENT
Start: 2025-06-03

## 2025-06-03 NOTE — TELEPHONE ENCOUNTER
Dr. Taylor,  Pt's insurance won't cover Synthroid, and pt says she doesn't tolerate generic. 2 Pas have been denied, and an appeal is pending. Is it possible to switch to Unithroid? Please advise.       sent to inform pt.    Spoke with Horacio at [facility] who confirmed PA denial of case [id number]. Transferred to Appeals Dept. Spoke with Risa and initiated appeal. Supporting documents faxed to Clinical Appeals at [phone]. This office will receive a response via fax by 6/19/25.    Checked [employer] for PA status. PA denied.

## 2025-06-05 ENCOUNTER — MED REC SCAN ONLY (OUTPATIENT)
Dept: ENDOCRINOLOGY CLINIC | Facility: CLINIC | Age: 45
End: 2025-06-05

## 2025-06-17 ENCOUNTER — TELEPHONE (OUTPATIENT)
Dept: ENDOCRINOLOGY CLINIC | Facility: CLINIC | Age: 45
End: 2025-06-17

## 2025-06-17 NOTE — TELEPHONE ENCOUNTER
ePA for dexcom G6 sensors via epic has failed. Please submit via CovermyMeds.     Closed    Close reason: Other  Payer: [employer]    [phone]  Note from payer: NDC not found in our database, please resubmit with valid NDC - Prescriber details have been updated to match the prescriber directory.

## 2025-07-01 ENCOUNTER — TELEPHONE (OUTPATIENT)
Facility: LOCATION | Age: 45
End: 2025-07-01

## 2025-07-01 NOTE — TELEPHONE ENCOUNTER
Patient requesting a prescription for diazepam as she has gotten it in the past prior to procedures due to her anxiety.

## 2025-07-02 ENCOUNTER — HOSPITAL ENCOUNTER (OUTPATIENT)
Dept: ULTRASOUND IMAGING | Facility: HOSPITAL | Age: 45
Discharge: HOME | End: 2025-07-02
Attending: INTERNAL MEDICINE
Payer: COMMERCIAL

## 2025-07-02 DIAGNOSIS — E07.9 THYROID DISEASE: ICD-10-CM

## 2025-07-02 DIAGNOSIS — C73 THYROID CANCER (HCC): ICD-10-CM

## 2025-07-02 PROCEDURE — 10005 FNA BX W/US GDN 1ST LES: CPT | Performed by: INTERNAL MEDICINE

## 2025-07-02 PROCEDURE — 88173 CYTOPATH EVAL FNA REPORT: CPT | Performed by: INTERNAL MEDICINE

## 2025-07-07 ENCOUNTER — PATIENT MESSAGE (OUTPATIENT)
Facility: LOCATION | Age: 45
End: 2025-07-07

## 2025-07-07 DIAGNOSIS — C73 THYROID CANCER (HCC): Primary | ICD-10-CM

## 2025-07-17 NOTE — TELEPHONE ENCOUNTER
Called [facility] [employer] at [phone], spoke with states Susy patient filled prescription on 7/3/25, 9 for 90 days. Patient may refill prescription in 68 days. Too soon to refill now.    Case ID# [id number]    Call time 42:54    Message to Endocrinology

## 2025-07-17 NOTE — TELEPHONE ENCOUNTER
Called pt to schedule a sooner appt. Pt stated she would like you to order CT guided biopsy , that is what the person who preformed her other biopsy recommended since they had difficulty seeing anything.    Please advise. Pt does have a sooner appt 7/26

## 2025-07-26 ENCOUNTER — OFFICE VISIT (OUTPATIENT)
Dept: ENDOCRINOLOGY CLINIC | Facility: CLINIC | Age: 45
End: 2025-07-26

## 2025-07-26 VITALS
DIASTOLIC BLOOD PRESSURE: 79 MMHG | HEART RATE: 82 BPM | WEIGHT: 157 LBS | HEIGHT: 60 IN | BODY MASS INDEX: 30.82 KG/M2 | SYSTOLIC BLOOD PRESSURE: 104 MMHG

## 2025-07-26 DIAGNOSIS — I10 PRIMARY HYPERTENSION: ICD-10-CM

## 2025-07-26 DIAGNOSIS — E89.0 HYPOTHYROIDISM, POSTSURGICAL: ICD-10-CM

## 2025-07-26 DIAGNOSIS — E11.65 UNCONTROLLED TYPE 2 DIABETES MELLITUS WITH HYPERGLYCEMIA (HCC): ICD-10-CM

## 2025-07-26 DIAGNOSIS — C73 THYROID CANCER (HCC): ICD-10-CM

## 2025-07-26 DIAGNOSIS — Z90.89 S/P COMPLETE THYROIDECTOMY: ICD-10-CM

## 2025-07-26 DIAGNOSIS — E11.21 DIABETIC NEPHROPATHY ASSOCIATED WITH TYPE 2 DIABETES MELLITUS (HCC): Primary | ICD-10-CM

## 2025-07-26 DIAGNOSIS — E07.9 THYROID DISEASE: ICD-10-CM

## 2025-07-26 DIAGNOSIS — Z98.890 S/P COMPLETE THYROIDECTOMY: ICD-10-CM

## 2025-07-26 DIAGNOSIS — R79.89 LOW VITAMIN D LEVEL: ICD-10-CM

## 2025-07-26 DIAGNOSIS — E11.42 DIABETIC PERIPHERAL NEUROPATHY (HCC): ICD-10-CM

## 2025-07-26 DIAGNOSIS — E78.2 MIXED HYPERLIPIDEMIA: ICD-10-CM

## 2025-07-26 LAB
GLUCOSE BLOOD: 120
HEMOGLOBIN A1C: 6.2 % (ref 4.3–5.6)
TEST STRIP LOT #: NORMAL NUMERIC

## 2025-07-26 PROCEDURE — 3078F DIAST BP <80 MM HG: CPT | Performed by: INTERNAL MEDICINE

## 2025-07-26 PROCEDURE — 99214 OFFICE O/P EST MOD 30 MIN: CPT | Performed by: INTERNAL MEDICINE

## 2025-07-26 PROCEDURE — 3061F NEG MICROALBUMINURIA REV: CPT | Performed by: INTERNAL MEDICINE

## 2025-07-26 PROCEDURE — 3074F SYST BP LT 130 MM HG: CPT | Performed by: INTERNAL MEDICINE

## 2025-07-26 PROCEDURE — 83036 HEMOGLOBIN GLYCOSYLATED A1C: CPT | Performed by: INTERNAL MEDICINE

## 2025-07-26 PROCEDURE — 3008F BODY MASS INDEX DOCD: CPT | Performed by: INTERNAL MEDICINE

## 2025-07-26 PROCEDURE — 82947 ASSAY GLUCOSE BLOOD QUANT: CPT | Performed by: INTERNAL MEDICINE

## 2025-07-26 PROCEDURE — 3044F HG A1C LEVEL LT 7.0%: CPT | Performed by: INTERNAL MEDICINE

## 2025-07-26 NOTE — PROGRESS NOTES
Reason for Visit:  thyroid cancer, hypothyroid, DM, obese, DLP, statin intolerance  Requesting Physician:   ..HARSH MALHOTRA      CHIEF COMPLAINT:    Chief Complaint   Patient presents with    Diabetes     Patient is in to follow up on diabetes, A1c check up      Hypothyroidism        HISTORY OF PRESENT ILLNESS:   Paula Hugo is a 44 year old female who presents with thyroid cancer, PTC stage I, hypothyroid, DM, obese, DLP, statin intolerance and low vit D    Justin Marks MD ENT. Reviewed her CT.   We discussed with Dr Can. Tg levels are high so will proceed with US guided FNA to thyroid bed lesions to get more result. She underwent US-guided FNA and sample was insufficient   She is here to discuss next step. She is ok getting another FNA if needed US or CT guided.   We did PA for her  Synthroid 125 mcg /day  She resumed it and takes it  as rec'  She did have symptoms from generic and other brands       She has compressive symptoms        She is on MTF, farxiga and  ozempic    She is on vit  D3, B12 and iron         We discussed labs and imaging studies        Thyroid cancer history   9/11/2023 Right mid thyroid nodule, FNA - PTC ([facility])  10/26/2023 Bilateral Total Thyroidectomy  by Justin Can MD      12/2023 US   . There is soft tissue mass in area of right thyroidectomy which has suspicious imaging appearance on ultrasound and should be further evaluated with CT or I 131 imaging study.   . Findings in left neck in thyroid region are probably granulation tissue but nonspecific.   . Mildly enlarged left submandibular and node is noted.  This does maintain normal lymph node architecture although should be correlated clinically.     12/8/2023 TSH >100, Tg 143, neg TgAb  12/26/023   on 50 mcg Synthroid  1/11/2024 TSH 3.1  1/16/2024 TSH 1.39 125 mcg Synthroid    12/2023 US post op month #2 when her TSH was > 100 which showed possible Rt thyroid bed residual.   2/2024 TSH 0.059  FT41.7   Tg 8.9 TgAb <1.0   3/28/2024 US right thyroid bed is a hypoechoic, 0.9 x 0.9 x 1.6 versus 1 x 1.1 x 1.7 cm nodule.  Within the left thyroid bed best seen on trans imaging is a stable 4 x 5 versus 4 x 6 mm echogenic area.   4/2/2024 TSH 0.8 , Tg 13.9, TgAb <1   6/10/2024  CURRY treatment 106.9 mCi I-131   6/17/2024 Post tx scan There is radiotracer accumulation within the thyroid bed bilaterally which could reflect recurrent or residual neoplasm and/or residual thyroid tissue.  No definitive distant metastatic disease.   7/30/2024 TSH 0.5, FT4 1.4, Tg 18, TgAb<1  8/8/2024 rt thyroid bed residual is smaller now   8/2024 head/neck US showed  1.3 x 0.9 x 0.8 cm hypoechoic structure within the right thyroidectomy bed, stable to minimally decreased in size from prior.   Within the right upper lateral neck there is a 1.9 x 0.9 x 1.4 cm lymph node which demonstrates a fatty hilum and thin cortex. Within the right submandibular region there is a 1.6 x 0.6 x 1.3 cm lymph node with fatty hilum and thin cortex.  Within the   left submandibular region there is a 1.2 x 0.5 x 1.0 cm lymph node with fatty hilum and thin cortex.   The previously identified lesion in the left thyroidectomy bed is not identified on the current exam.  Attention on follow-up is recommended.     12/2024 US 1.3 x 0.9 x 0.8 cm hypoechoic structure within the right thyroidectomy bed.  0.7 x 0.5 cm unchanged compared to a few of the previous studies.   Within the left submandibular region,   7/2024 TSH 0.5 Tg 18.8 w/ neg TgAb  9/2024 TSH 0.5 Tg 12.0 w/ neg TgAb  12/5/2024 TSH 0.4 , Tg 5 on 125 mcg Synthroid      2/6/2025 TSH 0.6 , Tg 21 on 125 mcg Synthroid   5/8/2025 Tg 10, TgAb <1, TSH 0.116, FT4 1.5, FT3 3.45    4/2025 US No abnormality demonstrated in targeted neck ultrasound.    5/8/2025 CT 17 x 12 mm nodule along the midline inferior margin of the expected thyroidectomy bed , and a smaller nodule on the right.  No enlarged lymph nodes or nodules outside  of these regions.       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  -Tumor is confined to the thyroid parenchyma.  -Margins of resection are free of tumor.  -Single right perithyroidal lymph node, negative for metastatic carcinoma.           SPECIMEN   Procedure  Total thyroidectomy   TUMOR   Tumor Focality  Unifocal   Tumor Characteristics     Tumor Site  Right lobe   Tumor Size  Greatest Dimension (Centimeters): 2.5 cm   Histologic Tumor Types and Subtypes  Papillary thyroid carcinoma   Tumor Proliferative Activity     Mitotic Rate  Less than 3 mitoses per 2mm2   Tumor Necrosis  Not identified   Angioinvasion (vascular invasion)  Not identified   Lymphatic Invasion  Not identified   Perineural Invasion  Not identified   Extrathyroidal Extension  Not identified   Margin Status  All margins negative for carcinoma   Distance from Invasive Carcinoma to Closest Margin  Less than 1 mm   REGIONAL LYMPH NODES   Regional Lymph Node Status  All regional lymph nodes negative for tumor   Number of Lymph Nodes Examined  1   Matt Level(s) Examined  Level VI   pTNM CLASSIFICATION (AJCC 8th Edition)   Reporting of pT, pN, and (when applicable) pM categories is based on information available to the pathologist at the time the report is issued. As per the AJCC (Chapter 1, 8th Ed.) it is the managing physician’s responsibility to establish the final pathologic stage based upon all pertinent information, including but potentially not limited to this pathology report.        pT Category  pT2   pN Category  pN0a   ADDITIONAL FINDINGS   Additional Findings  None identified     DM   On metformin, farxiga, ozempic 1 mg/week     Lab Results   Component Value Date    A1C 6.2 (A) 07/26/2025    A1C 6.7 (H) 05/08/2025    A1C 6.0 (H) 03/07/2025    A1C 6.3 (A) 12/05/2024    A1C 6.0 (H) 07/30/2024      Micro Albumen/Creatinine:    Lab Results    Component Value Date    MICROALBCREA  05/08/2025      Comment:      Unable to calculate due to Urine Microalbumin <0.3 mg/dL        MICROALBCREA 9.8 07/30/2024           DLP On zetia      She did not tolerate statin- forgetfulness. Does not want to try them again.               ASSESSMENT AND PLAN:  44 year old woman with PTC stage I T2 N0 M0, s/p surgery 10/2023 and  CURRY 106 6/2024 and post tx scan no distant mets.  post op hypothyroid, DM, obese, DLP, statin intolerance, low b12 and low vit D    10/2023 pathology showed 2.5 cm PTC Rt side, no ETE/vascular/lymphatic/perineural invasion, negative margins, 0/1 LN VI,      Her initial recurrence risk per PRAKASH was ~ 5% based on pathology alone. Her post op w/u showed high Tg levels ( stimulated 143 and non-stimulated 8.9)    3/2024 US thyroid also showed some possible BL thyroid bed residual.      6/10/2024  CURRY treatment 106.9 mCi I-131   6/17/2024 Post tx scan There is radiotracer accumulation within the thyroid bed bilaterally which could reflect recurrent or residual neoplasm and/or residual thyroid tissue.  No definitive distant metastatic disease.        12/2024 US 1.3 x 0.9 x 0.8 cm hypoechoic structure within the right thyroidectomy bed.  0.7 x 0.5 cm unchanged compared to a few of the previous studies.          5/8/2025 Tg 10, TgAb <1, TSH 0.116, FT4 1.5, FT3 3.45    4/2025 US No abnormality demonstrated in targeted neck ultrasound.    5/8/2025 CT 17 x 12 mm nodule along the midline inferior margin of the expected thyroidectomy bed , and a smaller nodule on the right.  No enlarged lymph nodes or nodules outside of these regions.           Clinically, nonspecific symptoms. We discussed again. Her Tg levels are high. US and CT showed possible thyroid bed residual. Will refer to get FNA (US vs CT) and follow up with surgery           Plan      Labs today   Will  reach out to radiology to do FNA again to thyroid bed lesions on CT scan vs US  Follow up with ENT      In 3 mo  labs and follow up      US in 10/2025     Ozempic 1 mg weekly  Metformin   Farxiga  Zetia  B12  Vit d  Atorvastatin    Low iron. on iron. Take  stool softener  if you have constipation      Thyroid medication dose: Synthroid 125 mcg /day. Might increase to 137 after labs today   Take you medication on empty stomach, not with any other medication or food. Wait 60 minutes before eating. Wait 4 hours before taking Vitamins, Calcium or iron. On the morning of the lab test, please take the medication after the blood test not before. Do not take Biotin 1 week before blood test.      Did not tolerate atorvastatin and does not want to try them again       PAST MEDICAL HISTORY:   Past Medical History:    Diabetes (HCC)    Disorder of thyroid    High blood pressure    High cholesterol    History of COVID-19    Had flu like S/S x 2-3 days.    Hypothyroidism    Neuropathy    feet    PONV (postoperative nausea and vomiting)    Thyroid cancer (HCC)    Visual impairment    glasses       PAST SURGICAL HISTORY:   Past Surgical History:   Procedure Laterality Date      2002      06/15/2005      2007      10/17/2013    Thyroidectomy     10/2023 thyroidectomy      CURRENT MEDICATIONS:    Current Outpatient Medications   Medication Sig Dispense Refill    metFORMIN  MG Oral Tablet 24 Hr Take 1 tablet (500 mg total) by mouth 2 (two) times daily with meals. Can hold until diet improves 180 tablet 1    nystatin 100,000 Units/g External Cream Apply 1 g topically to abdomen 2 (two) times daily. 30 g 0    Continuous Glucose Transmitter (DEXCOM G6 TRANSMITTER) Does not apply Misc Take 1 Bottle by mouth As Directed.      Continuous Glucose Sensor (DEXCOM G7 SENSOR) Does not apply Misc 1 each Every 10 days. Use as directed every 10 days 3 each 11    Continuous Glucose Sensor (DEXCOM G6 SENSOR) Does not apply Misc 1 each Every 10 days. Use as directed every 10 days 9 each 1     SYNTHROID 125 MCG Oral Tab Take 1 tablet (125 mcg total) by mouth before breakfast. Synthroid brand name only 90 tablet 1    OZEMPIC, 1 MG/DOSE, 4 MG/3ML Subcutaneous Solution Pen-injector INJECT 1MG INTO THE SKIN ONCE A WEEK 9 mL 1    atorvastatin 40 MG Oral Tab Take 1 tablet (40 mg total) by mouth nightly. 30 tablet 0    aspirin 81 MG Oral Tab EC Take 1 tablet (81 mg total) by mouth daily. 30 tablet 0    ergocalciferol 1.25 MG (15095 UT) Oral Cap Take 1 capsule (50,000 Units total) by mouth twice a week.      Glucose Blood (ACCU-CHEK GUIDE TEST) In Vitro Strip Use to check blood glucose twice daily. Dx E11.65. 200 each 1    Accu-Chek Softclix Lancets Does not apply Misc Use to check blood glucose twice daily. Dx E11.65. 200 each 1    FARXIGA 10 MG Oral Tab Take 1 tablet (10 mg total) by mouth daily. Can hold until diet improves (Patient taking differently: Take 5 mg by mouth daily. Can hold until diet improves) 30 tablet 5    levothyroxine 125 MCG Oral Tab Take 1 tablet (125 mcg total) by mouth before breakfast. 90 tablet 0    UNITHROID 125 MCG Oral Tab Take 1 tablet (125 mcg total) by mouth before breakfast. 90 tablet 1     No medication comments found.   metFORMIN  MG Oral Tablet 24 Hr Take 1 tablet (500 mg total) by mouth 2 (two) times daily with meals. Can hold until diet improves 180 tablet 1    nystatin 100,000 Units/g External Cream Apply 1 g topically to abdomen 2 (two) times daily. 30 g 0    Continuous Glucose Transmitter (DEXCOM G6 TRANSMITTER) Does not apply Misc Take 1 Bottle by mouth As Directed.      Continuous Glucose Sensor (DEXCOM G7 SENSOR) Does not apply Misc 1 each Every 10 days. Use as directed every 10 days 3 each 11    Continuous Glucose Sensor (DEXCOM G6 SENSOR) Does not apply Misc 1 each Every 10 days. Use as directed every 10 days 9 each 1    SYNTHROID 125 MCG Oral Tab Take 1 tablet (125 mcg total) by mouth before breakfast. Synthroid brand name only 90 tablet 1    OZEMPIC, 1  MG/DOSE, 4 MG/3ML Subcutaneous Solution Pen-injector INJECT 1MG INTO THE SKIN ONCE A WEEK 9 mL 1    atorvastatin 40 MG Oral Tab Take 1 tablet (40 mg total) by mouth nightly. 30 tablet 0    aspirin 81 MG Oral Tab EC Take 1 tablet (81 mg total) by mouth daily. 30 tablet 0    ergocalciferol 1.25 MG (66507 UT) Oral Cap Take 1 capsule (50,000 Units total) by mouth twice a week.      Glucose Blood (ACCU-CHEK GUIDE TEST) In Vitro Strip Use to check blood glucose twice daily. Dx E11.65. 200 each 1    Accu-Chek Softclix Lancets Does not apply Misc Use to check blood glucose twice daily. Dx E11.65. 200 each 1    FARXIGA 10 MG Oral Tab Take 1 tablet (10 mg total) by mouth daily. Can hold until diet improves (Patient taking differently: Take 5 mg by mouth daily. Can hold until diet improves) 30 tablet 5      nystatin (Mycostatin) 100,000 Units/g cream   Topical BID             ALLERGIES:  Allergies   Allergen Reactions    Penicillins HIVES       SOCIAL HISTORY:    Social History     Socioeconomic History    Marital status:    Tobacco Use    Smoking status: Never    Smokeless tobacco: Never   Vaping Use    Vaping status: Never Used   Substance and Sexual Activity    Alcohol use: Never    Drug use: Never   Other Topics Concern    Caffeine Concern Yes     Comment: 1-2 cups a day    Special Diet Yes     Comment: 2-3 x week       FAMILY HISTORY:   Family History   Problem Relation Age of Onset    Diabetes Mother              PHYSICAL EXAM:   Height: 5' (152.4 cm) (07/26 1100)  Weight: 157 lb (71.2 kg) (07/26 1100)  BSA (Calculated - sq m): 1.68 sq meters (07/26 1100)  Pulse: 82 (07/26 1100)  BP: 104/79 (07/26 1100)  Temp: --  Do Not Use - Resp Rate: --  SpO2: --    Body mass index is 30.66 kg/m².  Wt Readings from Last 6 Encounters:   07/26/25 157 lb (71.2 kg)   05/08/25 157 lb (71.2 kg)   03/07/25 163 lb 9.3 oz (74.2 kg)   02/06/25 162 lb (73.5 kg)   12/05/24 160 lb (72.6 kg)   09/24/24 157 lb (71.2 kg)          Well healed  wound at the base of the neck   No LAP on exam       Neck/Thyroid: neck inspection:  + scar,       DATA:   Pertinent data reviewed   04/10/25 13:53   US HEAD/NECK (CPT=76536) No abnormality demonstrated in targeted neck ultrasound.  Further management of left neck discomfort should be based on clinical grounds    Rpt: View report in Results Review for more information   12/27/24 10:55   US HEAD/NECK (CPT=76536) Rpt   Rpt: View report in Results Review for more information stable 1.3 x 0.9 x 0.8 cm hypoechoic structure within the right thyroidectomy bed.   Within the right upper lateral neck there is a minimally smaller 1.5 x 0.6 x 1.4 cm lymph node previously measured 1.9 x 0.9 x 1.4 cm which demonstrates a fatty hilum and thin cortex.    Left thyroid bed:  There is a small focal area of granulation tissue noted in the left thyroid bed measuring 0.7 x 0.5 cm unchanged compared to a few of the previous studies.    Latest Reference Range & Units 12/05/24 11:39   T4,Free (Direct) 0.8 - 1.7 ng/dL 1.5   TSH 0.550 - 4.780 uIU/mL 0.440 (L)   T3 FREE 2.40 - 4.20 pg/mL 2.98   Thyroglob Ab 0.0 - 0.9 IU/mL <1.0   Thyroglobulin, LIA 1.5 - 38.5 ng/mL 8.2   (L): Data is abnormally low       No results for input(s): \"TSH\", \"T4F\", \"T3F\", \"THYP\" in the last 72 hours.      No results found.      POC HemoCue Glucose 201 (Finger stick glucose)        Component Value Flag Ref Range Units Status    GLUCOSE BLOOD 120        Final    Test Strip Lot # [id number]       Numeric Final    Test Strip Expiration Date 12/14/25       Date Final                  POC Glycohemoglobin [97831]        Component Value Flag Ref Range Units Status    HEMOGLOBIN A1C 6.2      4.3 - 5.6 % Final    Cartridge Lot# [id number]       Numeric Final    Cartridge Expiration Date 3/27/27       Date Final                          Orders Placed This Encounter   Procedures    POC HemoCue Glucose 201 (Finger stick glucose)    POC Glycohemoglobin [41815]    Thyroglobulin  Antibody and Tumor Marker    TSH W Reflex To Free T4    TSH W Reflex To Free T4    Thyroglobulin Antibody and Tumor Marker     Orders Placed This Encounter    POC HemoCue Glucose 201 (Finger stick glucose)     Release to patient:   Immediate    POC Glycohemoglobin [64891]     Release to patient:   Immediate    Thyroglobulin Antibody and Tumor Marker     Standing Status:   Future     Expected Date:   7/26/2025     Expiration Date:   7/26/2026     Release to patient:   Immediate    TSH W Reflex To Free T4     Standing Status:   Future     Expected Date:   7/26/2025     Expiration Date:   7/26/2026     Release to patient:   Immediate    TSH W Reflex To Free T4     Standing Status:   Future     Expected Date:   10/24/2025     Expiration Date:   7/26/2026     Release to patient:   Immediate    Thyroglobulin Antibody and Tumor Marker     Standing Status:   Future     Expected Date:   10/24/2025     Expiration Date:   7/26/2026     Release to patient:   Immediate          This is a specialized patient consultation in endocrinology and required comprehensive review of prior records, as well as current evaluation, with time required for consideration of complex endocrine issues and consultation. For this visit, I personally interviewed the patient, and family member if accompanied, performed the pertinent parts of the history and physical examination. ROS included screening for appropriate endocrine conditions.   Today's diagnosis and plan were reviewed in detail with the patient who states understanding and agrees with plan. I discussed with the patient possible diagnosis, differential diagnosis, need for work up , treatment options, alternatives and side effects.     Please see note for details about time spent which includes:   · pre-visit preparation  · reviewing records  · face to face time with the patient   · timely documentation of the encounter  · ordering medications/tests  · communication with care team  · care  coordination    I appreciate the opportunity to be part of your patient's medical care and will keep you, as the referring and primary physicians, informed about the care of your patient, including possible future surgery and pathology findings. Please feel free to contact me should you have any questions.      Leigh Taylor MD

## 2025-07-26 NOTE — PATIENT INSTRUCTIONS
We got PA for Synthroid brand name. She did not tolerate generic levothyroxine nor Unithroid  Labs today   Will  reach out to radiology to do FNA againto thyroid bed lesions on CT scan   Follow up with ENT     In 3 mo  labs and follow up   Follow up with psych  US in 10/2025        Ozempic 1 mg weekly  Metformin   Farxiga  Zetia  B12  Vit d   atorvastatin Tabs - 40 MG

## 2025-07-29 ENCOUNTER — TELEPHONE (OUTPATIENT)
Dept: ENDOCRINOLOGY CLINIC | Facility: CLINIC | Age: 45
End: 2025-07-29

## 2025-07-29 DIAGNOSIS — C73 THYROID CANCER (HCC): Primary | ICD-10-CM

## 2025-08-18 ENCOUNTER — LAB ENCOUNTER (OUTPATIENT)
Dept: LAB | Age: 45
End: 2025-08-18
Attending: INTERNAL MEDICINE

## 2025-08-18 DIAGNOSIS — E07.9 THYROID DISEASE: ICD-10-CM

## 2025-08-18 LAB
T3FREE SERPL-MCNC: 3.18 PG/ML (ref 2.4–4.2)
T4 FREE SERPL-MCNC: 1.7 NG/DL (ref 0.8–1.7)
TSI SER-ACNC: 0.15 UIU/ML (ref 0.55–4.78)

## 2025-08-18 PROCEDURE — 84481 FREE ASSAY (FT-3): CPT

## 2025-08-18 PROCEDURE — 86800 THYROGLOBULIN ANTIBODY: CPT

## 2025-08-18 PROCEDURE — 84439 ASSAY OF FREE THYROXINE: CPT

## 2025-08-18 PROCEDURE — 36415 COLL VENOUS BLD VENIPUNCTURE: CPT

## 2025-08-18 PROCEDURE — 84443 ASSAY THYROID STIM HORMONE: CPT

## 2025-08-26 ENCOUNTER — HOSPITAL ENCOUNTER (OUTPATIENT)
Dept: ULTRASOUND IMAGING | Facility: HOSPITAL | Age: 45
Discharge: HOME | End: 2025-08-26
Attending: INTERNAL MEDICINE

## 2025-08-26 DIAGNOSIS — E07.9 THYROID DISEASE: ICD-10-CM

## 2025-08-26 LAB
LC THYROGLOBIN LCMS: 9.9 NG/ML
THYROGLOB AB: 1.4 IU/ML

## 2025-08-26 PROCEDURE — 76536 US EXAM OF HEAD AND NECK: CPT | Performed by: INTERNAL MEDICINE

## (undated) DEVICE — STERILE POLYISOPRENE POWDER-FREE SURGICAL GLOVES: Brand: PROTEXIS

## (undated) DEVICE — SUT SLK 3-0 LABYRINTH BK

## (undated) DEVICE — SPONGE GZ W4XL4IN 100% COT 12 PLY TYP VII WVN

## (undated) DEVICE — POWDER HEMSTAT 3GM OXIDIZED REGENERATED CELOS

## (undated) DEVICE — SPONGE: SPECIALTY PEANUT XR 100/CS: Brand: MEDICAL ACTION INDUSTRIES

## (undated) DEVICE — E-Z CLEAN, NON-STICK, PTFE COATED, ELECTROSURGICAL NEEDLE ELECTRODE, MODIFIED EXTENDED INSULATION, 2.75 INCH (7 CM): Brand: MEGADYNE

## (undated) DEVICE — HEAD AND NECK CDS-LF: Brand: MEDLINE INDUSTRIES, INC.

## (undated) DEVICE — SUT SLK 2-0 30IN MULTPK BK

## (undated) DEVICE — SOLUTION IRRIG 1000ML 0.9% NACL USP BTL

## (undated) DEVICE — 3M™ STERI-STRIP™ REINFORCED ADHESIVE SKIN CLOSURES, R1547, 1/2 IN X 4 IN (12 MM X 100 MM), 6 STRIPS/ENVELOPE: Brand: 3M™ STERI-STRIP™

## (undated) DEVICE — PAD SACRAL SPAN AID

## (undated) DEVICE — PROBE 8225101 5PK STD PRASS FL TIP ROHS

## (undated) DEVICE — PREMIUM WET SKIN PREP TRAY: Brand: MEDLINE INDUSTRIES, INC.

## (undated) DEVICE — UNDYED BRAIDED (POLYGLACTIN 910), SYNTHETIC ABSORBABLE SUTURE: Brand: COATED VICRYL

## (undated) DEVICE — SUTURE MCRYL SZ 4-0 L27IN ABSRB UD L19MM PS-2

## (undated) DEVICE — SLEEVE COMPR M KNEE LEN SGL USE KENDALL SCD

## (undated) DEVICE — 3M™ TEGADERM™ TRANSPARENT FILM DRESSING, 1626W, 4 IN X 4-3/4 IN (10 CM X 12 CM), 50 EACH/CARTON, 4 CARTON/CASE: Brand: 3M™ TEGADERM™

## (undated) DEVICE — HOOK RETRCT DIA5MM SHRP E STAY DISP FOR LONE

## (undated) DEVICE — HARMONIC FOCUS SHEARS 9CM LENGTH + ADAPTIVE TISSUE TECHNOLOGY FOR USE WITH BLUE HAND PIECE ONLY: Brand: HARMONIC FOCUS

## (undated) DEVICE — SUTURE VCRL SZ 3-0 L27IN ABSRB UD L26MM SH

## (undated) NOTE — LETTER
5/21/2025              Paula Hugo        [address] [place] [id number]                                                                                   Case ID: [id number]                                                                                YOB: 1980         To Whom it May Concern:    I am writing to request coverage for brand name Synthroid as a medically necessary therapy for my patient, Paula Hugo. She is being treated for postoperative hypothyroidism following a total thyroidectomy due to thyroid cancer and will require lifelong thyroid hormone replacement therapy.    This patient was previously treated with generic levothyroxine, but her response was suboptimal. After discontinuation due to lack of clinical effectiveness, she was transitioned to brand name Synthroid, which resulted in stable thyroid hormone levels and improved overall clinical management.    Given her oncologic history and the need for consistent and reliable thyroid hormone levels, it is critical to avoid variability in bioavailability that can occur between different generic formulations. Synthroid has provided consistent therapeutic outcomes, and switching to a generic poses unnecessary risk to her long-term stability.    It is my professional judgment that brand name Synthroid is medically necessary and should not be substituted. Continuity with this specific formulation is essential for maintaining effective disease management and preventing potential complications.    Thank you for your attention to this request. Please feel free to contact me if further clinical documentation is required.    Sincerely,    Leigh Taylor MD          Document electronically generated by:  Adriana ORTIZ RN

## (undated) NOTE — LETTER
12/13/19    Dear Dr. Sarai Tidwell saw your patient, Tara Zavala for an initial visit. Please see my H&P note enclosed below. Let me know if you have any questions.     Thank you  Roberto Acosta MD, Neurology  [facility]  P where she had sudden unexplained constipation for 4-5 days, with resolution on own. She also admits to times in the past where she has woken up with \"swollen sensation\" in her face or in her R hand more than L hand.      Currently, she continues to feel adult)   Pulse 82   Resp 16   Ht 60\"   Wt 158 lb (71.7 kg)   LMP 12/01/2019   BMI 30.86 kg/m²   Estimated body mass index is 30.86 kg/m² as calculated from the following:    Height as of this encounter: 60\".     Weight as of this encounter: 158 lb (71.7 k Heel to shin is normal bilaterally    DTRs:   2+, symmetric, throughout, toes downgoing bilaterally; no clonus          Gait:  Normal casual, heel, toe and tandem gait    Other signs: Lhermitte's sign positive    TEST RESULTS/DATA REVIEWED:     Summarized In light of patient's symptoms, I discussed with patient that I am suspicious for underlying demyelinating disease, with a first attack of demyelinating disease, but we will await work-up for other causes.   I informed the patient of the proposed work-up (CPT=62270,80017), IRVIN, DIRECT, REFLEX TO 9         ENAS, ANCA PANEL VASCULITIS W/REFLEX,         RHEUMATOID ARTHRITIS FACTOR, VITAMIN B12, CELL         COUNT, CSF, CYTOLOGY FLUIDS, GLUCOSE,         CEREBROSPINAL FLUID, IGG/ALBUMIN RATIO,CSF, (R32) Urinary incontinence, unspecified type  Plan: MRI BRAIN (W+WO) (CPT=70553), MRI         CERVICAL+THORACIC SPINE (ALL W+WO)         (CPT=72945/01324), XR LUMBAR PUNCTURE          (CPT=51161,57263), IRVIN, DIRECT, REFLEX TO 9         ENAS, ANCA PANEL VAS